# Patient Record
Sex: MALE | Race: WHITE | NOT HISPANIC OR LATINO
[De-identification: names, ages, dates, MRNs, and addresses within clinical notes are randomized per-mention and may not be internally consistent; named-entity substitution may affect disease eponyms.]

---

## 2017-07-27 ENCOUNTER — APPOINTMENT (OUTPATIENT)
Dept: FAMILY MEDICINE | Facility: CLINIC | Age: 72
End: 2017-07-27
Payer: MEDICARE

## 2017-07-27 VITALS
DIASTOLIC BLOOD PRESSURE: 78 MMHG | HEART RATE: 72 BPM | WEIGHT: 176 LBS | BODY MASS INDEX: 24.64 KG/M2 | SYSTOLIC BLOOD PRESSURE: 140 MMHG | TEMPERATURE: 98.7 F | OXYGEN SATURATION: 97 % | RESPIRATION RATE: 12 BRPM | HEIGHT: 71 IN

## 2017-07-27 DIAGNOSIS — L50.8 OTHER URTICARIA: ICD-10-CM

## 2017-07-27 PROCEDURE — 99214 OFFICE O/P EST MOD 30 MIN: CPT

## 2017-10-04 ENCOUNTER — APPOINTMENT (OUTPATIENT)
Dept: FAMILY MEDICINE | Facility: CLINIC | Age: 72
End: 2017-10-04
Payer: MEDICARE

## 2017-10-04 VITALS
OXYGEN SATURATION: 98 % | BODY MASS INDEX: 25.06 KG/M2 | DIASTOLIC BLOOD PRESSURE: 74 MMHG | SYSTOLIC BLOOD PRESSURE: 132 MMHG | HEIGHT: 71 IN | HEART RATE: 64 BPM | TEMPERATURE: 98.9 F | RESPIRATION RATE: 12 BRPM | WEIGHT: 179 LBS

## 2017-10-04 DIAGNOSIS — Z95.828 PRESENCE OF OTHER VASCULAR IMPLANTS AND GRAFTS: ICD-10-CM

## 2017-10-04 PROCEDURE — 99213 OFFICE O/P EST LOW 20 MIN: CPT | Mod: 25

## 2017-10-04 PROCEDURE — 90688 IIV4 VACCINE SPLT 0.5 ML IM: CPT

## 2017-10-04 PROCEDURE — G0008: CPT | Mod: 59

## 2018-09-13 ENCOUNTER — APPOINTMENT (OUTPATIENT)
Dept: FAMILY MEDICINE | Facility: CLINIC | Age: 73
End: 2018-09-13
Payer: MEDICARE

## 2018-09-13 VITALS
BODY MASS INDEX: 23.52 KG/M2 | SYSTOLIC BLOOD PRESSURE: 120 MMHG | DIASTOLIC BLOOD PRESSURE: 68 MMHG | OXYGEN SATURATION: 98 % | HEIGHT: 71 IN | WEIGHT: 168 LBS | TEMPERATURE: 98.7 F | RESPIRATION RATE: 12 BRPM | HEART RATE: 68 BPM

## 2018-09-13 DIAGNOSIS — Z23 ENCOUNTER FOR IMMUNIZATION: ICD-10-CM

## 2018-09-13 PROCEDURE — G0008: CPT

## 2018-09-13 PROCEDURE — 90662 IIV NO PRSV INCREASED AG IM: CPT

## 2019-08-27 ENCOUNTER — INBOUND DOCUMENT (OUTPATIENT)
Age: 74
End: 2019-08-27

## 2019-10-16 ENCOUNTER — INBOUND DOCUMENT (OUTPATIENT)
Age: 74
End: 2019-10-16

## 2019-12-16 ENCOUNTER — INBOUND DOCUMENT (OUTPATIENT)
Age: 74
End: 2019-12-16

## 2020-09-29 ENCOUNTER — APPOINTMENT (OUTPATIENT)
Dept: FAMILY MEDICINE | Facility: CLINIC | Age: 75
End: 2020-09-29
Payer: MEDICARE

## 2020-09-29 VITALS
BODY MASS INDEX: 23.8 KG/M2 | TEMPERATURE: 97.7 F | DIASTOLIC BLOOD PRESSURE: 70 MMHG | HEART RATE: 70 BPM | WEIGHT: 170 LBS | RESPIRATION RATE: 14 BRPM | HEIGHT: 71 IN | SYSTOLIC BLOOD PRESSURE: 120 MMHG | OXYGEN SATURATION: 96 %

## 2020-09-29 DIAGNOSIS — Z23 ENCOUNTER FOR IMMUNIZATION: ICD-10-CM

## 2020-09-29 PROCEDURE — 90686 IIV4 VACC NO PRSV 0.5 ML IM: CPT

## 2020-09-29 PROCEDURE — G0008: CPT

## 2020-10-06 PROBLEM — Z23 NEEDS FLU SHOT: Status: ACTIVE | Noted: 2020-10-06

## 2020-10-06 NOTE — ASSESSMENT
[FreeTextEntry1] : Pt present for Flu Vax only. \par High Dose Flu was given L deltiod \par pt handle Vax well. \par Understood Vax information given.

## 2021-12-14 ENCOUNTER — APPOINTMENT (OUTPATIENT)
Dept: FAMILY MEDICINE | Facility: CLINIC | Age: 76
End: 2021-12-14
Payer: MEDICARE

## 2021-12-14 VITALS
TEMPERATURE: 98.2 F | HEART RATE: 87 BPM | HEIGHT: 71 IN | BODY MASS INDEX: 24.08 KG/M2 | RESPIRATION RATE: 12 BRPM | DIASTOLIC BLOOD PRESSURE: 80 MMHG | WEIGHT: 172 LBS | SYSTOLIC BLOOD PRESSURE: 120 MMHG | OXYGEN SATURATION: 98 %

## 2021-12-14 PROCEDURE — G0008: CPT

## 2021-12-14 PROCEDURE — 0011A: CPT

## 2021-12-14 PROCEDURE — 90662 IIV NO PRSV INCREASED AG IM: CPT

## 2021-12-14 NOTE — HISTORY OF PRESENT ILLNESS
[FreeTextEntry1] : PT  HERE FOR FLU VACCINE AND REQ. COVID MODERNA 1ST DOSE. PT HAS NOT SEEN PMD FOR ABOUT 14MONTHS AS PER MICKY PAL TO ADM. VACCINES AND APPT FOR A CPE WILL BE GIVEN TODAY.  [de-identified] : PT REPORTS FEELING STABLE

## 2021-12-14 NOTE — PLAN
[FreeTextEntry1] : HIGH FLU AND 1ST DOSE MODERNA VACCINE ADMINISTERD ON BOTH ARMS, WELL TOLERATED, NO S/S ACUTE RXS. EDUCATIONAL PAMPHLET PROVIDED. PT VERB. UNDERSTANDING. MARGARITA ROLON

## 2022-01-12 ENCOUNTER — APPOINTMENT (OUTPATIENT)
Dept: FAMILY MEDICINE | Facility: CLINIC | Age: 77
End: 2022-01-12
Payer: MEDICARE

## 2022-01-12 VITALS
WEIGHT: 172 LBS | TEMPERATURE: 97.9 F | RESPIRATION RATE: 16 BRPM | SYSTOLIC BLOOD PRESSURE: 132 MMHG | HEIGHT: 71 IN | BODY MASS INDEX: 24.08 KG/M2 | DIASTOLIC BLOOD PRESSURE: 80 MMHG | HEART RATE: 88 BPM | OXYGEN SATURATION: 98 %

## 2022-01-12 PROCEDURE — 0012A: CPT

## 2022-01-12 NOTE — ADDENDUM
[FreeTextEntry1] : Booster dose adm. on left deltoid, well tolerated, pt inst. on possible s/e, no acute rxs at present. Educational pamphlet provided. Pt to wait 15 min. post vacc. administration. Pt alexandre. understanding. Yasmin ROLON

## 2022-01-26 ENCOUNTER — APPOINTMENT (OUTPATIENT)
Dept: FAMILY MEDICINE | Facility: CLINIC | Age: 77
End: 2022-01-26

## 2022-04-24 ENCOUNTER — TRANSCRIPTION ENCOUNTER (OUTPATIENT)
Age: 77
End: 2022-04-24

## 2022-04-25 ENCOUNTER — TRANSCRIPTION ENCOUNTER (OUTPATIENT)
Age: 77
End: 2022-04-25

## 2022-04-25 ENCOUNTER — LABORATORY RESULT (OUTPATIENT)
Age: 77
End: 2022-04-25

## 2022-04-25 ENCOUNTER — APPOINTMENT (OUTPATIENT)
Dept: FAMILY MEDICINE | Facility: CLINIC | Age: 77
End: 2022-04-25
Payer: MEDICARE

## 2022-04-25 VITALS
OXYGEN SATURATION: 94 % | HEIGHT: 71 IN | WEIGHT: 155 LBS | TEMPERATURE: 98 F | SYSTOLIC BLOOD PRESSURE: 118 MMHG | BODY MASS INDEX: 21.7 KG/M2 | HEART RATE: 66 BPM | RESPIRATION RATE: 16 BRPM | DIASTOLIC BLOOD PRESSURE: 70 MMHG

## 2022-04-25 DIAGNOSIS — M31.19 OTHER THROMBOTIC MICROANGIOPATHY: ICD-10-CM

## 2022-04-25 DIAGNOSIS — E78.5 HYPERLIPIDEMIA, UNSPECIFIED: ICD-10-CM

## 2022-04-25 DIAGNOSIS — Z12.11 ENCOUNTER FOR SCREENING FOR MALIGNANT NEOPLASM OF COLON: ICD-10-CM

## 2022-04-25 DIAGNOSIS — Z78.9 OTHER SPECIFIED HEALTH STATUS: ICD-10-CM

## 2022-04-25 DIAGNOSIS — Z11.52 ENCOUNTER FOR SCREENING FOR COVID-19: ICD-10-CM

## 2022-04-25 DIAGNOSIS — Z72.3 LACK OF PHYSICAL EXERCISE: ICD-10-CM

## 2022-04-25 DIAGNOSIS — I10 ESSENTIAL (PRIMARY) HYPERTENSION: ICD-10-CM

## 2022-04-25 DIAGNOSIS — I82.90 ACUTE EMBOLISM AND THROMBOSIS OF UNSPECIFIED VEIN: ICD-10-CM

## 2022-04-25 DIAGNOSIS — R79.89 OTHER SPECIFIED ABNORMAL FINDINGS OF BLOOD CHEMISTRY: ICD-10-CM

## 2022-04-25 DIAGNOSIS — Z12.5 ENCOUNTER FOR SCREENING FOR MALIGNANT NEOPLASM OF PROSTATE: ICD-10-CM

## 2022-04-25 DIAGNOSIS — R53.83 OTHER FATIGUE: ICD-10-CM

## 2022-04-25 DIAGNOSIS — J06.9 ACUTE UPPER RESPIRATORY INFECTION, UNSPECIFIED: ICD-10-CM

## 2022-04-25 LAB — SARS-COV-2 RDRP RESP QL NAA+PROBE: NEGATIVE

## 2022-04-25 PROCEDURE — 87635 SARS-COV-2 COVID-19 AMP PRB: CPT

## 2022-04-25 PROCEDURE — 36415 COLL VENOUS BLD VENIPUNCTURE: CPT

## 2022-04-25 PROCEDURE — 99214 OFFICE O/P EST MOD 30 MIN: CPT | Mod: CS,25

## 2022-04-25 PROCEDURE — 87804 INFLUENZA ASSAY W/OPTIC: CPT | Mod: QW

## 2022-04-25 RX ORDER — AZITHROMYCIN 250 MG/1
250 TABLET, FILM COATED ORAL
Qty: 1 | Refills: 2 | Status: ACTIVE | COMMUNITY
Start: 2022-04-25 | End: 1900-01-01

## 2022-04-25 RX ORDER — METHYLPREDNISOLONE 4 MG/1
4 TABLET ORAL
Qty: 1 | Refills: 0 | Status: COMPLETED | COMMUNITY
Start: 2017-07-27 | End: 2022-04-25

## 2022-04-25 RX ORDER — CEFPROZIL 500 MG/1
500 TABLET ORAL
Qty: 20 | Refills: 1 | Status: ACTIVE | COMMUNITY
Start: 2022-04-25 | End: 1900-01-01

## 2022-04-25 NOTE — HISTORY OF PRESENT ILLNESS
[Family Member] : family member [FreeTextEntry1] : C/o body aches, increased fatigue, SOB for the past three weeks. [de-identified] : C/o body aches, increased fatigue, SOB for the past three weeks.

## 2022-04-25 NOTE — HEALTH RISK ASSESSMENT
[Never] : Never [Yes] : Yes [Monthly or less (1 pt)] : Monthly or less (1 point) [1 or 2 (0 pts)] : 1 or 2 (0 points) [Never (0 pts)] : Never (0 points) [No] : In the past 12 months have you used drugs other than those required for medical reasons? No [No falls in past year] : Patient reported no falls in the past year [0] : 2) Feeling down, depressed, or hopeless: Not at all (0) [PHQ-2 Negative - No further assessment needed] : PHQ-2 Negative - No further assessment needed [Patient/Caregiver not ready to engage] : , patient/caregiver not ready to engage [I will adhere to the patient's wishes.] : I will adhere to the patient's wishes. [Time Spent: ___ minutes] : Time Spent: [unfilled] minutes [0-4] : 0-4 [Audit-CScore] : 0 [de-identified] : Occasional  [de-identified] : Average [[facility]] : 8 [JYS2Nhyel] : 0 [AdvancecareDate] : 04/22

## 2022-04-25 NOTE — PHYSICAL EXAM
[Well Nourished] : well nourished [Well Developed] : well developed [Normal Sclera/Conjunctiva] : normal sclera/conjunctiva [PERRL] : pupils equal round and reactive to light [EOMI] : extraocular movements intact [Normal Outer Ear/Nose] : the outer ears and nose were normal in appearance [Normal Oropharynx] : the oropharynx was normal [No JVD] : no jugular venous distention [No Lymphadenopathy] : no lymphadenopathy [Supple] : supple [Thyroid Normal, No Nodules] : the thyroid was normal and there were no nodules present [No Respiratory Distress] : no respiratory distress  [No Accessory Muscle Use] : no accessory muscle use [Normal Rate] : normal rate  [Regular Rhythm] : with a regular rhythm [Normal S1, S2] : normal S1 and S2 [No Murmur] : no murmur heard [No Carotid Bruits] : no carotid bruits [No Abdominal Bruit] : a ~M bruit was not heard ~T in the abdomen [No Varicosities] : no varicosities [Pedal Pulses Present] : the pedal pulses are present [No Edema] : there was no peripheral edema [No Palpable Aorta] : no palpable aorta [No Extremity Clubbing/Cyanosis] : no extremity clubbing/cyanosis [Soft] : abdomen soft [Non Tender] : non-tender [Non-distended] : non-distended [No Masses] : no abdominal mass palpated [No HSM] : no HSM [Normal Bowel Sounds] : normal bowel sounds [Normal Posterior Cervical Nodes] : no posterior cervical lymphadenopathy [Normal Anterior Cervical Nodes] : no anterior cervical lymphadenopathy [No CVA Tenderness] : no CVA  tenderness [No Spinal Tenderness] : no spinal tenderness [No Joint Swelling] : no joint swelling [Grossly Normal Strength/Tone] : grossly normal strength/tone [No Rash] : no rash [Coordination Grossly Intact] : coordination grossly intact [No Focal Deficits] : no focal deficits [Normal Gait] : normal gait [Deep Tendon Reflexes (DTR)] : deep tendon reflexes were 2+ and symmetric [Normal Affect] : the affect was normal [Normal Insight/Judgement] : insight and judgment were intact

## 2022-04-25 NOTE — COUNSELING
[Fall prevention counseling provided] : Fall prevention counseling provided [Adequate lighting] : Adequate lighting [No throw rugs] : No throw rugs [Use proper foot wear] : Use proper foot wear [Behavioral health counseling provided] : Behavioral health counseling provided [Sleep ___ hours/day] : Sleep [unfilled] hours/day [Engage in a relaxing activity] : Engage in a relaxing activity [AUDIT-C Screening administered and reviewed] : AUDIT-C Screening administered and reviewed [Potential consequences of obesity discussed] : Potential consequences of obesity discussed [Benefits of weight loss discussed] : Benefits of weight loss discussed [Encouraged to increase physical activity] : Encouraged to increase physical activity [Weigh Self Weekly] : weigh self weekly [Decrease Portions] : decrease portions [None] : None [Good understanding] : Patient has a good understanding of lifestyle changes and steps needed to achieve self management goal [No] : Risk of tobacco use and health benefits of smoking cessation discussed: No [FreeTextEntry2] : Never smoker

## 2022-04-25 NOTE — REVIEW OF SYSTEMS
[Patient Intake Form Reviewed] : Patient intake form was reviewed [Negative] : Heme/Lymph [Chills] : chills [Fatigue] : fatigue [Recent Change In Weight] : ~T recent weight change [Nasal Discharge] : nasal discharge [Shortness Of Breath] : shortness of breath [Cough] : cough [Dyspnea on Exertion] : dyspnea on exertion [Abdominal Pain] : abdominal pain [Nausea] : nausea [Nocturia] : nocturia [Frequency] : frequency [Muscle Pain] : muscle pain [Unsteady Walk] : ataxia [Fever] : no fever [Hot Flashes] : no hot flashes [Night Sweats] : no night sweats [Sore Throat] : no sore throat [Chest Pain] : no chest pain [Palpitations] : no palpitations [Orthopena] : no orthopnea [Paroxysmal Nocturnal Dyspnea] : no paroxysmal nocturnal dyspnea [Wheezing] : no wheezing [Constipation] : no constipation [Diarrhea] : no diarrhea [Vomiting] : no vomiting [Heartburn] : no heartburn [Melena] : no melena [Dysuria] : no dysuria [Hematuria] : no hematuria [Joint Pain] : no joint pain [Joint Stiffness] : no joint stiffness [Joint Swelling] : no joint swelling [Headache] : no headache [Fainting] : no fainting [FreeTextEntry4] : Cough

## 2022-04-25 NOTE — ASSESSMENT
[FreeTextEntry1] : Pneumonia - Chest X-ray ordered, Azithromycin 250 mg PO, Cefprozil 500 mg PO BID x10 days. Neg. Rapid Flue and COVID.\par Fatigue - Labs for Fatigue and Dysmetabolism.\par Low Vit. D - Vit. d Level.\par COVID 19 Screening - COVID 19 Abs.\par PSA Screening - PSA.\par

## 2022-04-26 LAB
25(OH)D3 SERPL-MCNC: 9.9 NG/ML
ALBUMIN SERPL ELPH-MCNC: 3.8 G/DL
ALP BLD-CCNC: 89 U/L
ALT SERPL-CCNC: 89 U/L
ANION GAP SERPL CALC-SCNC: 17 MMOL/L
APPEARANCE: CLEAR
AST SERPL-CCNC: 59 U/L
BASOPHILS # BLD AUTO: 0.04 K/UL
BASOPHILS NFR BLD AUTO: 0.3 %
BILIRUB SERPL-MCNC: 0.5 MG/DL
BILIRUBIN URINE: NEGATIVE
BLOOD URINE: ABNORMAL
BUN SERPL-MCNC: 18 MG/DL
C PEPTIDE SERPL-MCNC: 4.4 NG/ML
CALCIUM SERPL-MCNC: 8.7 MG/DL
CHLORIDE SERPL-SCNC: 101 MMOL/L
CHOLEST SERPL-MCNC: 117 MG/DL
CO2 SERPL-SCNC: 24 MMOL/L
COLOR: YELLOW
COVID-19 NUCLEOCAPSID  GAM ANTIBODY INTERPRETATION: NEGATIVE
COVID-19 SPIKE DOMAIN ANTIBODY INTERPRETATION: POSITIVE
CREAT SERPL-MCNC: 0.85 MG/DL
CREAT SPEC-SCNC: 96 MG/DL
EGFR: 89 ML/MIN/1.73M2
EOSINOPHIL # BLD AUTO: 0 K/UL
EOSINOPHIL NFR BLD AUTO: 0 %
ESTIMATED AVERAGE GLUCOSE: 117 MG/DL
FERRITIN SERPL-MCNC: 664 NG/ML
FOLATE SERPL-MCNC: 13.3 NG/ML
GLUCOSE QUALITATIVE U: NEGATIVE
GLUCOSE SERPL-MCNC: 111 MG/DL
HBA1C MFR BLD HPLC: 5.7 %
HCT VFR BLD CALC: 51.4 %
HDLC SERPL-MCNC: 28 MG/DL
HGB BLD-MCNC: 17.1 G/DL
IMM GRANULOCYTES NFR BLD AUTO: 0.6 %
IRON SATN MFR SERPL: 9 %
IRON SERPL-MCNC: 17 UG/DL
KETONES URINE: NORMAL
LDLC SERPL CALC-MCNC: 70 MG/DL
LEUKOCYTE ESTERASE URINE: NEGATIVE
LYMPHOCYTES # BLD AUTO: 0.63 K/UL
LYMPHOCYTES NFR BLD AUTO: 4.4 %
MAN DIFF?: NORMAL
MCHC RBC-ENTMCNC: 33.3 GM/DL
MCHC RBC-ENTMCNC: 33.4 PG
MCV RBC AUTO: 100.4 FL
MICROALBUMIN 24H UR DL<=1MG/L-MCNC: 27.9 MG/DL
MICROALBUMIN/CREAT 24H UR-RTO: 290 MG/G
MONOCYTES # BLD AUTO: 0.58 K/UL
MONOCYTES NFR BLD AUTO: 4.1 %
NEUTROPHILS # BLD AUTO: 12.98 K/UL
NEUTROPHILS NFR BLD AUTO: 90.6 %
NITRITE URINE: NEGATIVE
NONHDLC SERPL-MCNC: 90 MG/DL
PH URINE: 6
PLATELET # BLD AUTO: 168 K/UL
POTASSIUM SERPL-SCNC: 2.9 MMOL/L
PROT SERPL-MCNC: 6.4 G/DL
PROTEIN URINE: ABNORMAL
PSA SERPL-MCNC: 3.06 NG/ML
RBC # BLD: 5.12 M/UL
RBC # FLD: 14.7 %
SARS-COV-2 AB SERPL IA-ACNC: >250 U/ML
SARS-COV-2 AB SERPL QL IA: 0.06 INDEX
SODIUM SERPL-SCNC: 141 MMOL/L
SPECIFIC GRAVITY URINE: 1.02
T4 SERPL-MCNC: 10 UG/DL
TIBC SERPL-MCNC: 184 UG/DL
TRIGL SERPL-MCNC: 100 MG/DL
TSH SERPL-ACNC: 0.84 UIU/ML
UIBC SERPL-MCNC: 167 UG/DL
UROBILINOGEN URINE: NORMAL
VIT B12 SERPL-MCNC: 514 PG/ML
WBC # FLD AUTO: 14.32 K/UL

## 2022-04-26 RX ORDER — ERGOCALCIFEROL 1.25 MG/1
1.25 MG CAPSULE, LIQUID FILLED ORAL
Qty: 12 | Refills: 1 | Status: ACTIVE | COMMUNITY
Start: 2022-04-26 | End: 1900-01-01

## 2022-04-27 ENCOUNTER — NON-APPOINTMENT (OUTPATIENT)
Age: 77
End: 2022-04-27

## 2022-04-27 ENCOUNTER — INPATIENT (INPATIENT)
Facility: HOSPITAL | Age: 77
LOS: 20 days | Discharge: HOME | DRG: 177 | End: 2022-05-18
Attending: INTERNAL MEDICINE | Admitting: INTERNAL MEDICINE
Payer: MEDICARE

## 2022-04-27 VITALS
WEIGHT: 154.98 LBS | OXYGEN SATURATION: 87 % | HEIGHT: 70 IN | RESPIRATION RATE: 20 BRPM | SYSTOLIC BLOOD PRESSURE: 105 MMHG | HEART RATE: 63 BPM | TEMPERATURE: 99 F | DIASTOLIC BLOOD PRESSURE: 66 MMHG

## 2022-04-27 DIAGNOSIS — J10.1 INFLUENZA DUE TO OTHER IDENTIFIED INFLUENZA VIRUS WITH OTHER RESPIRATORY MANIFESTATIONS: ICD-10-CM

## 2022-04-27 DIAGNOSIS — K46.9 UNSPECIFIED ABDOMINAL HERNIA WITHOUT OBSTRUCTION OR GANGRENE: Chronic | ICD-10-CM

## 2022-04-27 DIAGNOSIS — Z96.641 PRESENCE OF RIGHT ARTIFICIAL HIP JOINT: Chronic | ICD-10-CM

## 2022-04-27 DIAGNOSIS — Z95.810 PRESENCE OF AUTOMATIC (IMPLANTABLE) CARDIAC DEFIBRILLATOR: Chronic | ICD-10-CM

## 2022-04-27 DIAGNOSIS — Z90.49 ACQUIRED ABSENCE OF OTHER SPECIFIED PARTS OF DIGESTIVE TRACT: Chronic | ICD-10-CM

## 2022-04-27 LAB
ALBUMIN SERPL ELPH-MCNC: 3 G/DL — LOW (ref 3.3–5.2)
ALP SERPL-CCNC: 83 U/L — SIGNIFICANT CHANGE UP (ref 40–120)
ALT FLD-CCNC: 56 U/L — HIGH
ANION GAP SERPL CALC-SCNC: 14 MMOL/L — SIGNIFICANT CHANGE UP (ref 5–17)
AST SERPL-CCNC: 51 U/L — HIGH
B PERT DNA SPEC QL NAA+PROBE: SIGNIFICANT CHANGE UP
BASOPHILS # BLD AUTO: 0.02 K/UL — SIGNIFICANT CHANGE UP (ref 0–0.2)
BASOPHILS NFR BLD AUTO: 0.1 % — SIGNIFICANT CHANGE UP (ref 0–2)
BILIRUB SERPL-MCNC: 0.6 MG/DL — SIGNIFICANT CHANGE UP (ref 0.4–2)
BUN SERPL-MCNC: 25.9 MG/DL — HIGH (ref 8–20)
C PNEUM DNA SPEC QL NAA+PROBE: SIGNIFICANT CHANGE UP
CALCIUM SERPL-MCNC: 8.8 MG/DL — SIGNIFICANT CHANGE UP (ref 8.6–10.2)
CHLORIDE SERPL-SCNC: 103 MMOL/L — SIGNIFICANT CHANGE UP (ref 98–107)
CO2 SERPL-SCNC: 24 MMOL/L — SIGNIFICANT CHANGE UP (ref 22–29)
CREAT SERPL-MCNC: 0.85 MG/DL — SIGNIFICANT CHANGE UP (ref 0.5–1.3)
EGFR: 89 ML/MIN/1.73M2 — SIGNIFICANT CHANGE UP
EOSINOPHIL # BLD AUTO: 0 K/UL — SIGNIFICANT CHANGE UP (ref 0–0.5)
EOSINOPHIL NFR BLD AUTO: 0 % — SIGNIFICANT CHANGE UP (ref 0–6)
FLUAV H1 2009 PAND RNA SPEC QL NAA+PROBE: DETECTED
FLUAV H1 RNA SPEC QL NAA+PROBE: SIGNIFICANT CHANGE UP
FLUAV H3 RNA SPEC QL NAA+PROBE: SIGNIFICANT CHANGE UP
FLUAV SUBTYP SPEC NAA+PROBE: DETECTED
FLUBV RNA SPEC QL NAA+PROBE: SIGNIFICANT CHANGE UP
GLUCOSE SERPL-MCNC: 116 MG/DL — HIGH (ref 70–99)
HADV DNA SPEC QL NAA+PROBE: SIGNIFICANT CHANGE UP
HCOV PNL SPEC NAA+PROBE: SIGNIFICANT CHANGE UP
HCT VFR BLD CALC: 45.8 % — SIGNIFICANT CHANGE UP (ref 39–50)
HGB BLD-MCNC: 15.8 G/DL — SIGNIFICANT CHANGE UP (ref 13–17)
HMPV RNA SPEC QL NAA+PROBE: SIGNIFICANT CHANGE UP
HPIV1 RNA SPEC QL NAA+PROBE: SIGNIFICANT CHANGE UP
HPIV2 RNA SPEC QL NAA+PROBE: SIGNIFICANT CHANGE UP
HPIV3 RNA SPEC QL NAA+PROBE: SIGNIFICANT CHANGE UP
HPIV4 RNA SPEC QL NAA+PROBE: SIGNIFICANT CHANGE UP
IMM GRANULOCYTES NFR BLD AUTO: 0.9 % — SIGNIFICANT CHANGE UP (ref 0–1.5)
LACTATE BLDV-MCNC: 2 MMOL/L — SIGNIFICANT CHANGE UP (ref 0.5–2)
LYMPHOCYTES # BLD AUTO: 0.88 K/UL — LOW (ref 1–3.3)
LYMPHOCYTES # BLD AUTO: 5.8 % — LOW (ref 13–44)
MAGNESIUM SERPL-MCNC: 2.2 MG/DL — SIGNIFICANT CHANGE UP (ref 1.6–2.6)
MCHC RBC-ENTMCNC: 33 PG — SIGNIFICANT CHANGE UP (ref 27–34)
MCHC RBC-ENTMCNC: 34.5 GM/DL — SIGNIFICANT CHANGE UP (ref 32–36)
MCV RBC AUTO: 95.6 FL — SIGNIFICANT CHANGE UP (ref 80–100)
MONOCYTES # BLD AUTO: 0.73 K/UL — SIGNIFICANT CHANGE UP (ref 0–0.9)
MONOCYTES NFR BLD AUTO: 4.8 % — SIGNIFICANT CHANGE UP (ref 2–14)
NEUTROPHILS # BLD AUTO: 13.53 K/UL — HIGH (ref 1.8–7.4)
NEUTROPHILS NFR BLD AUTO: 88.4 % — HIGH (ref 43–77)
NT-PROBNP SERPL-SCNC: 1263 PG/ML — HIGH (ref 0–300)
PLATELET # BLD AUTO: 173 K/UL — SIGNIFICANT CHANGE UP (ref 150–400)
POTASSIUM SERPL-MCNC: 3 MMOL/L — LOW (ref 3.5–5.3)
POTASSIUM SERPL-SCNC: 3 MMOL/L — LOW (ref 3.5–5.3)
PROT SERPL-MCNC: 6.3 G/DL — LOW (ref 6.6–8.7)
RAPID RVP RESULT: DETECTED
RBC # BLD: 4.79 M/UL — SIGNIFICANT CHANGE UP (ref 4.2–5.8)
RBC # FLD: 14.3 % — SIGNIFICANT CHANGE UP (ref 10.3–14.5)
RV+EV RNA SPEC QL NAA+PROBE: SIGNIFICANT CHANGE UP
SARS-COV-2 RNA SPEC QL NAA+PROBE: SIGNIFICANT CHANGE UP
SODIUM SERPL-SCNC: 141 MMOL/L — SIGNIFICANT CHANGE UP (ref 135–145)
TROPONIN T SERPL-MCNC: <0.01 NG/ML — SIGNIFICANT CHANGE UP (ref 0–0.06)
WBC # BLD: 15.29 K/UL — HIGH (ref 3.8–10.5)
WBC # FLD AUTO: 15.29 K/UL — HIGH (ref 3.8–10.5)

## 2022-04-27 PROCEDURE — 99223 1ST HOSP IP/OBS HIGH 75: CPT

## 2022-04-27 PROCEDURE — 93010 ELECTROCARDIOGRAM REPORT: CPT | Mod: 77

## 2022-04-27 PROCEDURE — 99285 EMERGENCY DEPT VISIT HI MDM: CPT | Mod: CS

## 2022-04-27 PROCEDURE — G1004: CPT

## 2022-04-27 PROCEDURE — 71275 CT ANGIOGRAPHY CHEST: CPT | Mod: 26,ME

## 2022-04-27 PROCEDURE — 71045 X-RAY EXAM CHEST 1 VIEW: CPT | Mod: 26

## 2022-04-27 RX ORDER — VANCOMYCIN HCL 1 G
1000 VIAL (EA) INTRAVENOUS ONCE
Refills: 0 | Status: COMPLETED | OUTPATIENT
Start: 2022-04-27 | End: 2022-04-27

## 2022-04-27 RX ORDER — ACETAMINOPHEN 500 MG
650 TABLET ORAL EVERY 6 HOURS
Refills: 0 | Status: DISCONTINUED | OUTPATIENT
Start: 2022-04-27 | End: 2022-05-18

## 2022-04-27 RX ORDER — SACUBITRIL AND VALSARTAN 24; 26 MG/1; MG/1
1 TABLET, FILM COATED ORAL
Qty: 0 | Refills: 0 | DISCHARGE

## 2022-04-27 RX ORDER — CARVEDILOL PHOSPHATE 80 MG/1
1 CAPSULE, EXTENDED RELEASE ORAL
Qty: 0 | Refills: 0 | DISCHARGE

## 2022-04-27 RX ORDER — SODIUM CHLORIDE 9 MG/ML
500 INJECTION INTRAMUSCULAR; INTRAVENOUS; SUBCUTANEOUS ONCE
Refills: 0 | Status: DISCONTINUED | OUTPATIENT
Start: 2022-04-27 | End: 2022-04-27

## 2022-04-27 RX ORDER — AMIODARONE HYDROCHLORIDE 400 MG/1
200 TABLET ORAL DAILY
Refills: 0 | Status: DISCONTINUED | OUTPATIENT
Start: 2022-04-27 | End: 2022-05-18

## 2022-04-27 RX ORDER — AMIODARONE HYDROCHLORIDE 400 MG/1
1 TABLET ORAL
Qty: 0 | Refills: 0 | DISCHARGE

## 2022-04-27 RX ORDER — IPRATROPIUM/ALBUTEROL SULFATE 18-103MCG
3 AEROSOL WITH ADAPTER (GRAM) INHALATION ONCE
Refills: 0 | Status: COMPLETED | OUTPATIENT
Start: 2022-04-27 | End: 2022-04-27

## 2022-04-27 RX ORDER — AMLODIPINE BESYLATE 2.5 MG/1
5 TABLET ORAL DAILY
Refills: 0 | Status: DISCONTINUED | OUTPATIENT
Start: 2022-04-27 | End: 2022-05-18

## 2022-04-27 RX ORDER — SACUBITRIL AND VALSARTAN 24; 26 MG/1; MG/1
1 TABLET, FILM COATED ORAL
Refills: 0 | Status: DISCONTINUED | OUTPATIENT
Start: 2022-04-27 | End: 2022-04-29

## 2022-04-27 RX ORDER — LANOLIN ALCOHOL/MO/W.PET/CERES
3 CREAM (GRAM) TOPICAL AT BEDTIME
Refills: 0 | Status: DISCONTINUED | OUTPATIENT
Start: 2022-04-27 | End: 2022-05-02

## 2022-04-27 RX ORDER — POTASSIUM CHLORIDE 20 MEQ
10 PACKET (EA) ORAL
Refills: 0 | Status: COMPLETED | OUTPATIENT
Start: 2022-04-27 | End: 2022-04-27

## 2022-04-27 RX ORDER — ASPIRIN/CALCIUM CARB/MAGNESIUM 324 MG
1 TABLET ORAL
Qty: 0 | Refills: 0 | DISCHARGE

## 2022-04-27 RX ORDER — FUROSEMIDE 40 MG
20 TABLET ORAL DAILY
Refills: 0 | Status: DISCONTINUED | OUTPATIENT
Start: 2022-04-27 | End: 2022-04-29

## 2022-04-27 RX ORDER — FUROSEMIDE 40 MG
40 TABLET ORAL ONCE
Refills: 0 | Status: COMPLETED | OUTPATIENT
Start: 2022-04-27 | End: 2022-04-27

## 2022-04-27 RX ORDER — ONDANSETRON 8 MG/1
4 TABLET, FILM COATED ORAL EVERY 8 HOURS
Refills: 0 | Status: DISCONTINUED | OUTPATIENT
Start: 2022-04-27 | End: 2022-05-18

## 2022-04-27 RX ORDER — ENOXAPARIN SODIUM 100 MG/ML
40 INJECTION SUBCUTANEOUS EVERY 24 HOURS
Refills: 0 | Status: DISCONTINUED | OUTPATIENT
Start: 2022-04-27 | End: 2022-05-18

## 2022-04-27 RX ORDER — IPRATROPIUM/ALBUTEROL SULFATE 18-103MCG
3 AEROSOL WITH ADAPTER (GRAM) INHALATION EVERY 6 HOURS
Refills: 0 | Status: DISCONTINUED | OUTPATIENT
Start: 2022-04-27 | End: 2022-05-10

## 2022-04-27 RX ORDER — AMLODIPINE BESYLATE 2.5 MG/1
1 TABLET ORAL
Qty: 0 | Refills: 0 | DISCHARGE

## 2022-04-27 RX ORDER — MAGNESIUM SULFATE 500 MG/ML
2 VIAL (ML) INJECTION ONCE
Refills: 0 | Status: COMPLETED | OUTPATIENT
Start: 2022-04-27 | End: 2022-04-27

## 2022-04-27 RX ORDER — FUROSEMIDE 40 MG
1 TABLET ORAL
Qty: 0 | Refills: 0 | DISCHARGE

## 2022-04-27 RX ORDER — CARVEDILOL PHOSPHATE 80 MG/1
6.25 CAPSULE, EXTENDED RELEASE ORAL EVERY 12 HOURS
Refills: 0 | Status: DISCONTINUED | OUTPATIENT
Start: 2022-04-27 | End: 2022-05-18

## 2022-04-27 RX ORDER — ATORVASTATIN CALCIUM 80 MG/1
1 TABLET, FILM COATED ORAL
Qty: 0 | Refills: 0 | DISCHARGE

## 2022-04-27 RX ORDER — ASPIRIN/CALCIUM CARB/MAGNESIUM 324 MG
81 TABLET ORAL DAILY
Refills: 0 | Status: DISCONTINUED | OUTPATIENT
Start: 2022-04-27 | End: 2022-05-18

## 2022-04-27 RX ORDER — PIPERACILLIN AND TAZOBACTAM 4; .5 G/20ML; G/20ML
3.38 INJECTION, POWDER, LYOPHILIZED, FOR SOLUTION INTRAVENOUS ONCE
Refills: 0 | Status: COMPLETED | OUTPATIENT
Start: 2022-04-27 | End: 2022-04-27

## 2022-04-27 RX ORDER — CLOPIDOGREL BISULFATE 75 MG/1
1 TABLET, FILM COATED ORAL
Qty: 0 | Refills: 0 | DISCHARGE

## 2022-04-27 RX ORDER — CLOPIDOGREL BISULFATE 75 MG/1
75 TABLET, FILM COATED ORAL DAILY
Refills: 0 | Status: DISCONTINUED | OUTPATIENT
Start: 2022-04-27 | End: 2022-05-18

## 2022-04-27 RX ORDER — ATORVASTATIN CALCIUM 80 MG/1
40 TABLET, FILM COATED ORAL AT BEDTIME
Refills: 0 | Status: DISCONTINUED | OUTPATIENT
Start: 2022-04-27 | End: 2022-05-18

## 2022-04-27 RX ADMIN — Medication 40 MILLIGRAM(S): at 23:36

## 2022-04-27 RX ADMIN — Medication 100 MILLIEQUIVALENT(S): at 17:20

## 2022-04-27 RX ADMIN — Medication 40 MILLIGRAM(S): at 13:03

## 2022-04-27 RX ADMIN — Medication 3 MILLILITER(S): at 16:22

## 2022-04-27 RX ADMIN — Medication 100 MILLIEQUIVALENT(S): at 18:47

## 2022-04-27 RX ADMIN — Medication 250 MILLIGRAM(S): at 20:33

## 2022-04-27 RX ADMIN — Medication 150 GRAM(S): at 13:40

## 2022-04-27 RX ADMIN — Medication 75 MILLIGRAM(S): at 20:33

## 2022-04-27 RX ADMIN — Medication 100 MILLIEQUIVALENT(S): at 20:06

## 2022-04-27 RX ADMIN — PIPERACILLIN AND TAZOBACTAM 200 GRAM(S): 4; .5 INJECTION, POWDER, LYOPHILIZED, FOR SOLUTION INTRAVENOUS at 19:15

## 2022-04-27 NOTE — H&P ADULT - NSHPPHYSICALEXAM_GEN_ALL_CORE
Vital Signs Last 24 Hrs  T(C): 37.4 (27 Apr 2022 11:25), Max: 37.4 (27 Apr 2022 11:25)  T(F): 99.3 (27 Apr 2022 11:25), Max: 99.3 (27 Apr 2022 11:25)  HR: 66 (27 Apr 2022 22:30) (55 - 66)  BP: 110/65 (27 Apr 2022 22:30) (85/51 - 122/83)  BP(mean): 89 (27 Apr 2022 15:23) (89 - 89)  RR: 25 (27 Apr 2022 22:30) (20 - 25)  SpO2: 92% (27 Apr 2022 22:30) (87% - 97%)    Constitutional: Well-appearing, NAD, VSS  Head: NC/AT +HFNC  Eyes: PERRL, EOMI, anicteric sclera, conjunctiva WNL  ENT: Normal Pharynx, MMM, No tonsillar exudate/erythema  Neck: Supple, Non-tender  Chest: Non-tender, no rashes  Cardio: RRR, s1/s2, no appreciable murmurs/rubs/gallops  Resp: +Coarse BS BL with Bibasilar Crackles   Abd: Soft, Non-tender, Non-distended, no rebound/guarding/rigidity  : not examined  Rectal: not examined  MSK: moving all extremities, no motor weakness, full ROM x4  Ext: palpable distal pulses, good capillary refill  Psych: appropriate, cooperative  Neuro: CN II-XII grossly intact, no focal deficits  Skin: Warm/Dry. No rashes.

## 2022-04-27 NOTE — ED ADULT NURSE REASSESSMENT NOTE - NS ED NURSE REASSESS COMMENT FT1
Pt is on bipap. ED respiratory therapist states that she cant do the neb treatment due to pt6s covid test not being resulted.  notified.

## 2022-04-27 NOTE — H&P ADULT - HISTORY OF PRESENT ILLNESS
76M with PMHX MI/CAD, HTN, HLD, CHFrEF, ICM s/p AICD presents to [facility] ER c/o SOB/CARL and Nonproductive Cough x1-2 days s/p failed outpt abx for PNA admitted for Acute Hypoxic Respiratory Failure. Patient significantly hypoxic on arrival satting mid 80s on RA unimproved significantly on ventimask/NRB intermittently placed on BIPAP then placed on HFNC 45L 70% FIO2 satting SPO2 90%. Pt  seen/examined. Feels much beter on HFNC. CXR +BL infiltrates. CTA Chest negative for PE but +BL GGO. COVID Negativ. RVP positive Influenza AH3. Patient unimproved on outpt or inpt abx. ABX dc'd. Patient updated on plan. Also discussed with emergency contact patient's son Ruddy Zimmer Jr via Telephone and daughter. ROS negative unless mentioned.     Meds in ED: Vanco, Zosyn, 500cc IVFB, Duoneb x3, KCL IV 10meq x3, Mag Sulfate 2g IV, Lasix 40mg IV

## 2022-04-27 NOTE — ED PROVIDER NOTE - PROGRESS NOTE DETAILS
patient placed on BIPAP, CTA showed b/l infiltrate, Flu A +. patient got duonebs x 3 and magnesium. Patient now off BIPAP, on venti mask. patient 93% on high flow I spoke with  for admission, recommend to give a dose of abx.

## 2022-04-27 NOTE — ED ADULT TRIAGE NOTE - CHIEF COMPLAINT QUOTE
Pt brought in by daughter who states " He was diagnosed two days ago with PN and he is not getting better "

## 2022-04-27 NOTE — ED PROVIDER NOTE - NS ED ROS FT
Review of Systems  •	CONSTITUTIONAL - no  fever, no diaphoresis, no weight change  •	SKIN - no rash  •	HEMATOLOGIC - no bleeding, no bruising  •	EYES - no eye pain, no blurred vision  •	ENT - no change in hearing, no pain  •	RESPIRATORY - (+) shortness of breath, (+)  cough  •	CARDIAC - no chest pain, no palpitations  •	GI - no abd pain, no nausea, no vomiting, no diarrhea, no constipation, no bleeding  •	GENITO-URINARY - no discharge, no dysuria; no hematuria,   •	ENDO - no polydipsia, no polyuria, no heat/no cold intolerance  •	MUSCULOSKELETAL - no joint pain, no swelling, no redness  •	NEUROLOGIC - no weakness, no headache, no anesthesia, no paresthesias  •	PSYCH - no anxiety, non suicidal, non homicidal, no hallucination, no depression

## 2022-04-27 NOTE — ED PROVIDER NOTE - OBJECTIVE STATEMENT
77 yo M hx of MI, AICD, HTN, TTP, arrythmia? on amiodarone, CHF? on lasix, p/w sob. patient report weakness and body ache x 3 weeks. He saw his PMD and diagnosed with pneumonia started on Z-pack and cefdoxil.      cards:  77 yo M hx of MI, AICD, HTN, TTP, arrythmia? on amiodarone, CHF? on lasix, p/w sob. patient report weakness and body ache x 3 weeks. He had peristent cough but no sputum. He saw his PMD and diagnosed with pneumonia started on Z-pack and cefprozil.  no recent travel.      cards:

## 2022-04-27 NOTE — ED ADULT NURSE REASSESSMENT NOTE - NS ED NURSE REASSESS COMMENT FT1
assumed care of patient from Meliza RN. Pt is alert and oriented. Pt resting on 15 L  venti mask and sating 87%.  notified and respiratory contacted. Pt states that he is slightly sob and was "hallucinating" this morning. Pt states that he thought the water bottle cap was melting. Pt denies chest pain, nausea, vomiting, dizziness and pain. Pt resp are even and unlabored, skin color charlie for race. Pt updated on plan of care. pt educated on plan of care, pt able to successfully teach back plan of care to RN, RN will continue to reeducate pt during hospital stay.

## 2022-04-27 NOTE — H&P ADULT - NSICDXPASTSURGICALHX_GEN_ALL_CORE_FT
PAST SURGICAL HISTORY:  Abdominal hernia     AICD (automatic cardioverter/defibrillator) present     History of total hip replacement, right     S/P cholecystectomy

## 2022-04-27 NOTE — ED PROVIDER NOTE - CLINICAL SUMMARY MEDICAL DECISION MAKING FREE TEXT BOX
77 yo M p/w cough and sob. He was found hypoxic 86% on RA. on abx for pneumonia. occasinal crackle on lung, lasix 40 mg IV given. will get blood work, ekg, cxr, rvp. will need admission for hypoxia.

## 2022-04-27 NOTE — ED PROVIDER NOTE - CARE PLAN
Principal Discharge DX:	Influenza A  Secondary Diagnosis:	Acute respiratory failure with hypoxia   1

## 2022-04-27 NOTE — H&P ADULT - ASSESSMENT
ASSESSMENT:  76M with PMHX MI/CAD, HTN, HLD, CHFrEF, ICM s/p AICD presents to [facility] ER c/o SOB/CARL and Nonproductive Cough x1-2 days s/p failed outpt abx for PNA admitted for Acute Hypoxic Respiratory Failure 2/2 Viral PNA/Influenza A r/o ARDS.    PLAN:  Acute Hypoxic Respiratory Failure 2/2 Influenza PNA r/o ARDS  -admit to tele/  -COVID19 Negative  -RVP Positive Influenza AH3  -CXR +BL Infiltrates  -CTA Chest +BL GGO but no obvious dense infiltrate/consolidation concerning for bacterial PNA  -Repeat Labs  -Trend Leukocytosis  -Check CRP and Procal  -Discontinue Outpt and Inpt ABX  -Symptom Onset 48 hours ago  -Start Tamiflu 75mg PO BID first dose now  -Concern for developing ARDS with BL infiltrates and significant O2 requirement  -Start 1mg/kg pulse dose steroid x3 days = Solumedrol 40mg q12 x3d   -Duoneb q6 ATC  -Droplet Isolation  -VTE PPX LMWH  -Pulm Consult     Leukocytosis  -DC Abx. Plan as above. Trend CBC.    Hypokalemia  -K 3.0 -> KCL 10meq IV x3 +Mag Sulfate 2g IV  -Restart home ARB/ARNI  -Repeat BMP and EKG in AM    MI/CAD, HTN, HLD, CHFrEF, ICM s/p AICD  -ASA 81mg q24 + Plavix 75mg PO q24 for DAPT  -Amiodarone 200mg q24 for VT  -Coreg 6.25mg PO BID   -Amlodipine 5mg PO q24  -Entresto 49/51mg PO BID  -Atorvastatin 40mg PO q24  -Lasix 20mg PO q24  -Lasix 40mg IV x1 in ED

## 2022-04-27 NOTE — ED PROVIDER NOTE - PHYSICAL EXAMINATION
VITAL SIGNS: I have reviewed nursing notes and confirm.  CONSTITUTIONAL:  in no acute distress.  SKIN: Skin exam is warm and dry, no acute rash.  HEAD: Normocephalic; atraumatic.  EYES: PERRL, EOM intact; conjunctiva and sclera clear.  ENT: No nasal discharge; airway clear. Throat clear.  NECK: Supple; non tender.    CARD: Regular rate and rhythm.  RESP: No wheezes,  (+) occasional crackles   ABD:  soft; non-distended; non-tender;   EXT: Normal ROM. No clubbing, cyanosis or edema.  NEURO: Alert, oriented. Grossly unremarkable. No focal deficits.  moves all extremities,    PSYCH: Cooperative, appropriate.

## 2022-04-27 NOTE — H&P ADULT - NSHPLABSRESULTS_GEN_ALL_CORE
EKG Sinus Bradycardia 59bpm Left Axis Deviation Non-specific intra-ventricular conduction delay Nonspecific TWA Abnormal ECG     CXR IMPRESSION: Bilateral lung parenchymal airspace opacities, right greater   than left, suggesting bilateral pneumonia. Clinical correlation and   follow-up suggested.     CTA Chest IVC IMPRESSION:  No pulmonary embolus.  Patchy bilateral groundglass opacities which have the appearance of COVID   19 pneumonia. Correlate with PCR examination.  Remaining incidental findings as above.

## 2022-04-28 LAB
ALBUMIN SERPL ELPH-MCNC: 2.7 G/DL — LOW (ref 3.3–5.2)
ALP SERPL-CCNC: 80 U/L — SIGNIFICANT CHANGE UP (ref 40–120)
ALT FLD-CCNC: 48 U/L — HIGH
ANION GAP SERPL CALC-SCNC: 12 MMOL/L — SIGNIFICANT CHANGE UP (ref 5–17)
ANION GAP SERPL CALC-SCNC: 15 MMOL/L — SIGNIFICANT CHANGE UP (ref 5–17)
AST SERPL-CCNC: 44 U/L — HIGH
BASOPHILS # BLD AUTO: 0.02 K/UL — SIGNIFICANT CHANGE UP (ref 0–0.2)
BASOPHILS NFR BLD AUTO: 0.1 % — SIGNIFICANT CHANGE UP (ref 0–2)
BILIRUB SERPL-MCNC: 0.8 MG/DL — SIGNIFICANT CHANGE UP (ref 0.4–2)
BUN SERPL-MCNC: 18.7 MG/DL — SIGNIFICANT CHANGE UP (ref 8–20)
BUN SERPL-MCNC: 22.4 MG/DL — HIGH (ref 8–20)
CALCIUM SERPL-MCNC: 8.3 MG/DL — LOW (ref 8.6–10.2)
CALCIUM SERPL-MCNC: 8.8 MG/DL — SIGNIFICANT CHANGE UP (ref 8.6–10.2)
CHLORIDE SERPL-SCNC: 102 MMOL/L — SIGNIFICANT CHANGE UP (ref 98–107)
CHLORIDE SERPL-SCNC: 106 MMOL/L — SIGNIFICANT CHANGE UP (ref 98–107)
CK MB CFR SERPL CALC: 1.8 NG/ML — SIGNIFICANT CHANGE UP (ref 0–6.7)
CK SERPL-CCNC: 162 U/L — SIGNIFICANT CHANGE UP (ref 30–200)
CO2 SERPL-SCNC: 24 MMOL/L — SIGNIFICANT CHANGE UP (ref 22–29)
CO2 SERPL-SCNC: 25 MMOL/L — SIGNIFICANT CHANGE UP (ref 22–29)
CREAT SERPL-MCNC: 0.74 MG/DL — SIGNIFICANT CHANGE UP (ref 0.5–1.3)
CREAT SERPL-MCNC: 0.75 MG/DL — SIGNIFICANT CHANGE UP (ref 0.5–1.3)
CRP SERPL-MCNC: 230 MG/L — HIGH
EGFR: 92 ML/MIN/1.73M2 — SIGNIFICANT CHANGE UP
EGFR: 93 ML/MIN/1.73M2 — SIGNIFICANT CHANGE UP
EOSINOPHIL # BLD AUTO: 0 K/UL — SIGNIFICANT CHANGE UP (ref 0–0.5)
EOSINOPHIL NFR BLD AUTO: 0 % — SIGNIFICANT CHANGE UP (ref 0–6)
GLUCOSE SERPL-MCNC: 146 MG/DL — HIGH (ref 70–99)
GLUCOSE SERPL-MCNC: 181 MG/DL — HIGH (ref 70–99)
HCT VFR BLD CALC: 43.4 % — SIGNIFICANT CHANGE UP (ref 39–50)
HGB BLD-MCNC: 15.1 G/DL — SIGNIFICANT CHANGE UP (ref 13–17)
IMM GRANULOCYTES NFR BLD AUTO: 1 % — SIGNIFICANT CHANGE UP (ref 0–1.5)
LYMPHOCYTES # BLD AUTO: 0.43 K/UL — LOW (ref 1–3.3)
LYMPHOCYTES # BLD AUTO: 2.8 % — LOW (ref 13–44)
MAGNESIUM SERPL-MCNC: 2.4 MG/DL — SIGNIFICANT CHANGE UP (ref 1.6–2.6)
MCHC RBC-ENTMCNC: 33.6 PG — SIGNIFICANT CHANGE UP (ref 27–34)
MCHC RBC-ENTMCNC: 34.8 GM/DL — SIGNIFICANT CHANGE UP (ref 32–36)
MCV RBC AUTO: 96.4 FL — SIGNIFICANT CHANGE UP (ref 80–100)
MONOCYTES # BLD AUTO: 0.36 K/UL — SIGNIFICANT CHANGE UP (ref 0–0.9)
MONOCYTES NFR BLD AUTO: 2.3 % — SIGNIFICANT CHANGE UP (ref 2–14)
NEUTROPHILS # BLD AUTO: 14.48 K/UL — HIGH (ref 1.8–7.4)
NEUTROPHILS NFR BLD AUTO: 93.8 % — HIGH (ref 43–77)
PHOSPHATE SERPL-MCNC: 2.9 MG/DL — SIGNIFICANT CHANGE UP (ref 2.4–4.7)
PLATELET # BLD AUTO: 178 K/UL — SIGNIFICANT CHANGE UP (ref 150–400)
POTASSIUM SERPL-MCNC: 2.6 MMOL/L — CRITICAL LOW (ref 3.5–5.3)
POTASSIUM SERPL-MCNC: 3.7 MMOL/L — SIGNIFICANT CHANGE UP (ref 3.5–5.3)
POTASSIUM SERPL-MCNC: 3.9 MMOL/L — SIGNIFICANT CHANGE UP (ref 3.5–5.3)
POTASSIUM SERPL-SCNC: 2.6 MMOL/L — CRITICAL LOW (ref 3.5–5.3)
POTASSIUM SERPL-SCNC: 3.7 MMOL/L — SIGNIFICANT CHANGE UP (ref 3.5–5.3)
POTASSIUM SERPL-SCNC: 3.9 MMOL/L — SIGNIFICANT CHANGE UP (ref 3.5–5.3)
PROCALCITONIN SERPL-MCNC: 0.2 NG/ML — HIGH (ref 0.02–0.1)
PROT SERPL-MCNC: 5.8 G/DL — LOW (ref 6.6–8.7)
RBC # BLD: 4.5 M/UL — SIGNIFICANT CHANGE UP (ref 4.2–5.8)
RBC # FLD: 14.2 % — SIGNIFICANT CHANGE UP (ref 10.3–14.5)
SODIUM SERPL-SCNC: 142 MMOL/L — SIGNIFICANT CHANGE UP (ref 135–145)
SODIUM SERPL-SCNC: 142 MMOL/L — SIGNIFICANT CHANGE UP (ref 135–145)
TROPONIN T SERPL-MCNC: <0.01 NG/ML — SIGNIFICANT CHANGE UP (ref 0–0.06)
WBC # BLD: 15.45 K/UL — HIGH (ref 3.8–10.5)
WBC # FLD AUTO: 15.45 K/UL — HIGH (ref 3.8–10.5)

## 2022-04-28 PROCEDURE — 99233 SBSQ HOSP IP/OBS HIGH 50: CPT

## 2022-04-28 PROCEDURE — 99223 1ST HOSP IP/OBS HIGH 75: CPT

## 2022-04-28 RX ORDER — ALBUTEROL 90 UG/1
2.5 AEROSOL, METERED ORAL
Refills: 0 | Status: DISCONTINUED | OUTPATIENT
Start: 2022-04-28 | End: 2022-05-18

## 2022-04-28 RX ORDER — POTASSIUM CHLORIDE 20 MEQ
40 PACKET (EA) ORAL EVERY 4 HOURS
Refills: 0 | Status: COMPLETED | OUTPATIENT
Start: 2022-04-28 | End: 2022-04-28

## 2022-04-28 RX ORDER — POTASSIUM CHLORIDE 20 MEQ
10 PACKET (EA) ORAL
Refills: 0 | Status: COMPLETED | OUTPATIENT
Start: 2022-04-28 | End: 2022-04-28

## 2022-04-28 RX ADMIN — Medication 100 MILLIEQUIVALENT(S): at 09:53

## 2022-04-28 RX ADMIN — Medication 20 MILLIGRAM(S): at 05:29

## 2022-04-28 RX ADMIN — Medication 3 MILLILITER(S): at 14:09

## 2022-04-28 RX ADMIN — Medication 75 MILLIGRAM(S): at 05:30

## 2022-04-28 RX ADMIN — CARVEDILOL PHOSPHATE 6.25 MILLIGRAM(S): 80 CAPSULE, EXTENDED RELEASE ORAL at 16:32

## 2022-04-28 RX ADMIN — ENOXAPARIN SODIUM 40 MILLIGRAM(S): 100 INJECTION SUBCUTANEOUS at 05:30

## 2022-04-28 RX ADMIN — AMLODIPINE BESYLATE 5 MILLIGRAM(S): 2.5 TABLET ORAL at 05:29

## 2022-04-28 RX ADMIN — Medication 40 MILLIEQUIVALENT(S): at 07:39

## 2022-04-28 RX ADMIN — CARVEDILOL PHOSPHATE 6.25 MILLIGRAM(S): 80 CAPSULE, EXTENDED RELEASE ORAL at 05:29

## 2022-04-28 RX ADMIN — Medication 81 MILLIGRAM(S): at 10:30

## 2022-04-28 RX ADMIN — ATORVASTATIN CALCIUM 40 MILLIGRAM(S): 80 TABLET, FILM COATED ORAL at 21:44

## 2022-04-28 RX ADMIN — Medication 100 MILLIEQUIVALENT(S): at 07:38

## 2022-04-28 RX ADMIN — SACUBITRIL AND VALSARTAN 1 TABLET(S): 24; 26 TABLET, FILM COATED ORAL at 05:29

## 2022-04-28 RX ADMIN — Medication 3 MILLILITER(S): at 04:46

## 2022-04-28 RX ADMIN — Medication 75 MILLIGRAM(S): at 16:32

## 2022-04-28 RX ADMIN — Medication 40 MILLIEQUIVALENT(S): at 09:53

## 2022-04-28 RX ADMIN — CLOPIDOGREL BISULFATE 75 MILLIGRAM(S): 75 TABLET, FILM COATED ORAL at 10:30

## 2022-04-28 RX ADMIN — Medication 3 MILLILITER(S): at 20:26

## 2022-04-28 RX ADMIN — Medication 40 MILLIGRAM(S): at 05:22

## 2022-04-28 RX ADMIN — AMIODARONE HYDROCHLORIDE 200 MILLIGRAM(S): 400 TABLET ORAL at 05:29

## 2022-04-28 RX ADMIN — SACUBITRIL AND VALSARTAN 1 TABLET(S): 24; 26 TABLET, FILM COATED ORAL at 16:32

## 2022-04-28 RX ADMIN — Medication 100 MILLIEQUIVALENT(S): at 08:52

## 2022-04-28 RX ADMIN — Medication 3 MILLILITER(S): at 08:19

## 2022-04-28 NOTE — PROGRESS NOTE ADULT - ASSESSMENT
ASSESSMENT:  76M with PMHX MI/CAD, HTN, HLD, CHFrEF, ICM s/p AICD presents to [facility] ER c/o SOB/CARL and Nonproductive Cough x1-2 days s/p failed outpt abx for PNA admitted for Acute Hypoxic Respiratory Failure 2/2 Viral PNA/Influenza A r/o ARDS.      Acute Hypoxic Respiratory Failure 2/2 multifocal viral PNA from Influenza A, requires HFNC  - PE was ruled out  - weaning off oxygen as tolerated, goal spo> 89   - d/c steroids  - c/w Ozeltamevir   - supportive care with IS, mucinex, tylenol, duonebs   - Pulmonology consult noted    Leukocytosis  - reactive  - will observe off Abx and trend CBC.    Hypokalemia  - repleated, bmp later today @1700, c/w tele    MI/CAD, HTN, HLD, CHFrEF, ICM s/p AICD  -ASA 81mg q24 + Plavix 75mg PO q24 for DAPT  -Amiodarone 200mg q24 for VT  -Coreg 6.25mg PO BID   -Amlodipine 5mg PO q24  -Entresto 49/51mg PO BID  -Atorvastatin 40mg PO q24  -Lasix 20mg PO q24    # DVT ppx - lovenox  # code/social - full code, son update over phone  # Dispo - acute, will stay over weekend

## 2022-04-28 NOTE — CONSULT NOTE ADULT - ASSESSMENT
- Hypoxic respiratory failure  - Influenza A lower respiratory infection  - B/L GGO on CT  - Leukocytosis  - Hypokalemia    Recommendations  C/w Tamiflu. Would have low threshold to start ABx if suspicion for superimposed bacterial PNA arises. Monitor O2 requirements - he is on HFNC at 100%. Would encourage awake proning during the day. Steroids D/Akash, would avoid as it has increased mortality in influenza A, however if bronchospasm acutely worsens then can resume. C/w ATC DuoNebs and Q2H albuterol PRN. Would have GOC discussions. Low threshold for ICU evaluation.    D/w hospitalist Dr. Saul Qureshi M.D.  Pulmonary & Critical Care Medicine  [facility]  Pulmonary and Sleep Medicine at [place]  [address]  [place], [place] [address]  T: [phone]  F: [phone]

## 2022-04-29 ENCOUNTER — APPOINTMENT (OUTPATIENT)
Dept: FAMILY MEDICINE | Facility: CLINIC | Age: 77
End: 2022-04-29

## 2022-04-29 LAB
ANION GAP SERPL CALC-SCNC: 12 MMOL/L — SIGNIFICANT CHANGE UP (ref 5–17)
BUN SERPL-MCNC: 23.7 MG/DL — HIGH (ref 8–20)
CALCIUM SERPL-MCNC: 8.9 MG/DL — SIGNIFICANT CHANGE UP (ref 8.6–10.2)
CHLORIDE SERPL-SCNC: 105 MMOL/L — SIGNIFICANT CHANGE UP (ref 98–107)
CO2 SERPL-SCNC: 24 MMOL/L — SIGNIFICANT CHANGE UP (ref 22–29)
CREAT SERPL-MCNC: 0.7 MG/DL — SIGNIFICANT CHANGE UP (ref 0.5–1.3)
EGFR: 94 ML/MIN/1.73M2 — SIGNIFICANT CHANGE UP
GLUCOSE SERPL-MCNC: 139 MG/DL — HIGH (ref 70–99)
HCT VFR BLD CALC: 41.8 % — SIGNIFICANT CHANGE UP (ref 39–50)
HGB BLD-MCNC: 14.4 G/DL — SIGNIFICANT CHANGE UP (ref 13–17)
MCHC RBC-ENTMCNC: 33.6 PG — SIGNIFICANT CHANGE UP (ref 27–34)
MCHC RBC-ENTMCNC: 34.4 GM/DL — SIGNIFICANT CHANGE UP (ref 32–36)
MCV RBC AUTO: 97.7 FL — SIGNIFICANT CHANGE UP (ref 80–100)
PLATELET # BLD AUTO: 221 K/UL — SIGNIFICANT CHANGE UP (ref 150–400)
POTASSIUM SERPL-MCNC: 3.3 MMOL/L — LOW (ref 3.5–5.3)
POTASSIUM SERPL-SCNC: 3.3 MMOL/L — LOW (ref 3.5–5.3)
RBC # BLD: 4.28 M/UL — SIGNIFICANT CHANGE UP (ref 4.2–5.8)
RBC # FLD: 14.4 % — SIGNIFICANT CHANGE UP (ref 10.3–14.5)
SODIUM SERPL-SCNC: 141 MMOL/L — SIGNIFICANT CHANGE UP (ref 135–145)
URINE CULTURE <10: ABNORMAL
WBC # BLD: 22.87 K/UL — HIGH (ref 3.8–10.5)
WBC # FLD AUTO: 22.87 K/UL — HIGH (ref 3.8–10.5)

## 2022-04-29 PROCEDURE — 71045 X-RAY EXAM CHEST 1 VIEW: CPT | Mod: 26

## 2022-04-29 PROCEDURE — 99233 SBSQ HOSP IP/OBS HIGH 50: CPT

## 2022-04-29 RX ORDER — CEFTRIAXONE 500 MG/1
1000 INJECTION, POWDER, FOR SOLUTION INTRAMUSCULAR; INTRAVENOUS EVERY 24 HOURS
Refills: 0 | Status: DISCONTINUED | OUTPATIENT
Start: 2022-04-29 | End: 2022-05-01

## 2022-04-29 RX ORDER — POTASSIUM CHLORIDE 20 MEQ
40 PACKET (EA) ORAL ONCE
Refills: 0 | Status: COMPLETED | OUTPATIENT
Start: 2022-04-29 | End: 2022-04-29

## 2022-04-29 RX ORDER — SODIUM CHLORIDE 9 MG/ML
1000 INJECTION INTRAMUSCULAR; INTRAVENOUS; SUBCUTANEOUS
Refills: 0 | Status: DISCONTINUED | OUTPATIENT
Start: 2022-04-29 | End: 2022-04-30

## 2022-04-29 RX ORDER — SACUBITRIL AND VALSARTAN 24; 26 MG/1; MG/1
1 TABLET, FILM COATED ORAL
Refills: 0 | Status: DISCONTINUED | OUTPATIENT
Start: 2022-04-29 | End: 2022-05-18

## 2022-04-29 RX ORDER — AZITHROMYCIN 500 MG/1
500 TABLET, FILM COATED ORAL EVERY 24 HOURS
Refills: 0 | Status: COMPLETED | OUTPATIENT
Start: 2022-04-29 | End: 2022-05-03

## 2022-04-29 RX ADMIN — CEFTRIAXONE 100 MILLIGRAM(S): 500 INJECTION, POWDER, FOR SOLUTION INTRAMUSCULAR; INTRAVENOUS at 17:38

## 2022-04-29 RX ADMIN — ENOXAPARIN SODIUM 40 MILLIGRAM(S): 100 INJECTION SUBCUTANEOUS at 05:46

## 2022-04-29 RX ADMIN — AMIODARONE HYDROCHLORIDE 200 MILLIGRAM(S): 400 TABLET ORAL at 05:46

## 2022-04-29 RX ADMIN — Medication 3 MILLILITER(S): at 02:10

## 2022-04-29 RX ADMIN — Medication 3 MILLILITER(S): at 08:37

## 2022-04-29 RX ADMIN — Medication 40 MILLIEQUIVALENT(S): at 12:32

## 2022-04-29 RX ADMIN — Medication 75 MILLIGRAM(S): at 17:37

## 2022-04-29 RX ADMIN — CARVEDILOL PHOSPHATE 6.25 MILLIGRAM(S): 80 CAPSULE, EXTENDED RELEASE ORAL at 17:37

## 2022-04-29 RX ADMIN — Medication 75 MILLIGRAM(S): at 05:46

## 2022-04-29 RX ADMIN — AMLODIPINE BESYLATE 5 MILLIGRAM(S): 2.5 TABLET ORAL at 05:46

## 2022-04-29 RX ADMIN — CARVEDILOL PHOSPHATE 6.25 MILLIGRAM(S): 80 CAPSULE, EXTENDED RELEASE ORAL at 05:46

## 2022-04-29 RX ADMIN — SACUBITRIL AND VALSARTAN 1 TABLET(S): 24; 26 TABLET, FILM COATED ORAL at 05:46

## 2022-04-29 RX ADMIN — SACUBITRIL AND VALSARTAN 1 TABLET(S): 24; 26 TABLET, FILM COATED ORAL at 17:38

## 2022-04-29 RX ADMIN — Medication 3 MILLILITER(S): at 14:48

## 2022-04-29 RX ADMIN — SODIUM CHLORIDE 75 MILLILITER(S): 9 INJECTION INTRAMUSCULAR; INTRAVENOUS; SUBCUTANEOUS at 17:39

## 2022-04-29 RX ADMIN — ATORVASTATIN CALCIUM 40 MILLIGRAM(S): 80 TABLET, FILM COATED ORAL at 21:41

## 2022-04-29 RX ADMIN — Medication 3 MILLILITER(S): at 20:42

## 2022-04-29 RX ADMIN — CLOPIDOGREL BISULFATE 75 MILLIGRAM(S): 75 TABLET, FILM COATED ORAL at 12:32

## 2022-04-29 RX ADMIN — Medication 40 MILLIGRAM(S): at 17:37

## 2022-04-29 RX ADMIN — Medication 1200 MILLIGRAM(S): at 17:37

## 2022-04-29 RX ADMIN — Medication 20 MILLIGRAM(S): at 05:48

## 2022-04-29 RX ADMIN — Medication 81 MILLIGRAM(S): at 12:32

## 2022-04-29 RX ADMIN — AZITHROMYCIN 255 MILLIGRAM(S): 500 TABLET, FILM COATED ORAL at 17:38

## 2022-04-29 NOTE — PROGRESS NOTE ADULT - ASSESSMENT
ASSESSMENT:  76M with PMHX MI/CAD, HTN, HLD, CHFrEF, ICM s/p AICD presents to [facility] ER c/o SOB/CARL and Nonproductive Cough x1-2 days s/p failed outpt abx for PNA admitted for Acute Hypoxic Respiratory Failure 2/2 Viral PNA/Influenza A r/o ARDS.      Acute Hypoxic Respiratory Failure 2/2 multifocal viral PNA from Influenza A, continue to need HFNC  Can't rule out superimposed bacterial infection given resing Leucocytosis   - PE was ruled out  - weaning off oxygen as tolerated, goal spo> 89   - c/w Ozeltamevir and steroids as per Pulmonology team recommendations  - started Azithromycin and Ceftriaxon d1/5  - supportive care with IS, mucinex, tylenol, duonebs  - will obtain mrsa swab      Hypokalemia, noted  - repleated as needed  - bmp daily    MI/CAD, HTN, HLD, CHFrEF, ICM s/p AICD  -ASA 81mg q24 + Plavix 75mg PO q24 for DAPT  -Amiodarone 200mg q24 for VT  -Coreg 6.25mg PO BID   -Amlodipine 5mg PO q24  -Entresto 49/51mg PO BID  -Atorvastatin 40mg PO q24  - Lasix 20mg PO q24    # DVT ppx - lovenox  # code/social - full code, son update over phone  # Dispo - acute, weaning off HFNC ASSESSMENT:  76M with PMHX MI/CAD, HTN, HLD, CHFrEF, ICM s/p AICD presents to [facility] ER c/o SOB/CARL and Nonproductive Cough x1-2 days s/p failed outpt abx for PNA admitted for Acute Hypoxic Respiratory Failure 2/2 Viral PNA/Influenza A r/o ARDS.      Acute Hypoxic Respiratory Failure 2/2 multifocal viral PNA from Influenza A, continue to need HFNC  Can't rule out superimposed bacterial infection given resing Leucocytosis   - PE was ruled out  - weaning off oxygen as tolerated, goal spo> 89   - c/w Ozeltamevir and steroids as per Pulmonology team recommendations  - started Azithromycin and Ceftriaxon d1/5  - supportive care with IS, mucinex, tylenol, duonebs  - will obtain mrsa swab      Hypokalemia, noted  - repleated as needed  - bmp daily    MI/CAD, HTN, HLD, CHFrEF, ICM s/p AICD  -ASA 81mg q24 + Plavix 75mg PO q24 for DAPT  -Amiodarone 200mg q24 for VT  -Coreg 6.25mg PO BID   -Amlodipine 5mg PO q24  -decreased Entresto to 24/26 mg PO BID  -Atorvastatin 40mg PO q24  - d/c Lasix 20mg PO q24 for now    # DVT ppx - lovenox  # code/social - full code, son update over phone  # Dispo - acute, weaning off HFNC ASSESSMENT:  76M with PMHX MI/CAD, HTN, HLD, CHFrEF, ICM s/p AICD presents to [facility] ER c/o SOB/CARL and Nonproductive Cough x1-2 days s/p failed outpt abx for PNA admitted for Acute Hypoxic Respiratory Failure 2/2 Viral PNA/Influenza A r/o ARDS.      Acute Hypoxic Respiratory Failure 2/2 multifocal viral PNA from Influenza A, continue to need HFNC  Can't rule out superimposed bacterial infection given resing Leucocytosis   - PE was ruled out  - weaning off oxygen as tolerated, goal spo> 89   - c/w Ozeltamevir and steroids as per Pulmonology team recommendations  - started Azithromycin and Ceftriaxon d1/5  - supportive care with IS, mucinex, tylenol, duonebs  - will obtain mrsa swab      Hypokalemia, noted  - repleated as needed  - bmp daily    MI/CAD, HTN, HLD, CHFrEF, ICM s/p AICD  BP is on low normal side, pt mentating well on it  -ASA 81mg q24 + Plavix 75mg PO q24 for DAPT  -Amiodarone 200mg q24 for VT  -Coreg 6.25mg PO BID   -Amlodipine 5mg PO q24  -decreased Entresto to 24/26 mg PO BID  -Atorvastatin 40mg PO q24  - d/c Lasix 20mg PO q24 for now    # DVT ppx - lovenox  # code/social - full code, son update over phone  # Dispo - acute, weaning off HFNC

## 2022-04-29 NOTE — CHART NOTE - NSCHARTNOTEFT_GEN_A_CORE
01:00 note: Notified by RN pt desated down to 85% on HFNC while sleeping. RN applied NRB, O2 sat increased to 91%.  VS: T 97.6 F oral, /61, P 57, RR 18, O2 sat 88%   Patient seen at bedside sleeping, does not appear to be in any signs of acute distress.   Patient awakened, A&O x3. Pt denies SOB, CP, chest pressure, dizziness, lightheadedness.  Pt asking for NRB to be removed. NRB removed and pt monitored at bedside.  Patient awake and having a discussion with PA about his granddaughter. Patient able to speak in full sentences without difficulty on HFNC. Pt satting 95-97% while awake and speaking with provider.   Lungs CTA B/l, no wheezing, rhonchi, rales. Respirations even and unlabored. No accessory muscle use.  Cardiac: RRR, +S1/S2  Pt stable at this time, no s/s respiratory distress.  RN instructed to continue to monitor pt and escalate PRN

## 2022-04-30 PROCEDURE — 99233 SBSQ HOSP IP/OBS HIGH 50: CPT

## 2022-04-30 RX ADMIN — CARVEDILOL PHOSPHATE 6.25 MILLIGRAM(S): 80 CAPSULE, EXTENDED RELEASE ORAL at 17:52

## 2022-04-30 RX ADMIN — SACUBITRIL AND VALSARTAN 1 TABLET(S): 24; 26 TABLET, FILM COATED ORAL at 05:25

## 2022-04-30 RX ADMIN — Medication 81 MILLIGRAM(S): at 12:56

## 2022-04-30 RX ADMIN — AMIODARONE HYDROCHLORIDE 200 MILLIGRAM(S): 400 TABLET ORAL at 05:24

## 2022-04-30 RX ADMIN — Medication 3 MILLILITER(S): at 09:50

## 2022-04-30 RX ADMIN — Medication 100 MILLIGRAM(S): at 18:09

## 2022-04-30 RX ADMIN — CLOPIDOGREL BISULFATE 75 MILLIGRAM(S): 75 TABLET, FILM COATED ORAL at 12:57

## 2022-04-30 RX ADMIN — Medication 3 MILLILITER(S): at 03:59

## 2022-04-30 RX ADMIN — Medication 1200 MILLIGRAM(S): at 17:50

## 2022-04-30 RX ADMIN — Medication 3 MILLILITER(S): at 15:22

## 2022-04-30 RX ADMIN — CARVEDILOL PHOSPHATE 6.25 MILLIGRAM(S): 80 CAPSULE, EXTENDED RELEASE ORAL at 05:24

## 2022-04-30 RX ADMIN — ATORVASTATIN CALCIUM 40 MILLIGRAM(S): 80 TABLET, FILM COATED ORAL at 21:43

## 2022-04-30 RX ADMIN — Medication 100 MILLIGRAM(S): at 21:43

## 2022-04-30 RX ADMIN — Medication 75 MILLIGRAM(S): at 05:24

## 2022-04-30 RX ADMIN — ENOXAPARIN SODIUM 40 MILLIGRAM(S): 100 INJECTION SUBCUTANEOUS at 05:24

## 2022-04-30 RX ADMIN — Medication 3 MILLILITER(S): at 21:37

## 2022-04-30 RX ADMIN — Medication 40 MILLIGRAM(S): at 05:24

## 2022-04-30 RX ADMIN — CEFTRIAXONE 100 MILLIGRAM(S): 500 INJECTION, POWDER, FOR SOLUTION INTRAMUSCULAR; INTRAVENOUS at 17:48

## 2022-04-30 RX ADMIN — AZITHROMYCIN 255 MILLIGRAM(S): 500 TABLET, FILM COATED ORAL at 17:48

## 2022-04-30 RX ADMIN — SACUBITRIL AND VALSARTAN 1 TABLET(S): 24; 26 TABLET, FILM COATED ORAL at 19:09

## 2022-04-30 RX ADMIN — Medication 75 MILLIGRAM(S): at 17:51

## 2022-04-30 RX ADMIN — Medication 1200 MILLIGRAM(S): at 05:24

## 2022-04-30 RX ADMIN — AMLODIPINE BESYLATE 5 MILLIGRAM(S): 2.5 TABLET ORAL at 05:24

## 2022-04-30 RX ADMIN — Medication 3 MILLIGRAM(S): at 21:43

## 2022-04-30 NOTE — PROGRESS NOTE ADULT - ASSESSMENT
Imp--Influenza A with surendra inf, acute hypoxemic resp failure.  Sats stabilizing on steroids  Increased WBC from prednisone.  Plan--Continue steroids, abx, Tamiflu.  Lower O2 when able.

## 2022-04-30 NOTE — PROGRESS NOTE ADULT - ASSESSMENT
76M with PMHX MI/CAD, HTN, HLD, CHFrEF, ICM s/p AICD presents to [facility] ER c/o SOB/CARL and Nonproductive Cough x1-2 days s/p failed outpt abx for PNA admitted for Acute Hypoxic Respiratory Failure 2/2 Viral PNA/Influenza and possible superimposed bacterial PNA    #Acute Hypoxic Respiratory Failure 2/2 multifocal viral PNA from Influenza A, continue to need HFNC  - PE was ruled out on CTA  - weaning off oxygen as tolerated, goal spo> 89   - c/w Ozeltamevir and steroids as per Pulmonology team recommendations  - c/w Azithromycin and Ceftriaxon d2/5  - aggressive chest PT  - prone as tolerated  - supportive care with IS, mucinex, tylenol, duonebs    #Leukocytossi  - infectious with recent steroid use  - daily CBC  - management as above    #MI/CAD  #HTN/HLD  #Chronic CHFrEF  #ICM s/p AICD  -ASA 81mg q24 + Plavix 75mg PO q24 for DAPT  -Amiodarone 200mg q24 for VT  -Coreg 6.25mg PO BID   -Amlodipine 5mg PO q24  -decreased Entresto to 24/26 mg PO BID  -Atorvastatin 40mg PO q24  -dc Lasix 20mg PO, resume once BP more elevated    DVT ppx - lovenox  Diet - DASH/TLC  Dispo - acute, weaning off HFNC    code/social - full code, families updated at bedside 76M with PMHX MI/CAD, HTN, HLD, CHFrEF, ICM s/p AICD presents to [facility] ER c/o SOB/CARL and Nonproductive Cough x1-2 days s/p failed outpt abx for PNA admitted for Acute Hypoxic Respiratory Failure 2/2 Viral PNA/Influenza and possible superimposed bacterial PNA    #Acute Hypoxic Respiratory Failure 2/2 multifocal viral PNA from Influenza A, continue to need HFNC  - PE was ruled out on CTA  - weaning off oxygen as tolerated, goal spo> 89   - c/w Ozeltamevir and steroids as per Pulmonology team recommendations  - c/w Azithromycin and Ceftriaxon d2/5  - aggressive chest PT  - prone as tolerated  - supportive care with IS, mucinex, tylenol, duonebs    #Leukocytosis  - infectious with recent steroid use  - daily CBC  - management as above    #MI/CAD  #HTN/HLD  #Chronic CHFrEF  #ICM s/p AICD  -ASA 81mg q24 + Plavix 75mg PO q24 for DAPT  -Amiodarone 200mg q24 for VT  -Coreg 6.25mg PO BID   -Amlodipine 5mg PO q24  -decreased Entresto to 24/26 mg PO BID  -Atorvastatin 40mg PO q24  -dc Lasix 20mg PO, resume once BP more elevated    DVT ppx - lovenox  Diet - DASH/TLC  Dispo - acute, weaning off HFNC    code/social - full code, families updated at bedside

## 2022-05-01 LAB
ANION GAP SERPL CALC-SCNC: 10 MMOL/L — SIGNIFICANT CHANGE UP (ref 5–17)
BUN SERPL-MCNC: 18.5 MG/DL — SIGNIFICANT CHANGE UP (ref 8–20)
CALCIUM SERPL-MCNC: 8.6 MG/DL — SIGNIFICANT CHANGE UP (ref 8.6–10.2)
CHLORIDE SERPL-SCNC: 104 MMOL/L — SIGNIFICANT CHANGE UP (ref 98–107)
CO2 SERPL-SCNC: 25 MMOL/L — SIGNIFICANT CHANGE UP (ref 22–29)
CREAT SERPL-MCNC: 0.65 MG/DL — SIGNIFICANT CHANGE UP (ref 0.5–1.3)
EGFR: 96 ML/MIN/1.73M2 — SIGNIFICANT CHANGE UP
GAS PNL BLDA: SIGNIFICANT CHANGE UP
GLUCOSE SERPL-MCNC: 123 MG/DL — HIGH (ref 70–99)
HCT VFR BLD CALC: 42.4 % — SIGNIFICANT CHANGE UP (ref 39–50)
HGB BLD-MCNC: 14.6 G/DL — SIGNIFICANT CHANGE UP (ref 13–17)
MAGNESIUM SERPL-MCNC: 2.2 MG/DL — SIGNIFICANT CHANGE UP (ref 1.6–2.6)
MCHC RBC-ENTMCNC: 34.1 PG — HIGH (ref 27–34)
MCHC RBC-ENTMCNC: 34.4 GM/DL — SIGNIFICANT CHANGE UP (ref 32–36)
MCV RBC AUTO: 99.1 FL — SIGNIFICANT CHANGE UP (ref 80–100)
MRSA PCR RESULT.: SIGNIFICANT CHANGE UP
PHOSPHATE SERPL-MCNC: 1.8 MG/DL — LOW (ref 2.4–4.7)
PLATELET # BLD AUTO: 277 K/UL — SIGNIFICANT CHANGE UP (ref 150–400)
POTASSIUM SERPL-MCNC: 4.1 MMOL/L — SIGNIFICANT CHANGE UP (ref 3.5–5.3)
POTASSIUM SERPL-SCNC: 4.1 MMOL/L — SIGNIFICANT CHANGE UP (ref 3.5–5.3)
RBC # BLD: 4.28 M/UL — SIGNIFICANT CHANGE UP (ref 4.2–5.8)
RBC # FLD: 14.5 % — SIGNIFICANT CHANGE UP (ref 10.3–14.5)
S AUREUS DNA NOSE QL NAA+PROBE: SIGNIFICANT CHANGE UP
SARS-COV-2 RNA SPEC QL NAA+PROBE: SIGNIFICANT CHANGE UP
SODIUM SERPL-SCNC: 139 MMOL/L — SIGNIFICANT CHANGE UP (ref 135–145)
WBC # BLD: 15.89 K/UL — HIGH (ref 3.8–10.5)
WBC # FLD AUTO: 15.89 K/UL — HIGH (ref 3.8–10.5)

## 2022-05-01 PROCEDURE — 71045 X-RAY EXAM CHEST 1 VIEW: CPT | Mod: 26

## 2022-05-01 PROCEDURE — 99233 SBSQ HOSP IP/OBS HIGH 50: CPT

## 2022-05-01 RX ORDER — PIPERACILLIN AND TAZOBACTAM 4; .5 G/20ML; G/20ML
3.38 INJECTION, POWDER, LYOPHILIZED, FOR SOLUTION INTRAVENOUS EVERY 8 HOURS
Refills: 0 | Status: COMPLETED | OUTPATIENT
Start: 2022-05-01 | End: 2022-05-08

## 2022-05-01 RX ORDER — SODIUM,POTASSIUM PHOSPHATES 278-250MG
1 POWDER IN PACKET (EA) ORAL ONCE
Refills: 0 | Status: COMPLETED | OUTPATIENT
Start: 2022-05-01 | End: 2022-05-01

## 2022-05-01 RX ORDER — HYDROCORTISONE 20 MG
40 TABLET ORAL EVERY 8 HOURS
Refills: 0 | Status: DISCONTINUED | OUTPATIENT
Start: 2022-05-01 | End: 2022-05-01

## 2022-05-01 RX ORDER — PIPERACILLIN AND TAZOBACTAM 4; .5 G/20ML; G/20ML
3.38 INJECTION, POWDER, LYOPHILIZED, FOR SOLUTION INTRAVENOUS ONCE
Refills: 0 | Status: COMPLETED | OUTPATIENT
Start: 2022-05-01 | End: 2022-05-01

## 2022-05-01 RX ORDER — GUAIFENESIN/DEXTROMETHORPHAN 600MG-30MG
10 TABLET, EXTENDED RELEASE 12 HR ORAL EVERY 6 HOURS
Refills: 0 | Status: DISCONTINUED | OUTPATIENT
Start: 2022-05-01 | End: 2022-05-18

## 2022-05-01 RX ADMIN — Medication 75 MILLIGRAM(S): at 17:14

## 2022-05-01 RX ADMIN — Medication 3 MILLILITER(S): at 15:32

## 2022-05-01 RX ADMIN — Medication 100 MILLIGRAM(S): at 13:45

## 2022-05-01 RX ADMIN — CARVEDILOL PHOSPHATE 6.25 MILLIGRAM(S): 80 CAPSULE, EXTENDED RELEASE ORAL at 17:14

## 2022-05-01 RX ADMIN — Medication 3 MILLILITER(S): at 21:16

## 2022-05-01 RX ADMIN — AZITHROMYCIN 255 MILLIGRAM(S): 500 TABLET, FILM COATED ORAL at 15:14

## 2022-05-01 RX ADMIN — CLOPIDOGREL BISULFATE 75 MILLIGRAM(S): 75 TABLET, FILM COATED ORAL at 12:17

## 2022-05-01 RX ADMIN — Medication 3 MILLILITER(S): at 04:09

## 2022-05-01 RX ADMIN — Medication 75 MILLIGRAM(S): at 05:10

## 2022-05-01 RX ADMIN — SACUBITRIL AND VALSARTAN 1 TABLET(S): 24; 26 TABLET, FILM COATED ORAL at 05:09

## 2022-05-01 RX ADMIN — PIPERACILLIN AND TAZOBACTAM 200 GRAM(S): 4; .5 INJECTION, POWDER, LYOPHILIZED, FOR SOLUTION INTRAVENOUS at 09:51

## 2022-05-01 RX ADMIN — Medication 100 MILLIGRAM(S): at 05:09

## 2022-05-01 RX ADMIN — ATORVASTATIN CALCIUM 40 MILLIGRAM(S): 80 TABLET, FILM COATED ORAL at 20:55

## 2022-05-01 RX ADMIN — Medication 1200 MILLIGRAM(S): at 05:10

## 2022-05-01 RX ADMIN — SACUBITRIL AND VALSARTAN 1 TABLET(S): 24; 26 TABLET, FILM COATED ORAL at 17:14

## 2022-05-01 RX ADMIN — Medication 1 PACKET(S): at 09:02

## 2022-05-01 RX ADMIN — Medication 40 MILLIGRAM(S): at 17:14

## 2022-05-01 RX ADMIN — Medication 10 MILLILITER(S): at 16:41

## 2022-05-01 RX ADMIN — AMLODIPINE BESYLATE 5 MILLIGRAM(S): 2.5 TABLET ORAL at 05:10

## 2022-05-01 RX ADMIN — AMIODARONE HYDROCHLORIDE 200 MILLIGRAM(S): 400 TABLET ORAL at 05:10

## 2022-05-01 RX ADMIN — Medication 81 MILLIGRAM(S): at 12:17

## 2022-05-01 RX ADMIN — Medication 40 MILLIGRAM(S): at 05:10

## 2022-05-01 RX ADMIN — PIPERACILLIN AND TAZOBACTAM 25 GRAM(S): 4; .5 INJECTION, POWDER, LYOPHILIZED, FOR SOLUTION INTRAVENOUS at 17:14

## 2022-05-01 RX ADMIN — Medication 40 MILLIGRAM(S): at 09:50

## 2022-05-01 RX ADMIN — CARVEDILOL PHOSPHATE 6.25 MILLIGRAM(S): 80 CAPSULE, EXTENDED RELEASE ORAL at 05:10

## 2022-05-01 RX ADMIN — Medication 3 MILLILITER(S): at 10:15

## 2022-05-01 RX ADMIN — Medication 3 MILLIGRAM(S): at 20:55

## 2022-05-01 RX ADMIN — Medication 100 MILLIGRAM(S): at 20:57

## 2022-05-01 RX ADMIN — ENOXAPARIN SODIUM 40 MILLIGRAM(S): 100 INJECTION SUBCUTANEOUS at 05:10

## 2022-05-01 NOTE — PROGRESS NOTE ADULT - ASSESSMENT
76M with PMHX MI/CAD, HTN, HLD, CHFrEF, ICM s/p AICD presents to [facility] ER c/o SOB/CARL and Nonproductive Cough x1-2 days s/p failed outpt abx for PNA admitted for Acute Hypoxic Respiratory Failure 2/2 Viral PNA/Influenza and possible superimposed bacterial PNA    #Acute Hypoxic Respiratory Failure 2/2 multifocal viral PNA from Influenza A,   - worsening now on HFNC and NRB  - PE was ruled out on CTA  - repeat CT chest in AM if HD stable  - weaning off oxygen as tolerated, goal spo> 89   - c/w Ozeltamevir and steroids IV q8hr as per Pulmonology team recommendations  - c/w Azithromycin d3/5 and broaden to Zosyn d1/5  - aggressive chest PT  - prone as tolerated  - supportive care with IS, mucinex, tylenol, duonebs    #Leukocytosis  - infectious with recent steroid use  - daily CBC  - management as above    #MI/CAD  #HTN/HLD  #Chronic CHFrEF  #ICM s/p AICD  -ASA 81mg q24 + Plavix 75mg PO q24 for DAPT  -Amiodarone 200mg q24 for VT  -Coreg 6.25mg PO BID   -Amlodipine 5mg PO q24  -decreased Entresto to 24/26 mg PO BID  -Atorvastatin 40mg PO q24  -dc Lasix 20mg PO, resume once BP more elevated    DVT ppx - lovenox  Diet - DASH/TLC  Dispo - acute    code/social - DNR/DNI, families updated at bedside 76M with PMHX MI/CAD, HTN, HLD, CHFrEF, ICM s/p AICD presents to [facility] ER c/o SOB/CARL and Nonproductive Cough x1-2 days s/p failed outpt abx for PNA admitted for Acute Hypoxic Respiratory Failure 2/2 Viral PNA/Influenza and possible superimposed bacterial PNA    #Acute Hypoxic Respiratory Failure 2/2 multifocal viral PNA from Influenza A,   - worsening now on HFNC and NRB  - PE was ruled out on CTA  - repeat CT chest in AM if HD stable  - ABG and repeat COVID/RVP  - weaning off oxygen as tolerated, goal spo> 89   - c/w Ozeltamevir and steroids IV q8hr as per Pulmonology team recommendations  - c/w Azithromycin d3/5 and broaden to Zosyn d1/5  - aggressive chest PT  - prone as tolerated  - supportive care with IS, mucinex, tylenol, duonebs    #Leukocytosis  - infectious with recent steroid use  - daily CBC  - management as above    #MI/CAD  #HTN/HLD  #Chronic CHFrEF  #ICM s/p AICD  -ASA 81mg q24 + Plavix 75mg PO q24 for DAPT  -Amiodarone 200mg q24 for VT  -Coreg 6.25mg PO BID   -Amlodipine 5mg PO q24  -decreased Entresto to 24/26 mg PO BID  -Atorvastatin 40mg PO q24  -dc Lasix 20mg PO, resume once BP more elevated    DVT ppx - lovenox  Diet - DASH/TLC  Dispo - acute    code/social - DNR/DNI, families updated at bedside

## 2022-05-01 NOTE — PROGRESS NOTE ADULT - ASSESSMENT
Imp--Flu A with bilat inf, ongoing hypoxemia.  Plan--repeat CT chest in AM  Continue abx, steroids.  Repeat Covid swab pending.  Remains at risk for intubation.

## 2022-05-01 NOTE — GOALS OF CARE CONVERSATION - ADVANCED CARE PLANNING - CONVERSATION DETAILS
Patient requesting to fill out MOLST form, daughter at bedside. Patient states that he would not want CPR in the event of cardiac arrest. Additionally, he states he would want non-invasive ventilation such as BiPAP if medically necessary but would not want to be intubated. Daughter supportive of patient decision. MOLST completed with patient, signed and witnessed, placed in chart. DNR order placed.

## 2022-05-02 DIAGNOSIS — J96.01 ACUTE RESPIRATORY FAILURE WITH HYPOXIA: ICD-10-CM

## 2022-05-02 DIAGNOSIS — R91.8 OTHER NONSPECIFIC ABNORMAL FINDING OF LUNG FIELD: ICD-10-CM

## 2022-05-02 DIAGNOSIS — R09.89 OTHER SPECIFIED SYMPTOMS AND SIGNS INVOLVING THE CIRCULATORY AND RESPIRATORY SYSTEMS: ICD-10-CM

## 2022-05-02 LAB
ANION GAP SERPL CALC-SCNC: 12 MMOL/L — SIGNIFICANT CHANGE UP (ref 5–17)
BUN SERPL-MCNC: 21.6 MG/DL — HIGH (ref 8–20)
CALCIUM SERPL-MCNC: 8.8 MG/DL — SIGNIFICANT CHANGE UP (ref 8.6–10.2)
CHLORIDE SERPL-SCNC: 104 MMOL/L — SIGNIFICANT CHANGE UP (ref 98–107)
CO2 SERPL-SCNC: 24 MMOL/L — SIGNIFICANT CHANGE UP (ref 22–29)
CREAT SERPL-MCNC: 0.53 MG/DL — SIGNIFICANT CHANGE UP (ref 0.5–1.3)
CULTURE RESULTS: SIGNIFICANT CHANGE UP
EGFR: 103 ML/MIN/1.73M2 — SIGNIFICANT CHANGE UP
GLUCOSE SERPL-MCNC: 201 MG/DL — HIGH (ref 70–99)
HCT VFR BLD CALC: 42.3 % — SIGNIFICANT CHANGE UP (ref 39–50)
HGB BLD-MCNC: 14.1 G/DL — SIGNIFICANT CHANGE UP (ref 13–17)
MAGNESIUM SERPL-MCNC: 2.4 MG/DL — SIGNIFICANT CHANGE UP (ref 1.6–2.6)
MCHC RBC-ENTMCNC: 33.1 PG — SIGNIFICANT CHANGE UP (ref 27–34)
MCHC RBC-ENTMCNC: 33.3 GM/DL — SIGNIFICANT CHANGE UP (ref 32–36)
MCV RBC AUTO: 99.3 FL — SIGNIFICANT CHANGE UP (ref 80–100)
MRSA PCR RESULT.: SIGNIFICANT CHANGE UP
PHOSPHATE SERPL-MCNC: 2.8 MG/DL — SIGNIFICANT CHANGE UP (ref 2.4–4.7)
PLATELET # BLD AUTO: 306 K/UL — SIGNIFICANT CHANGE UP (ref 150–400)
POTASSIUM SERPL-MCNC: 3.8 MMOL/L — SIGNIFICANT CHANGE UP (ref 3.5–5.3)
POTASSIUM SERPL-SCNC: 3.8 MMOL/L — SIGNIFICANT CHANGE UP (ref 3.5–5.3)
RBC # BLD: 4.26 M/UL — SIGNIFICANT CHANGE UP (ref 4.2–5.8)
RBC # FLD: 13.9 % — SIGNIFICANT CHANGE UP (ref 10.3–14.5)
S AUREUS DNA NOSE QL NAA+PROBE: SIGNIFICANT CHANGE UP
SODIUM SERPL-SCNC: 140 MMOL/L — SIGNIFICANT CHANGE UP (ref 135–145)
SPECIMEN SOURCE: SIGNIFICANT CHANGE UP
WBC # BLD: 16.23 K/UL — HIGH (ref 3.8–10.5)
WBC # FLD AUTO: 16.23 K/UL — HIGH (ref 3.8–10.5)

## 2022-05-02 PROCEDURE — 99233 SBSQ HOSP IP/OBS HIGH 50: CPT

## 2022-05-02 PROCEDURE — 99232 SBSQ HOSP IP/OBS MODERATE 35: CPT

## 2022-05-02 RX ORDER — LANOLIN ALCOHOL/MO/W.PET/CERES
5 CREAM (GRAM) TOPICAL AT BEDTIME
Refills: 0 | Status: DISCONTINUED | OUTPATIENT
Start: 2022-05-02 | End: 2022-05-18

## 2022-05-02 RX ADMIN — Medication 75 MILLIGRAM(S): at 17:20

## 2022-05-02 RX ADMIN — SACUBITRIL AND VALSARTAN 1 TABLET(S): 24; 26 TABLET, FILM COATED ORAL at 17:20

## 2022-05-02 RX ADMIN — CARVEDILOL PHOSPHATE 6.25 MILLIGRAM(S): 80 CAPSULE, EXTENDED RELEASE ORAL at 05:26

## 2022-05-02 RX ADMIN — Medication 3 MILLILITER(S): at 16:28

## 2022-05-02 RX ADMIN — SACUBITRIL AND VALSARTAN 1 TABLET(S): 24; 26 TABLET, FILM COATED ORAL at 05:25

## 2022-05-02 RX ADMIN — ENOXAPARIN SODIUM 40 MILLIGRAM(S): 100 INJECTION SUBCUTANEOUS at 06:01

## 2022-05-02 RX ADMIN — ALBUTEROL 2.5 MILLIGRAM(S): 90 AEROSOL, METERED ORAL at 20:19

## 2022-05-02 RX ADMIN — Medication 40 MILLIGRAM(S): at 10:44

## 2022-05-02 RX ADMIN — AZITHROMYCIN 255 MILLIGRAM(S): 500 TABLET, FILM COATED ORAL at 17:20

## 2022-05-02 RX ADMIN — Medication 40 MILLIGRAM(S): at 02:04

## 2022-05-02 RX ADMIN — ATORVASTATIN CALCIUM 40 MILLIGRAM(S): 80 TABLET, FILM COATED ORAL at 21:46

## 2022-05-02 RX ADMIN — Medication 100 MILLIGRAM(S): at 05:25

## 2022-05-02 RX ADMIN — AMLODIPINE BESYLATE 5 MILLIGRAM(S): 2.5 TABLET ORAL at 05:25

## 2022-05-02 RX ADMIN — Medication 81 MILLIGRAM(S): at 13:22

## 2022-05-02 RX ADMIN — Medication 100 MILLIGRAM(S): at 21:46

## 2022-05-02 RX ADMIN — CARVEDILOL PHOSPHATE 6.25 MILLIGRAM(S): 80 CAPSULE, EXTENDED RELEASE ORAL at 17:20

## 2022-05-02 RX ADMIN — Medication 3 MILLILITER(S): at 09:52

## 2022-05-02 RX ADMIN — CLOPIDOGREL BISULFATE 75 MILLIGRAM(S): 75 TABLET, FILM COATED ORAL at 13:22

## 2022-05-02 RX ADMIN — Medication 3 MILLILITER(S): at 04:45

## 2022-05-02 RX ADMIN — AMIODARONE HYDROCHLORIDE 200 MILLIGRAM(S): 400 TABLET ORAL at 05:26

## 2022-05-02 RX ADMIN — PIPERACILLIN AND TAZOBACTAM 25 GRAM(S): 4; .5 INJECTION, POWDER, LYOPHILIZED, FOR SOLUTION INTRAVENOUS at 17:21

## 2022-05-02 RX ADMIN — PIPERACILLIN AND TAZOBACTAM 25 GRAM(S): 4; .5 INJECTION, POWDER, LYOPHILIZED, FOR SOLUTION INTRAVENOUS at 02:06

## 2022-05-02 RX ADMIN — ALBUTEROL 2.5 MILLIGRAM(S): 90 AEROSOL, METERED ORAL at 18:16

## 2022-05-02 RX ADMIN — PIPERACILLIN AND TAZOBACTAM 25 GRAM(S): 4; .5 INJECTION, POWDER, LYOPHILIZED, FOR SOLUTION INTRAVENOUS at 10:44

## 2022-05-02 RX ADMIN — Medication 3 MILLILITER(S): at 20:19

## 2022-05-02 RX ADMIN — Medication 40 MILLIGRAM(S): at 17:20

## 2022-05-02 RX ADMIN — Medication 5 MILLIGRAM(S): at 21:46

## 2022-05-02 RX ADMIN — Medication 75 MILLIGRAM(S): at 05:25

## 2022-05-02 RX ADMIN — Medication 100 MILLIGRAM(S): at 13:22

## 2022-05-02 NOTE — CHART NOTE - NSCHARTNOTEFT_GEN_A_CORE
Alerted by RN patient desaturating on Hiflow to ~84%, was titrated down to 40 L/min // 60% O2 around 4 pm. RN placed NRB on patient with improvement in O2 sats to > 92%. Seen and assessed at bedside, patient is without acute complaints, did not have any symptoms during desaturation & states he's starting to feel more like himself today. Denies lightheadedness, dyspnea, chest pain, palpitations. Alerted by RN patient desaturating on Hiflow to ~84% with good pleth, was titrated down to 40 L/min // 60% O2 around 4 pm. RN placed NRB on patient with improvement in O2 sats to > 92%. Seen and assessed at bedside, patient is without acute complaints, did not have any symptoms during desaturation & states he's starting to feel more like himself today. Denies lightheadedness, dyspnea, chest pain, palpitations. On exam, patient in NAD resting comfortably, NRB & hiflow in place. A&Ox3, follows all commands. Respirations nonlabored, chest rise equal bilaterally. Diffuse wheezing on auscultation. Speaking in full sentences, no accessory muscle use. Clear S1/S2, regular rate/rhythm.    Vital Signs Last 24 Hrs  T(C): 36.6 (02 May 2022 16:26), Max: 36.6 (02 May 2022 11:09)  T(F): 97.9 (02 May 2022 16:26), Max: 97.9 (02 May 2022 16:26)  HR: 63 (02 May 2022 16:33) (59 - 65)  BP: 101/63 (02 May 2022 16:26) (101/63 - 104/65)  RR: 18 (02 May 2022 16:35) (16 - 19)  SpO2: 93% (02 May 2022 16:35) (93% - 96%)    A/P   - RT called to increase O2 via high flow. PRN nebulizer to be administered for wheezing.   - Given asymptomatic, will continue to monitor at this time. Will reattempt to wean O2 tomorrow morning if patient tolerates updated high flow settings overnight.   - RN to continue to monitor, will alert provider with any change in patient status. Alerted by RN patient desaturating on Hiflow to ~84% with good pleth, was titrated down to 40 L/min // 60% O2 around 4 pm. RN placed NRB on patient with improvement in O2 sats to > 92%. Seen and assessed at bedside, patient is without acute complaints, did not have any symptoms during desaturation & states he's starting to feel more like himself today. Denies lightheadedness, dyspnea, chest pain, palpitations. On exam, patient in NAD resting comfortably, NRB & hiflow in place. A&Ox3, follows all commands. Respirations nonlabored, chest rise equal bilaterally. Diffuse wheezing on auscultation. Speaking in full sentences, no accessory muscle use. Clear S1/S2, regular rate/rhythm.    Vital Signs Last 24 Hrs  T(C): 36.6 (02 May 2022 16:26), Max: 36.6 (02 May 2022 11:09)  T(F): 97.9 (02 May 2022 16:26), Max: 97.9 (02 May 2022 16:26)  HR: 63 (02 May 2022 16:33) (59 - 65)  BP: 101/63 (02 May 2022 16:26) (101/63 - 104/65)  RR: 18 (02 May 2022 16:35) (16 - 19)  SpO2: 93% (02 May 2022 16:35) (93% - 96%)    A/P   - RT called to increase O2 via HFNC. PRN nebulizer to be administered for wheezing.   - Given asymptomatic, will continue to monitor at this time. Will reattempt to wean O2 tomorrow morning if patient tolerates updated HFNC settings overnight.   - RN to continue to monitor, will alert provider with any change in patient status.

## 2022-05-02 NOTE — PROGRESS NOTE ADULT - ASSESSMENT
76M with PMHX MI/CAD, HTN, HLD, CHFrEF, ICM s/p AICD presents to [facility] ER c/o SOB/CARL and Nonproductive Cough x1-2 days s/p failed outpt abx for PNA admitted for Acute Hypoxic Respiratory Failure 2/2 Viral PNA/Influenza and possible superimposed bacterial PNA    #Acute Hypoxic Respiratory Failure 2/2 multifocal viral PNA from Influenza A,   - worsening now on HFNC and PRN NRB  - PE was ruled out on CTA  - repeat CT chest in AM if HD stable  - ABG and repeat COVID/RVP  - weaning off oxygen as tolerated, goal spo> 89   - c/w Ozeltamevir and steroids IV q8hr as per Pulmonology team recommendations  - c/w Azithromycin d4/5 and broaden to Zosyn d2/7  - aggressive chest PT  - prone as tolerated  - supportive care with IS, mucinex, tylenol, duonebs    #Leukocytosis  - infectious with recent steroid use  - daily CBC  - management as above    #MI/CAD  #HTN/HLD  #Chronic CHFrEF  #ICM s/p AICD  -ASA 81mg q24 + Plavix 75mg PO q24 for DAPT  -Amiodarone 200mg q24 for VT  -Coreg 6.25mg PO BID   -Amlodipine 5mg PO q24  -decreased Entresto to 24/26 mg PO BID  -Atorvastatin 40mg PO q24  -dc Lasix 20mg PO, resume once BP more elevated    DVT ppx - lovenox  Diet - DASH/TLC  Dispo - acute    code/social - DNR/DNI, families updated at bedside

## 2022-05-02 NOTE — DIETITIAN INITIAL EVALUATION ADULT - ORAL INTAKE PTA/DIET HISTORY
Pt reports not following a diet at home, adds salt to foods often. Pts daughter at bedside reports 20lb weight loss since Jan 2022 2/2 diminished appetite, current weight 155lbs noted. Poor appetite/PO intake in house noted completing <50% of meals, taking Ensure well, encouraged protein intake and discussed importance of following a low Na diet 2/2 CHF.

## 2022-05-02 NOTE — DIETITIAN INITIAL EVALUATION ADULT - PERTINENT MEDS FT
MEDICATIONS  (STANDING):  albuterol/ipratropium for Nebulization 3 milliLiter(s) Nebulizer every 6 hours  aMIOdarone    Tablet 200 milliGRAM(s) Oral daily  amLODIPine   Tablet 5 milliGRAM(s) Oral daily  aspirin enteric coated 81 milliGRAM(s) Oral daily  atorvastatin 40 milliGRAM(s) Oral at bedtime  azithromycin  IVPB 500 milliGRAM(s) IV Intermittent every 24 hours  benzonatate 100 milliGRAM(s) Oral three times a day  carvedilol 6.25 milliGRAM(s) Oral every 12 hours  clopidogrel Tablet 75 milliGRAM(s) Oral daily  enoxaparin Injectable 40 milliGRAM(s) SubCutaneous every 24 hours  methylPREDNISolone sodium succinate Injectable 40 milliGRAM(s) IV Push every 8 hours  oseltamivir 75 milliGRAM(s) Oral two times a day  piperacillin/tazobactam IVPB.. 3.375 Gram(s) IV Intermittent every 8 hours  sacubitril 24 mG/valsartan 26 mG 1 Tablet(s) Oral two times a day    MEDICATIONS  (PRN):  acetaminophen     Tablet .. 650 milliGRAM(s) Oral every 6 hours PRN Temp greater or equal to 38C (100.4F), Mild Pain (1 - 3)  ALBUTerol    0.083% 2.5 milliGRAM(s) Nebulizer every 2 hours PRN Shortness of Breath and/or Wheezing  aluminum hydroxide/magnesium hydroxide/simethicone Suspension 30 milliLiter(s) Oral every 4 hours PRN Dyspepsia  guaifenesin/dextromethorphan Oral Liquid 10 milliLiter(s) Oral every 6 hours PRN Cough  melatonin 3 milliGRAM(s) Oral at bedtime PRN Insomnia  ondansetron Injectable 4 milliGRAM(s) IV Push every 8 hours PRN Nausea and/or Vomiting

## 2022-05-02 NOTE — DIETITIAN NUTRITION RISK NOTIFICATION - ADDITIONAL COMMENTS/DIETITIAN RECOMMENDATIONS
1) Rx MVI daily  2) Continue current diet with Ensure TID   3) Encourage HBV protein sources   4) Monitor weights daily for trend/accuracy   5) Obtain/provide food preferences daily to inc PO

## 2022-05-02 NOTE — DIETITIAN INITIAL EVALUATION ADULT - ETIOLOGY
related to inability to meet sufficient protein-energy needs in setting of acute resp failure w/hypoxia 2/2 flu

## 2022-05-02 NOTE — DIETITIAN INITIAL EVALUATION ADULT - NS FNS DIET ORDER
Diet, DASH/TLC:   Sodium & Cholesterol Restricted  Supplement Feeding Modality:  Oral  Ensure Enlive Cans or Servings Per Day:  1       Frequency:  Three Times a day (05-01-22 @ 11:26)

## 2022-05-02 NOTE — DIETITIAN INITIAL EVALUATION ADULT - OTHER INFO
76M with PMHX MI/CAD, HTN, HLD, CHFrEF, ICM s/p AICD presents to [facility] ER c/o SOB/CARL and Nonproductive Cough x1-2 days s/p failed outpt abx for PNA admitted for Acute Hypoxic Respiratory Failure. CXR +BL infiltrates, CTA Chest negative for PE but +B/L GGO. RVP positive Influenza AH3. Pt currently on NRB +HFNC.

## 2022-05-02 NOTE — PROGRESS NOTE ADULT - ASSESSMENT
Imp--Flu A with bilat inf, ongoing hypoxemia.  Plan--repeat CT chest  Continue abx, steroids.  Remains at risk for intubation.

## 2022-05-02 NOTE — DIETITIAN INITIAL EVALUATION ADULT - PERTINENT LABORATORY DATA
05-02    140  |  104  |  21.6<H>  ----------------------------<  201<H>  3.8   |  24.0  |  0.53    Ca    8.8      02 May 2022 06:14  Phos  2.8     05-02  Mg     2.4     05-02

## 2022-05-02 NOTE — DIETITIAN NUTRITION RISK NOTIFICATION - TREATMENT: THE FOLLOWING DIET HAS BEEN RECOMMENDED
Diet, DASH/TLC:   Sodium & Cholesterol Restricted  Supplement Feeding Modality:  Oral  Ensure Enlive Cans or Servings Per Day:  1       Frequency:  Three Times a day (05-01-22 @ 11:26) [Active]

## 2022-05-03 LAB
ANION GAP SERPL CALC-SCNC: 11 MMOL/L — SIGNIFICANT CHANGE UP (ref 5–17)
BUN SERPL-MCNC: 28.3 MG/DL — HIGH (ref 8–20)
CALCIUM SERPL-MCNC: 8.8 MG/DL — SIGNIFICANT CHANGE UP (ref 8.6–10.2)
CHLORIDE SERPL-SCNC: 101 MMOL/L — SIGNIFICANT CHANGE UP (ref 98–107)
CO2 SERPL-SCNC: 26 MMOL/L — SIGNIFICANT CHANGE UP (ref 22–29)
CREAT SERPL-MCNC: 0.7 MG/DL — SIGNIFICANT CHANGE UP (ref 0.5–1.3)
EGFR: 94 ML/MIN/1.73M2 — SIGNIFICANT CHANGE UP
GLUCOSE SERPL-MCNC: 164 MG/DL — HIGH (ref 70–99)
HCT VFR BLD CALC: 41.9 % — SIGNIFICANT CHANGE UP (ref 39–50)
HGB BLD-MCNC: 14.3 G/DL — SIGNIFICANT CHANGE UP (ref 13–17)
MAGNESIUM SERPL-MCNC: 2.3 MG/DL — SIGNIFICANT CHANGE UP (ref 1.8–2.6)
MCHC RBC-ENTMCNC: 34 PG — SIGNIFICANT CHANGE UP (ref 27–34)
MCHC RBC-ENTMCNC: 34.1 GM/DL — SIGNIFICANT CHANGE UP (ref 32–36)
MCV RBC AUTO: 99.5 FL — SIGNIFICANT CHANGE UP (ref 80–100)
PHOSPHATE SERPL-MCNC: 2.3 MG/DL — LOW (ref 2.4–4.7)
PLATELET # BLD AUTO: 335 K/UL — SIGNIFICANT CHANGE UP (ref 150–400)
POTASSIUM SERPL-MCNC: 4.3 MMOL/L — SIGNIFICANT CHANGE UP (ref 3.5–5.3)
POTASSIUM SERPL-SCNC: 4.3 MMOL/L — SIGNIFICANT CHANGE UP (ref 3.5–5.3)
RBC # BLD: 4.21 M/UL — SIGNIFICANT CHANGE UP (ref 4.2–5.8)
RBC # FLD: 14.1 % — SIGNIFICANT CHANGE UP (ref 10.3–14.5)
SODIUM SERPL-SCNC: 138 MMOL/L — SIGNIFICANT CHANGE UP (ref 135–145)
WBC # BLD: 21.76 K/UL — HIGH (ref 3.8–10.5)
WBC # FLD AUTO: 21.76 K/UL — HIGH (ref 3.8–10.5)

## 2022-05-03 PROCEDURE — 99232 SBSQ HOSP IP/OBS MODERATE 35: CPT

## 2022-05-03 PROCEDURE — 99233 SBSQ HOSP IP/OBS HIGH 50: CPT

## 2022-05-03 RX ORDER — SODIUM,POTASSIUM PHOSPHATES 278-250MG
1 POWDER IN PACKET (EA) ORAL ONCE
Refills: 0 | Status: COMPLETED | OUTPATIENT
Start: 2022-05-03 | End: 2022-05-03

## 2022-05-03 RX ADMIN — Medication 40 MILLIGRAM(S): at 09:55

## 2022-05-03 RX ADMIN — AMIODARONE HYDROCHLORIDE 200 MILLIGRAM(S): 400 TABLET ORAL at 05:52

## 2022-05-03 RX ADMIN — Medication 40 MILLIGRAM(S): at 17:39

## 2022-05-03 RX ADMIN — Medication 100 MILLIGRAM(S): at 05:53

## 2022-05-03 RX ADMIN — Medication 3 MILLILITER(S): at 21:15

## 2022-05-03 RX ADMIN — Medication 100 MILLIGRAM(S): at 13:32

## 2022-05-03 RX ADMIN — Medication 81 MILLIGRAM(S): at 12:11

## 2022-05-03 RX ADMIN — SACUBITRIL AND VALSARTAN 1 TABLET(S): 24; 26 TABLET, FILM COATED ORAL at 05:52

## 2022-05-03 RX ADMIN — ENOXAPARIN SODIUM 40 MILLIGRAM(S): 100 INJECTION SUBCUTANEOUS at 05:55

## 2022-05-03 RX ADMIN — PIPERACILLIN AND TAZOBACTAM 25 GRAM(S): 4; .5 INJECTION, POWDER, LYOPHILIZED, FOR SOLUTION INTRAVENOUS at 17:33

## 2022-05-03 RX ADMIN — Medication 5 MILLIGRAM(S): at 21:37

## 2022-05-03 RX ADMIN — Medication 3 MILLILITER(S): at 09:44

## 2022-05-03 RX ADMIN — ATORVASTATIN CALCIUM 40 MILLIGRAM(S): 80 TABLET, FILM COATED ORAL at 21:37

## 2022-05-03 RX ADMIN — CLOPIDOGREL BISULFATE 75 MILLIGRAM(S): 75 TABLET, FILM COATED ORAL at 12:11

## 2022-05-03 RX ADMIN — AMLODIPINE BESYLATE 5 MILLIGRAM(S): 2.5 TABLET ORAL at 05:52

## 2022-05-03 RX ADMIN — CARVEDILOL PHOSPHATE 6.25 MILLIGRAM(S): 80 CAPSULE, EXTENDED RELEASE ORAL at 05:52

## 2022-05-03 RX ADMIN — Medication 100 MILLIGRAM(S): at 21:37

## 2022-05-03 RX ADMIN — SACUBITRIL AND VALSARTAN 1 TABLET(S): 24; 26 TABLET, FILM COATED ORAL at 17:39

## 2022-05-03 RX ADMIN — Medication 3 MILLILITER(S): at 15:54

## 2022-05-03 RX ADMIN — Medication 3 MILLILITER(S): at 03:33

## 2022-05-03 RX ADMIN — Medication 1 PACKET(S): at 09:15

## 2022-05-03 RX ADMIN — PIPERACILLIN AND TAZOBACTAM 25 GRAM(S): 4; .5 INJECTION, POWDER, LYOPHILIZED, FOR SOLUTION INTRAVENOUS at 09:58

## 2022-05-03 RX ADMIN — ALBUTEROL 2.5 MILLIGRAM(S): 90 AEROSOL, METERED ORAL at 03:34

## 2022-05-03 RX ADMIN — Medication 40 MILLIGRAM(S): at 01:22

## 2022-05-03 RX ADMIN — AZITHROMYCIN 255 MILLIGRAM(S): 500 TABLET, FILM COATED ORAL at 16:28

## 2022-05-03 RX ADMIN — PIPERACILLIN AND TAZOBACTAM 25 GRAM(S): 4; .5 INJECTION, POWDER, LYOPHILIZED, FOR SOLUTION INTRAVENOUS at 01:22

## 2022-05-03 NOTE — PROGRESS NOTE ADULT - ASSESSMENT
76M with PMHX MI/CAD, HTN, HLD, CHFrEF, ICM s/p AICD presents to [facility] ER c/o SOB/CARL and Nonproductive Cough x1-2 days s/p failed outpt abx for PNA admitted for Acute Hypoxic Respiratory Failure 2/2 Viral PNA/Influenza and possible superimposed bacterial PNA    #Acute Hypoxic Respiratory Failure 2/2 multifocal viral PNA from Influenza A,   - remains on HFNC   - PE was ruled out on CTA  - repeat CT chest in AM if HD stable  - ABG and repeat COVID/RVP  - weaning off oxygen as tolerated, goal spo> 89   - c/w Ozeltamevir and steroids IV q8hr as per Pulmonology team recommendations  - c/w Azithromycin d5/5 and broaden to Zosyn d3/7  - aggressive chest PT  - prone as tolerated  - supportive care with IS, mucinex, tylenol, duonebs    #Leukocytosis  - infectious with recent steroid use  - daily CBC  - management as above    #MI/CAD  #HTN/HLD  #Chronic CHFrEF  #ICM s/p AICD  -ASA 81mg q24 + Plavix 75mg PO q24 for DAPT  -Amiodarone 200mg q24 for VT  -Coreg 6.25mg PO BID   -Amlodipine 5mg PO q24  -c/w Entresto to 24/26 mg PO BID  -Atorvastatin 40mg PO q24  -dc Lasix 20mg PO, resume once BP more elevated    DVT ppx - lovenox  Diet - DASH/TLC  Dispo - acute    code/social - DNR/DNI

## 2022-05-03 NOTE — PHYSICAL THERAPY INITIAL EVALUATION ADULT - ADDITIONAL COMMENTS
Pt lives in a house with 3 RENÉ with a HR and no steps inside. no DME. Pt states he has children who check on him and assist as needed.

## 2022-05-03 NOTE — PROGRESS NOTE ADULT - ASSESSMENT
Imp--Flu A with bilat inf, ongoing hypoxemia.  Possibly evolving into ILD  Plan--repeat Imaging as improves clinically   Continue abx, steroids.  Remains at risk for 02 dependence

## 2022-05-04 LAB
ANION GAP SERPL CALC-SCNC: 12 MMOL/L — SIGNIFICANT CHANGE UP (ref 5–17)
BASOPHILS # BLD AUTO: 0.05 K/UL — SIGNIFICANT CHANGE UP (ref 0–0.2)
BASOPHILS NFR BLD AUTO: 0.2 % — SIGNIFICANT CHANGE UP (ref 0–2)
BUN SERPL-MCNC: 29.9 MG/DL — HIGH (ref 8–20)
CALCIUM SERPL-MCNC: 9 MG/DL — SIGNIFICANT CHANGE UP (ref 8.6–10.2)
CHLORIDE SERPL-SCNC: 103 MMOL/L — SIGNIFICANT CHANGE UP (ref 98–107)
CO2 SERPL-SCNC: 26 MMOL/L — SIGNIFICANT CHANGE UP (ref 22–29)
CREAT SERPL-MCNC: 0.66 MG/DL — SIGNIFICANT CHANGE UP (ref 0.5–1.3)
EGFR: 96 ML/MIN/1.73M2 — SIGNIFICANT CHANGE UP
EOSINOPHIL # BLD AUTO: 0 K/UL — SIGNIFICANT CHANGE UP (ref 0–0.5)
EOSINOPHIL NFR BLD AUTO: 0 % — SIGNIFICANT CHANGE UP (ref 0–6)
GLUCOSE SERPL-MCNC: 172 MG/DL — HIGH (ref 70–99)
HCT VFR BLD CALC: 42.3 % — SIGNIFICANT CHANGE UP (ref 39–50)
HGB BLD-MCNC: 13.9 G/DL — SIGNIFICANT CHANGE UP (ref 13–17)
IMM GRANULOCYTES NFR BLD AUTO: 1.6 % — HIGH (ref 0–1.5)
LYMPHOCYTES # BLD AUTO: 0.52 K/UL — LOW (ref 1–3.3)
LYMPHOCYTES # BLD AUTO: 2.5 % — LOW (ref 13–44)
MAGNESIUM SERPL-MCNC: 2.3 MG/DL — SIGNIFICANT CHANGE UP (ref 1.8–2.6)
MCHC RBC-ENTMCNC: 32.9 GM/DL — SIGNIFICANT CHANGE UP (ref 32–36)
MCHC RBC-ENTMCNC: 32.9 PG — SIGNIFICANT CHANGE UP (ref 27–34)
MCV RBC AUTO: 100.2 FL — HIGH (ref 80–100)
MONOCYTES # BLD AUTO: 0.63 K/UL — SIGNIFICANT CHANGE UP (ref 0–0.9)
MONOCYTES NFR BLD AUTO: 3 % — SIGNIFICANT CHANGE UP (ref 2–14)
NEUTROPHILS # BLD AUTO: 19.3 K/UL — HIGH (ref 1.8–7.4)
NEUTROPHILS NFR BLD AUTO: 92.7 % — HIGH (ref 43–77)
PHOSPHATE SERPL-MCNC: 2.8 MG/DL — SIGNIFICANT CHANGE UP (ref 2.4–4.7)
PLATELET # BLD AUTO: 350 K/UL — SIGNIFICANT CHANGE UP (ref 150–400)
POTASSIUM SERPL-MCNC: 4.5 MMOL/L — SIGNIFICANT CHANGE UP (ref 3.5–5.3)
POTASSIUM SERPL-SCNC: 4.5 MMOL/L — SIGNIFICANT CHANGE UP (ref 3.5–5.3)
RBC # BLD: 4.22 M/UL — SIGNIFICANT CHANGE UP (ref 4.2–5.8)
RBC # FLD: 14.3 % — SIGNIFICANT CHANGE UP (ref 10.3–14.5)
SODIUM SERPL-SCNC: 141 MMOL/L — SIGNIFICANT CHANGE UP (ref 135–145)
WBC # BLD: 20.83 K/UL — HIGH (ref 3.8–10.5)
WBC # FLD AUTO: 20.83 K/UL — HIGH (ref 3.8–10.5)

## 2022-05-04 PROCEDURE — 99233 SBSQ HOSP IP/OBS HIGH 50: CPT

## 2022-05-04 PROCEDURE — 71250 CT THORAX DX C-: CPT | Mod: 26

## 2022-05-04 RX ADMIN — AMIODARONE HYDROCHLORIDE 200 MILLIGRAM(S): 400 TABLET ORAL at 05:17

## 2022-05-04 RX ADMIN — Medication 100 MILLIGRAM(S): at 12:41

## 2022-05-04 RX ADMIN — CARVEDILOL PHOSPHATE 6.25 MILLIGRAM(S): 80 CAPSULE, EXTENDED RELEASE ORAL at 05:17

## 2022-05-04 RX ADMIN — Medication 40 MILLIGRAM(S): at 17:07

## 2022-05-04 RX ADMIN — Medication 3 MILLILITER(S): at 08:54

## 2022-05-04 RX ADMIN — Medication 81 MILLIGRAM(S): at 12:42

## 2022-05-04 RX ADMIN — Medication 40 MILLIGRAM(S): at 11:03

## 2022-05-04 RX ADMIN — CLOPIDOGREL BISULFATE 75 MILLIGRAM(S): 75 TABLET, FILM COATED ORAL at 13:24

## 2022-05-04 RX ADMIN — Medication 3 MILLILITER(S): at 04:36

## 2022-05-04 RX ADMIN — Medication 100 MILLIGRAM(S): at 05:17

## 2022-05-04 RX ADMIN — PIPERACILLIN AND TAZOBACTAM 25 GRAM(S): 4; .5 INJECTION, POWDER, LYOPHILIZED, FOR SOLUTION INTRAVENOUS at 11:03

## 2022-05-04 RX ADMIN — PIPERACILLIN AND TAZOBACTAM 25 GRAM(S): 4; .5 INJECTION, POWDER, LYOPHILIZED, FOR SOLUTION INTRAVENOUS at 17:08

## 2022-05-04 RX ADMIN — Medication 40 MILLIGRAM(S): at 02:34

## 2022-05-04 RX ADMIN — Medication 5 MILLIGRAM(S): at 22:32

## 2022-05-04 RX ADMIN — SACUBITRIL AND VALSARTAN 1 TABLET(S): 24; 26 TABLET, FILM COATED ORAL at 17:06

## 2022-05-04 RX ADMIN — ENOXAPARIN SODIUM 40 MILLIGRAM(S): 100 INJECTION SUBCUTANEOUS at 05:16

## 2022-05-04 RX ADMIN — PIPERACILLIN AND TAZOBACTAM 25 GRAM(S): 4; .5 INJECTION, POWDER, LYOPHILIZED, FOR SOLUTION INTRAVENOUS at 02:34

## 2022-05-04 RX ADMIN — ATORVASTATIN CALCIUM 40 MILLIGRAM(S): 80 TABLET, FILM COATED ORAL at 22:31

## 2022-05-04 RX ADMIN — Medication 3 MILLILITER(S): at 21:19

## 2022-05-04 RX ADMIN — Medication 3 MILLILITER(S): at 16:05

## 2022-05-04 RX ADMIN — CARVEDILOL PHOSPHATE 6.25 MILLIGRAM(S): 80 CAPSULE, EXTENDED RELEASE ORAL at 17:07

## 2022-05-04 RX ADMIN — AMLODIPINE BESYLATE 5 MILLIGRAM(S): 2.5 TABLET ORAL at 05:17

## 2022-05-04 RX ADMIN — SACUBITRIL AND VALSARTAN 1 TABLET(S): 24; 26 TABLET, FILM COATED ORAL at 05:17

## 2022-05-04 RX ADMIN — Medication 100 MILLIGRAM(S): at 22:31

## 2022-05-04 NOTE — PROGRESS NOTE ADULT - ASSESSMENT
76M with PMHX MI/CAD, HTN, HLD, CHFrEF, ICM s/p AICD presents to [facility] ER c/o SOB/CARL and Nonproductive Cough x1-2 days s/p failed outpt abx for PNA admitted for Acute Hypoxic Respiratory Failure 2/2 Viral PNA/Influenza and possible superimposed bacterial PNA    #Acute Hypoxic Respiratory Failure 2/2 multifocal viral PNA from Influenza A  - remains on HFNC   - PE was ruled out on CTA  - repeat CT chest in AM if HD stable  - ABG and repeat COVID/RVP  - weaning off oxygen as tolerated, goal spo> 89   - c/w Ozeltamevir and steroids IV q8hr as per Pulmonology team recommendations  - s/p Azithromycin  - c/w Zosyn d4/7  - aggressive chest PT  - prone as tolerated  - supportive care with IS, mucinex, tylenol, duonebs    #Leukocytosis  - infectious with recent steroid use  - daily CBC  - management as above    #MI/CAD  #HTN/HLD  #Chronic CHFrEF  #ICM s/p AICD  -ASA 81mg q24 + Plavix 75mg PO q24 for DAPT  -Amiodarone 200mg q24 for VT  -Coreg 6.25mg PO BID   -Amlodipine 5mg PO q24  -c/w Entresto to 24/26 mg PO BID  -Atorvastatin 40mg PO q24  -dc Lasix 20mg PO, resume once BP more elevated    DVT ppx - lovenox  Diet - DASH/TLC  Dispo - acute    code/social - DNR/DNI

## 2022-05-04 NOTE — PROGRESS NOTE ADULT - ASSESSMENT
Imp--Influenza A with bilat inf, protracted hypoxemic resp failure.  Condition largely unchanged.  Plan--Continue O2 steroids, abx.

## 2022-05-05 LAB
ANION GAP SERPL CALC-SCNC: 11 MMOL/L — SIGNIFICANT CHANGE UP (ref 5–17)
BUN SERPL-MCNC: 31.3 MG/DL — HIGH (ref 8–20)
CALCIUM SERPL-MCNC: 9.2 MG/DL — SIGNIFICANT CHANGE UP (ref 8.6–10.2)
CHLORIDE SERPL-SCNC: 101 MMOL/L — SIGNIFICANT CHANGE UP (ref 98–107)
CO2 SERPL-SCNC: 27 MMOL/L — SIGNIFICANT CHANGE UP (ref 22–29)
CREAT SERPL-MCNC: 0.72 MG/DL — SIGNIFICANT CHANGE UP (ref 0.5–1.3)
EGFR: 94 ML/MIN/1.73M2 — SIGNIFICANT CHANGE UP
GLUCOSE SERPL-MCNC: 161 MG/DL — HIGH (ref 70–99)
HCT VFR BLD CALC: 44.9 % — SIGNIFICANT CHANGE UP (ref 39–50)
HGB BLD-MCNC: 14.9 G/DL — SIGNIFICANT CHANGE UP (ref 13–17)
MAGNESIUM SERPL-MCNC: 2.5 MG/DL — SIGNIFICANT CHANGE UP (ref 1.6–2.6)
MCHC RBC-ENTMCNC: 33.2 GM/DL — SIGNIFICANT CHANGE UP (ref 32–36)
MCHC RBC-ENTMCNC: 33.2 PG — SIGNIFICANT CHANGE UP (ref 27–34)
MCV RBC AUTO: 100 FL — SIGNIFICANT CHANGE UP (ref 80–100)
PHOSPHATE SERPL-MCNC: 2.7 MG/DL — SIGNIFICANT CHANGE UP (ref 2.4–4.7)
PLATELET # BLD AUTO: 388 K/UL — SIGNIFICANT CHANGE UP (ref 150–400)
POTASSIUM SERPL-MCNC: 4.6 MMOL/L — SIGNIFICANT CHANGE UP (ref 3.5–5.3)
POTASSIUM SERPL-SCNC: 4.6 MMOL/L — SIGNIFICANT CHANGE UP (ref 3.5–5.3)
RBC # BLD: 4.49 M/UL — SIGNIFICANT CHANGE UP (ref 4.2–5.8)
RBC # FLD: 14.1 % — SIGNIFICANT CHANGE UP (ref 10.3–14.5)
SODIUM SERPL-SCNC: 139 MMOL/L — SIGNIFICANT CHANGE UP (ref 135–145)
WBC # BLD: 21.3 K/UL — HIGH (ref 3.8–10.5)
WBC # FLD AUTO: 21.3 K/UL — HIGH (ref 3.8–10.5)

## 2022-05-05 PROCEDURE — 99233 SBSQ HOSP IP/OBS HIGH 50: CPT

## 2022-05-05 RX ADMIN — Medication 81 MILLIGRAM(S): at 12:30

## 2022-05-05 RX ADMIN — Medication 3 MILLILITER(S): at 15:12

## 2022-05-05 RX ADMIN — ATORVASTATIN CALCIUM 40 MILLIGRAM(S): 80 TABLET, FILM COATED ORAL at 22:49

## 2022-05-05 RX ADMIN — Medication 3 MILLILITER(S): at 09:20

## 2022-05-05 RX ADMIN — PIPERACILLIN AND TAZOBACTAM 25 GRAM(S): 4; .5 INJECTION, POWDER, LYOPHILIZED, FOR SOLUTION INTRAVENOUS at 02:27

## 2022-05-05 RX ADMIN — CARVEDILOL PHOSPHATE 6.25 MILLIGRAM(S): 80 CAPSULE, EXTENDED RELEASE ORAL at 17:22

## 2022-05-05 RX ADMIN — Medication 5 MILLIGRAM(S): at 22:49

## 2022-05-05 RX ADMIN — Medication 100 MILLIGRAM(S): at 22:49

## 2022-05-05 RX ADMIN — SACUBITRIL AND VALSARTAN 1 TABLET(S): 24; 26 TABLET, FILM COATED ORAL at 05:09

## 2022-05-05 RX ADMIN — ENOXAPARIN SODIUM 40 MILLIGRAM(S): 100 INJECTION SUBCUTANEOUS at 05:12

## 2022-05-05 RX ADMIN — Medication 40 MILLIGRAM(S): at 17:23

## 2022-05-05 RX ADMIN — SACUBITRIL AND VALSARTAN 1 TABLET(S): 24; 26 TABLET, FILM COATED ORAL at 17:22

## 2022-05-05 RX ADMIN — Medication 3 MILLILITER(S): at 20:17

## 2022-05-05 RX ADMIN — PIPERACILLIN AND TAZOBACTAM 25 GRAM(S): 4; .5 INJECTION, POWDER, LYOPHILIZED, FOR SOLUTION INTRAVENOUS at 10:48

## 2022-05-05 RX ADMIN — Medication 100 MILLIGRAM(S): at 05:09

## 2022-05-05 RX ADMIN — AMLODIPINE BESYLATE 5 MILLIGRAM(S): 2.5 TABLET ORAL at 05:10

## 2022-05-05 RX ADMIN — PIPERACILLIN AND TAZOBACTAM 25 GRAM(S): 4; .5 INJECTION, POWDER, LYOPHILIZED, FOR SOLUTION INTRAVENOUS at 17:23

## 2022-05-05 RX ADMIN — Medication 40 MILLIGRAM(S): at 10:47

## 2022-05-05 RX ADMIN — CARVEDILOL PHOSPHATE 6.25 MILLIGRAM(S): 80 CAPSULE, EXTENDED RELEASE ORAL at 05:11

## 2022-05-05 RX ADMIN — Medication 100 MILLIGRAM(S): at 12:30

## 2022-05-05 RX ADMIN — Medication 40 MILLIGRAM(S): at 02:27

## 2022-05-05 RX ADMIN — CLOPIDOGREL BISULFATE 75 MILLIGRAM(S): 75 TABLET, FILM COATED ORAL at 12:30

## 2022-05-05 RX ADMIN — AMIODARONE HYDROCHLORIDE 200 MILLIGRAM(S): 400 TABLET ORAL at 05:10

## 2022-05-05 NOTE — PROGRESS NOTE ADULT - ASSESSMENT
Imp  Influenza with pneumonia vs ARDS.  Clinically improving.  Plan--Maintain current abx, IV steroids.  Lower O2 as ophelia.

## 2022-05-05 NOTE — CHART NOTE - NSCHARTNOTEFT_GEN_A_CORE
Source: Patient [X]  Family [ ]   other [ ]    Current Diet:   Diet, DASH/TLC:   Sodium & Cholesterol Restricted  Supplement Feeding Modality:  Oral  Ensure Enlive Cans or Servings Per Day:  1       Frequency:  Three Times a day (05-01-22 @ 11:26)    Patient reports [ ] nausea  [ ] vomiting [ ] diarrhea [ ] constipation  [ ]chewing problems [ ] swallowing issues  [ ] other:     PO intake:  < 50% [ ]   50-75%  [ ]   %  [X ]  other :    Source for PO intake [X ] Patient [ ] family [ ] chart [ ] staff [ ] other    Current Weight:   (5/5) 165 lbs  (5/2) 155 lbs    % Weight Change: unable to determine; no edema documented    Pertinent Medications: MEDICATIONS  (STANDING):  albuterol/ipratropium for Nebulization 3 milliLiter(s) Nebulizer every 6 hours  aMIOdarone    Tablet 200 milliGRAM(s) Oral daily  amLODIPine   Tablet 5 milliGRAM(s) Oral daily  aspirin enteric coated 81 milliGRAM(s) Oral daily  atorvastatin 40 milliGRAM(s) Oral at bedtime  benzonatate 100 milliGRAM(s) Oral three times a day  carvedilol 6.25 milliGRAM(s) Oral every 12 hours  clopidogrel Tablet 75 milliGRAM(s) Oral daily  enoxaparin Injectable 40 milliGRAM(s) SubCutaneous every 24 hours  methylPREDNISolone sodium succinate Injectable 40 milliGRAM(s) IV Push every 8 hours  piperacillin/tazobactam IVPB.. 3.375 Gram(s) IV Intermittent every 8 hours  sacubitril 24 mG/valsartan 26 mG 1 Tablet(s) Oral two times a day    MEDICATIONS  (PRN):  acetaminophen     Tablet .. 650 milliGRAM(s) Oral every 6 hours PRN Temp greater or equal to 38C (100.4F), Mild Pain (1 - 3)  ALBUTerol    0.083% 2.5 milliGRAM(s) Nebulizer every 2 hours PRN Shortness of Breath and/or Wheezing  aluminum hydroxide/magnesium hydroxide/simethicone Suspension 30 milliLiter(s) Oral every 4 hours PRN Dyspepsia  guaifenesin/dextromethorphan Oral Liquid 10 milliLiter(s) Oral every 6 hours PRN Cough  melatonin 5 milliGRAM(s) Oral at bedtime PRN Insomnia  ondansetron Injectable 4 milliGRAM(s) IV Push every 8 hours PRN Nausea and/or Vomiting    Pertinent Labs: CBC Full  -  ( 05 May 2022 07:27 )  WBC Count : 21.30 K/uL  RBC Count : 4.49 M/uL  Hemoglobin : 14.9 g/dL  Hematocrit : 44.9 %  Platelet Count - Automated : 388 K/uL  Mean Cell Volume : 100.0 fl  Mean Cell Hemoglobin : 33.2 pg  Mean Cell Hemoglobin Concentration : 33.2 gm/dL  Auto Neutrophil # : x  Auto Lymphocyte # : x  Auto Monocyte # : x  Auto Eosinophil # : x  Auto Basophil # : x  Auto Neutrophil % : x  Auto Lymphocyte % : x  Auto Monocyte % : x  Auto Eosinophil % : x  Auto Basophil % : x    05-05 Na139 mmol/L Glu 161 mg/dL<H> K+ 4.6 mmol/L Cr  0.72 mg/dL BUN 31.3 mg/dL<H> Phos 2.7 mg/dL Alb n/a   PAB n/a       Skin: no skin breakdown documented    Nutrition focused physical exam conducted - found signs of malnutrition [ ]absent [X ]present    Subcutaneous fat loss: [ X] Orbital fat pads region, [X ]Buccal fat region, [ ]Triceps region,  [ ]Ribs region    Muscle wasting: [X ]Temples region, [X ]Clavicle region, [ ]Shoulder region, [ ]Scapula region, [ ]Interosseous region,  [ ]thigh region, [ ]Calf region    Estimated Needs:   [X ] no change since previous assessment  [ ] recalculated:     Current Nutrition Diagnosis:  Pt remains at high nutrition risk secondary to severe chronic malnutrition related to inability to meet sufficient protein-energy needs in setting of acute resp failure w/hypoxia 2/2 flu  as evidenced by meeting <75% EER >1 mo, 20lb (11%) wt loss x 4 mo, mod muscle/fat loss.  F/u with pt reporting improved appetite & PO intake >75% over the last 3 days. Observed breakfast tray demonstrating >75% PO intake. Pt states he is consuming his ensure enlive TID. Wt appears to look relatively stable, unable to determine if there is possible wt gain due to improvement in PO intake/appetite as bed scale may be inconsistent. Will continue to monitor.    Recommendations:   Rx MVI daily; Encourage HBV protein sources    Monitoring and Evaluation:   [ X] PO intake [ ] Tolerance to diet prescription [X] Weights  [X] Follow up per protocol [X] Labs:

## 2022-05-05 NOTE — PROGRESS NOTE ADULT - ASSESSMENT
76M with PMHX MI/CAD, HTN, HLD, CHFrEF, ICM s/p AICD presents to [facility] ER c/o SOB/CARL and Nonproductive Cough x1-2 days s/p failed outpt abx for PNA admitted for Acute Hypoxic Respiratory Failure 2/2 Viral PNA/Influenza and possible superimposed bacterial PNA    #Acute Hypoxic Respiratory Failure 2/2 multifocal viral PNA from Influenza A  - titrating off HFNC - slow to improve - at times on 6 LPM will drop to 85% this morning  - PE was ruled out on CTA  - CT with improved infiltrates  - ABG and repeat COVID/RVP  - weaning off oxygen as tolerated, goal spo> 89   - c/w Ozeltamevir and steroids IV q8hr as per Pulmonology team recommendations  - continue steroids IV for now  - s/p Azithromycin  - c/w Zosyn d4/7  - aggressive chest PT  - prone as tolerated  - supportive care with IS, mucinex, tylenol, duonebs    #Leukocytosis  - infectious with recent steroid use  - daily CBC  - management as above    #MI/CAD  #HTN/HLD  #Chronic CHFrEF  #ICM s/p AICD  - ASA 81mg q24 + Plavix 75mg PO q24 for DAPT  - Amiodarone 200mg q24 for VT  - Coreg 6.25mg PO BID   - Amlodipine 5mg PO q24  - c/w Entresto to 24/26 mg PO BID  - Atorvastatin 40mg PO q24  - dc Lasix 20mg PO, resume once BP more elevated    DVT ppx - lovenox  Diet - DASH/TLC  Dispo - acute    code/social - DNR/DNI

## 2022-05-06 PROCEDURE — 99233 SBSQ HOSP IP/OBS HIGH 50: CPT

## 2022-05-06 PROCEDURE — 99232 SBSQ HOSP IP/OBS MODERATE 35: CPT

## 2022-05-06 RX ORDER — FUROSEMIDE 40 MG
20 TABLET ORAL DAILY
Refills: 0 | Status: DISCONTINUED | OUTPATIENT
Start: 2022-05-06 | End: 2022-05-18

## 2022-05-06 RX ADMIN — Medication 40 MILLIGRAM(S): at 02:21

## 2022-05-06 RX ADMIN — AMLODIPINE BESYLATE 5 MILLIGRAM(S): 2.5 TABLET ORAL at 05:54

## 2022-05-06 RX ADMIN — Medication 3 MILLILITER(S): at 14:37

## 2022-05-06 RX ADMIN — Medication 3 MILLILITER(S): at 22:28

## 2022-05-06 RX ADMIN — ATORVASTATIN CALCIUM 40 MILLIGRAM(S): 80 TABLET, FILM COATED ORAL at 22:13

## 2022-05-06 RX ADMIN — Medication 100 MILLIGRAM(S): at 16:53

## 2022-05-06 RX ADMIN — Medication 40 MILLIGRAM(S): at 16:54

## 2022-05-06 RX ADMIN — SACUBITRIL AND VALSARTAN 1 TABLET(S): 24; 26 TABLET, FILM COATED ORAL at 05:54

## 2022-05-06 RX ADMIN — Medication 81 MILLIGRAM(S): at 11:59

## 2022-05-06 RX ADMIN — PIPERACILLIN AND TAZOBACTAM 25 GRAM(S): 4; .5 INJECTION, POWDER, LYOPHILIZED, FOR SOLUTION INTRAVENOUS at 02:21

## 2022-05-06 RX ADMIN — SACUBITRIL AND VALSARTAN 1 TABLET(S): 24; 26 TABLET, FILM COATED ORAL at 16:52

## 2022-05-06 RX ADMIN — CARVEDILOL PHOSPHATE 6.25 MILLIGRAM(S): 80 CAPSULE, EXTENDED RELEASE ORAL at 16:52

## 2022-05-06 RX ADMIN — Medication 100 MILLIGRAM(S): at 22:13

## 2022-05-06 RX ADMIN — PIPERACILLIN AND TAZOBACTAM 25 GRAM(S): 4; .5 INJECTION, POWDER, LYOPHILIZED, FOR SOLUTION INTRAVENOUS at 16:54

## 2022-05-06 RX ADMIN — Medication 5 MILLIGRAM(S): at 22:12

## 2022-05-06 RX ADMIN — Medication 100 MILLIGRAM(S): at 05:58

## 2022-05-06 RX ADMIN — Medication 40 MILLIGRAM(S): at 11:58

## 2022-05-06 RX ADMIN — ENOXAPARIN SODIUM 40 MILLIGRAM(S): 100 INJECTION SUBCUTANEOUS at 05:53

## 2022-05-06 RX ADMIN — PIPERACILLIN AND TAZOBACTAM 25 GRAM(S): 4; .5 INJECTION, POWDER, LYOPHILIZED, FOR SOLUTION INTRAVENOUS at 11:58

## 2022-05-06 RX ADMIN — AMIODARONE HYDROCHLORIDE 200 MILLIGRAM(S): 400 TABLET ORAL at 05:53

## 2022-05-06 RX ADMIN — Medication 3 MILLILITER(S): at 08:36

## 2022-05-06 RX ADMIN — CLOPIDOGREL BISULFATE 75 MILLIGRAM(S): 75 TABLET, FILM COATED ORAL at 11:59

## 2022-05-06 RX ADMIN — Medication 10 MILLILITER(S): at 22:22

## 2022-05-06 RX ADMIN — CARVEDILOL PHOSPHATE 6.25 MILLIGRAM(S): 80 CAPSULE, EXTENDED RELEASE ORAL at 05:54

## 2022-05-06 NOTE — PROGRESS NOTE ADULT - NS ATTEST RISK PROBLEM GEN_ALL_CORE FT
hypoxemic resp failure
acute respiratory failure on HFNC
acute respiratory failure on HFNC and NRB needs
hypoxemic resp failure
acute respiratory failure on HFNC and NRB
acute respiratory failure on HFNC and NRB needs
acute respiratory failure on HFNC
hypoxemic resp failure.
Acute hypoxemic resp failure, Risk of decompensation.
hypoxemic resp failure
severe hypoxemic resp failure.

## 2022-05-06 NOTE — PROGRESS NOTE ADULT - ASSESSMENT
Imp  Influenza with pneumonia vs ARDS.  Clinically and radiographically improving.  Plan--Maintain current abx   IV steroids could be decreased to q 12h  Lower O2 as ophelia.

## 2022-05-06 NOTE — PROGRESS NOTE ADULT - NS ATTEST RISK GEN_ALL_CORE
Risk Statement (NON-critical care)

## 2022-05-06 NOTE — PROGRESS NOTE ADULT - ASSESSMENT
76M with PMHX MI/CAD, HTN, HLD, CHFrEF, ICM s/p AICD presents to [facility] ER c/o SOB/CARL and Nonproductive Cough x1-2 days s/p failed outpt abx for PNA admitted for Acute Hypoxic Respiratory Failure 2/2 Viral PNA/Influenza and possible superimposed bacterial PNA    #Acute Hypoxic Respiratory Failure 2/2 multifocal viral PNA from Influenza A  - titrating off HFNC - slow to improve - at times on 5 LPM will drop to 85%  - PE was ruled out on CTA  - CT with improved infiltrates  - weaning off oxygen as tolerated, goal spo> 89   - c/w Ozeltamevir and steroids IV q12hr as per Pulmonology team recommendations  - continue steroids IV for now  - s/p Azithromycin  - c/w Zosyn d4/7  - aggressive chest PT  - prone as tolerated  - supportive care with IS, mucinex, tylenol, duonebs    #Leukocytosis  - infectious with recent steroid use  - daily CBC  - management as above    #MI/CAD  #HTN/HLD  #Chronic CHFrEF  #ICM s/p AICD  - ASA 81mg q24 + Plavix 75mg PO q24 for DAPT  - Amiodarone 200mg q24 for VT  - Coreg 6.25mg PO BID   - Amlodipine 5mg PO q24  - c/w Entresto to 24/26 mg PO BID  - Atorvastatin 40mg PO q24  - Lasix 20mg PO, resume    DVT ppx - lovenox  Diet - DASH/TLC  Dispo - acute    code/social - DNR/DNI

## 2022-05-07 PROCEDURE — 99233 SBSQ HOSP IP/OBS HIGH 50: CPT

## 2022-05-07 PROCEDURE — 99231 SBSQ HOSP IP/OBS SF/LOW 25: CPT

## 2022-05-07 RX ADMIN — ATORVASTATIN CALCIUM 40 MILLIGRAM(S): 80 TABLET, FILM COATED ORAL at 21:40

## 2022-05-07 RX ADMIN — Medication 81 MILLIGRAM(S): at 11:10

## 2022-05-07 RX ADMIN — CARVEDILOL PHOSPHATE 6.25 MILLIGRAM(S): 80 CAPSULE, EXTENDED RELEASE ORAL at 05:22

## 2022-05-07 RX ADMIN — Medication 3 MILLILITER(S): at 13:26

## 2022-05-07 RX ADMIN — Medication 3 MILLILITER(S): at 21:06

## 2022-05-07 RX ADMIN — SACUBITRIL AND VALSARTAN 1 TABLET(S): 24; 26 TABLET, FILM COATED ORAL at 05:22

## 2022-05-07 RX ADMIN — CLOPIDOGREL BISULFATE 75 MILLIGRAM(S): 75 TABLET, FILM COATED ORAL at 11:11

## 2022-05-07 RX ADMIN — Medication 100 MILLIGRAM(S): at 05:23

## 2022-05-07 RX ADMIN — Medication 100 MILLIGRAM(S): at 21:40

## 2022-05-07 RX ADMIN — PIPERACILLIN AND TAZOBACTAM 25 GRAM(S): 4; .5 INJECTION, POWDER, LYOPHILIZED, FOR SOLUTION INTRAVENOUS at 11:11

## 2022-05-07 RX ADMIN — Medication 20 MILLIGRAM(S): at 05:23

## 2022-05-07 RX ADMIN — PIPERACILLIN AND TAZOBACTAM 25 GRAM(S): 4; .5 INJECTION, POWDER, LYOPHILIZED, FOR SOLUTION INTRAVENOUS at 01:31

## 2022-05-07 RX ADMIN — Medication 40 MILLIGRAM(S): at 17:40

## 2022-05-07 RX ADMIN — Medication 40 MILLIGRAM(S): at 05:22

## 2022-05-07 RX ADMIN — PIPERACILLIN AND TAZOBACTAM 25 GRAM(S): 4; .5 INJECTION, POWDER, LYOPHILIZED, FOR SOLUTION INTRAVENOUS at 18:43

## 2022-05-07 RX ADMIN — Medication 3 MILLILITER(S): at 09:08

## 2022-05-07 RX ADMIN — CARVEDILOL PHOSPHATE 6.25 MILLIGRAM(S): 80 CAPSULE, EXTENDED RELEASE ORAL at 17:40

## 2022-05-07 RX ADMIN — Medication 100 MILLIGRAM(S): at 13:50

## 2022-05-07 RX ADMIN — AMIODARONE HYDROCHLORIDE 200 MILLIGRAM(S): 400 TABLET ORAL at 05:22

## 2022-05-07 RX ADMIN — ENOXAPARIN SODIUM 40 MILLIGRAM(S): 100 INJECTION SUBCUTANEOUS at 05:22

## 2022-05-07 RX ADMIN — Medication 650 MILLIGRAM(S): at 11:11

## 2022-05-07 RX ADMIN — AMLODIPINE BESYLATE 5 MILLIGRAM(S): 2.5 TABLET ORAL at 05:22

## 2022-05-07 RX ADMIN — Medication 5 MILLIGRAM(S): at 21:40

## 2022-05-07 RX ADMIN — SACUBITRIL AND VALSARTAN 1 TABLET(S): 24; 26 TABLET, FILM COATED ORAL at 17:40

## 2022-05-07 NOTE — PROGRESS NOTE ADULT - TIME BILLING
Review of notes, orders, labs and images on all accessible EHR platforms  Bedside evaluation  Coordination with all active services, nursing and respiratory

## 2022-05-07 NOTE — PROGRESS NOTE ADULT - ASSESSMENT
76M with PMHX MI/CAD, HTN, HLD, CHFrEF, ICM s/p AICD presents to [facility] ER c/o SOB/CARL and Nonproductive Cough x1-2 days s/p failed outpt abx for PNA admitted for Acute Hypoxic Respiratory Failure 2/2 Viral PNA/Influenza and possible superimposed bacterial PNA    #Acute Hypoxic Respiratory Failure 2/2 multifocal viral PNA from Influenza A  - titrating off HFNC - slow to improve - at times on 5 LPM will drop to 85%  - PE was ruled out on CTA  - CT with improved infiltrates  - weaning off oxygen as tolerated, goal spo> 89   - c/w Ozeltamevir and steroids IV q12hr as per Pulmonology team recommendations  - continue steroids IV for now  - s/p Azithromycin  - c/w Zosyn d4/7  - aggressive chest PT  - prone as tolerated  - supportive care with IS, mucinex, tylenol, duonebs    #Leukocytosis  - infectious with recent steroid use  - daily CBC  - management as above    #MI/CAD  #HTN/HLD  #Chronic CHFrEF  #ICM s/p AICD  - ASA + Plavix   - Amiodarone,  Coreg , Entresto  - Amlodipine,  Atorvastatin, - Lasix 20mg    DVT ppx - lovenox  Diet - DASH/TLC  Dispo - acute    code/social - DNR/DNI

## 2022-05-07 NOTE — PROGRESS NOTE ADULT - ASSESSMENT
Imp  Influenza with pneumonia vs ARDS.  Clinically and radiographically improving.  Plan--Maintain current abx   IV steroids to prednisone soon   Lower O2 as ophelia.

## 2022-05-08 PROCEDURE — 99232 SBSQ HOSP IP/OBS MODERATE 35: CPT

## 2022-05-08 RX ORDER — SENNA PLUS 8.6 MG/1
2 TABLET ORAL AT BEDTIME
Refills: 0 | Status: DISCONTINUED | OUTPATIENT
Start: 2022-05-08 | End: 2022-05-18

## 2022-05-08 RX ADMIN — Medication 5 MILLIGRAM(S): at 21:18

## 2022-05-08 RX ADMIN — SENNA PLUS 2 TABLET(S): 8.6 TABLET ORAL at 21:18

## 2022-05-08 RX ADMIN — Medication 3 MILLILITER(S): at 15:18

## 2022-05-08 RX ADMIN — SACUBITRIL AND VALSARTAN 1 TABLET(S): 24; 26 TABLET, FILM COATED ORAL at 06:17

## 2022-05-08 RX ADMIN — AMIODARONE HYDROCHLORIDE 200 MILLIGRAM(S): 400 TABLET ORAL at 06:17

## 2022-05-08 RX ADMIN — Medication 40 MILLIGRAM(S): at 18:21

## 2022-05-08 RX ADMIN — ENOXAPARIN SODIUM 40 MILLIGRAM(S): 100 INJECTION SUBCUTANEOUS at 06:16

## 2022-05-08 RX ADMIN — SACUBITRIL AND VALSARTAN 1 TABLET(S): 24; 26 TABLET, FILM COATED ORAL at 18:22

## 2022-05-08 RX ADMIN — CARVEDILOL PHOSPHATE 6.25 MILLIGRAM(S): 80 CAPSULE, EXTENDED RELEASE ORAL at 18:21

## 2022-05-08 RX ADMIN — Medication 3 MILLILITER(S): at 04:14

## 2022-05-08 RX ADMIN — Medication 40 MILLIGRAM(S): at 06:16

## 2022-05-08 RX ADMIN — Medication 3 MILLILITER(S): at 21:14

## 2022-05-08 RX ADMIN — Medication 20 MILLIGRAM(S): at 06:17

## 2022-05-08 RX ADMIN — CLOPIDOGREL BISULFATE 75 MILLIGRAM(S): 75 TABLET, FILM COATED ORAL at 12:01

## 2022-05-08 RX ADMIN — Medication 100 MILLIGRAM(S): at 21:19

## 2022-05-08 RX ADMIN — Medication 100 MILLIGRAM(S): at 06:17

## 2022-05-08 RX ADMIN — Medication 81 MILLIGRAM(S): at 12:01

## 2022-05-08 RX ADMIN — ATORVASTATIN CALCIUM 40 MILLIGRAM(S): 80 TABLET, FILM COATED ORAL at 21:19

## 2022-05-08 RX ADMIN — AMLODIPINE BESYLATE 5 MILLIGRAM(S): 2.5 TABLET ORAL at 06:17

## 2022-05-08 RX ADMIN — CARVEDILOL PHOSPHATE 6.25 MILLIGRAM(S): 80 CAPSULE, EXTENDED RELEASE ORAL at 06:17

## 2022-05-08 RX ADMIN — Medication 100 MILLIGRAM(S): at 12:02

## 2022-05-08 RX ADMIN — PIPERACILLIN AND TAZOBACTAM 25 GRAM(S): 4; .5 INJECTION, POWDER, LYOPHILIZED, FOR SOLUTION INTRAVENOUS at 04:13

## 2022-05-08 RX ADMIN — Medication 3 MILLILITER(S): at 09:24

## 2022-05-08 NOTE — PROGRESS NOTE ADULT - ASSESSMENT
76M with PMHX MI/CAD, HTN, HLD, CHFrEF, ICM s/p AICD presents to [facility] ER c/o SOB/CARL and Nonproductive Cough x1-2 days s/p failed outpt abx for PNA admitted for Acute Hypoxic Respiratory Failure 2/2 Viral PNA/Influenza and possible superimposed bacterial PNA    #Acute Hypoxic Respiratory Failure 2/2 multifocal viral PNA from Influenza A  - titrating off HFNC - slow to improve - at times on 5-6 LPM will drop to 85%  - PE was ruled out on CTA  - CT with improved infiltrates  - c/w  steroids IV q12hr as per Pulmonology team recommendations  - completed antiviral and  Zosyn 7days   - prone as tolerated  - supportive care with IS, mucinex, tylenol, duonebs, Chest PT    #Leukocytosis  - infectious with recent steroid use  -repeat  CBC  - management as above    #MI/CAD  #HTN/HLD  #Chronic CHFrEF  #ICM s/p AICD  - ASA + Plavix   - Amiodarone,  Coreg , Entresto  - Amlodipine,  Atorvastatin, - Lasix 20mg    DVT ppx - lovenox  Diet - DASH/TLC  Dispo - acute - home with assist once weaned off O2. May need home O2 for prolonged wean and underlying cardiac issues in next 1-2 days     code/social - DNR/DNI

## 2022-05-09 ENCOUNTER — TRANSCRIPTION ENCOUNTER (OUTPATIENT)
Age: 77
End: 2022-05-09

## 2022-05-09 LAB
ANION GAP SERPL CALC-SCNC: 10 MMOL/L — SIGNIFICANT CHANGE UP (ref 5–17)
BUN SERPL-MCNC: 33.2 MG/DL — HIGH (ref 8–20)
CALCIUM SERPL-MCNC: 8.8 MG/DL — SIGNIFICANT CHANGE UP (ref 8.6–10.2)
CHLORIDE SERPL-SCNC: 105 MMOL/L — SIGNIFICANT CHANGE UP (ref 98–107)
CO2 SERPL-SCNC: 23 MMOL/L — SIGNIFICANT CHANGE UP (ref 22–29)
CREAT SERPL-MCNC: 0.67 MG/DL — SIGNIFICANT CHANGE UP (ref 0.5–1.3)
EGFR: 96 ML/MIN/1.73M2 — SIGNIFICANT CHANGE UP
GLUCOSE SERPL-MCNC: 144 MG/DL — HIGH (ref 70–99)
HCT VFR BLD CALC: 44.5 % — SIGNIFICANT CHANGE UP (ref 39–50)
HGB BLD-MCNC: 15 G/DL — SIGNIFICANT CHANGE UP (ref 13–17)
MCHC RBC-ENTMCNC: 33.5 PG — SIGNIFICANT CHANGE UP (ref 27–34)
MCHC RBC-ENTMCNC: 33.7 GM/DL — SIGNIFICANT CHANGE UP (ref 32–36)
MCV RBC AUTO: 99.3 FL — SIGNIFICANT CHANGE UP (ref 80–100)
PLATELET # BLD AUTO: 318 K/UL — SIGNIFICANT CHANGE UP (ref 150–400)
POTASSIUM SERPL-MCNC: 5 MMOL/L — SIGNIFICANT CHANGE UP (ref 3.5–5.3)
POTASSIUM SERPL-SCNC: 5 MMOL/L — SIGNIFICANT CHANGE UP (ref 3.5–5.3)
RBC # BLD: 4.48 M/UL — SIGNIFICANT CHANGE UP (ref 4.2–5.8)
RBC # FLD: 14.2 % — SIGNIFICANT CHANGE UP (ref 10.3–14.5)
SODIUM SERPL-SCNC: 138 MMOL/L — SIGNIFICANT CHANGE UP (ref 135–145)
WBC # BLD: 20.19 K/UL — HIGH (ref 3.8–10.5)
WBC # FLD AUTO: 20.19 K/UL — HIGH (ref 3.8–10.5)

## 2022-05-09 PROCEDURE — 99232 SBSQ HOSP IP/OBS MODERATE 35: CPT

## 2022-05-09 RX ADMIN — Medication 81 MILLIGRAM(S): at 12:23

## 2022-05-09 RX ADMIN — AMLODIPINE BESYLATE 5 MILLIGRAM(S): 2.5 TABLET ORAL at 05:56

## 2022-05-09 RX ADMIN — AMIODARONE HYDROCHLORIDE 200 MILLIGRAM(S): 400 TABLET ORAL at 05:56

## 2022-05-09 RX ADMIN — SACUBITRIL AND VALSARTAN 1 TABLET(S): 24; 26 TABLET, FILM COATED ORAL at 17:14

## 2022-05-09 RX ADMIN — ENOXAPARIN SODIUM 40 MILLIGRAM(S): 100 INJECTION SUBCUTANEOUS at 05:57

## 2022-05-09 RX ADMIN — Medication 100 MILLIGRAM(S): at 12:22

## 2022-05-09 RX ADMIN — CARVEDILOL PHOSPHATE 6.25 MILLIGRAM(S): 80 CAPSULE, EXTENDED RELEASE ORAL at 17:15

## 2022-05-09 RX ADMIN — Medication 100 MILLIGRAM(S): at 22:40

## 2022-05-09 RX ADMIN — Medication 40 MILLIGRAM(S): at 05:57

## 2022-05-09 RX ADMIN — SENNA PLUS 2 TABLET(S): 8.6 TABLET ORAL at 22:40

## 2022-05-09 RX ADMIN — SACUBITRIL AND VALSARTAN 1 TABLET(S): 24; 26 TABLET, FILM COATED ORAL at 05:56

## 2022-05-09 RX ADMIN — Medication 3 MILLILITER(S): at 09:19

## 2022-05-09 RX ADMIN — CLOPIDOGREL BISULFATE 75 MILLIGRAM(S): 75 TABLET, FILM COATED ORAL at 12:23

## 2022-05-09 RX ADMIN — Medication 100 MILLIGRAM(S): at 05:56

## 2022-05-09 RX ADMIN — Medication 3 MILLILITER(S): at 04:21

## 2022-05-09 RX ADMIN — Medication 5 MILLIGRAM(S): at 22:40

## 2022-05-09 RX ADMIN — CARVEDILOL PHOSPHATE 6.25 MILLIGRAM(S): 80 CAPSULE, EXTENDED RELEASE ORAL at 05:56

## 2022-05-09 RX ADMIN — Medication 20 MILLIGRAM(S): at 05:56

## 2022-05-09 RX ADMIN — Medication 3 MILLILITER(S): at 15:58

## 2022-05-09 RX ADMIN — ATORVASTATIN CALCIUM 40 MILLIGRAM(S): 80 TABLET, FILM COATED ORAL at 22:39

## 2022-05-09 RX ADMIN — Medication 3 MILLILITER(S): at 20:25

## 2022-05-09 RX ADMIN — Medication 20 MILLIGRAM(S): at 17:14

## 2022-05-09 NOTE — DISCHARGE NOTE PROVIDER - NSDCFUADDAPPT_GEN_ALL_CORE_FT
Please follow up with your primary care physician within 1 week.  Please follow up with your primary care physician within 1 week to repeat basic blood works (CBC and BMP).   Please follow up with your primary care physician within 1 week to repeat basic blood works (CBC and BMP).    Follow up with GI for eventual endoscopy outpatient

## 2022-05-09 NOTE — DISCHARGE NOTE PROVIDER - NSDCCPCAREPLAN_GEN_ALL_CORE_FT
PRINCIPAL DISCHARGE DIAGNOSIS  Diagnosis: Influenza A  Assessment and Plan of Treatment: Completed Tamiflu. Continue use of continuous oxygen at home for CHF      SECONDARY DISCHARGE DIAGNOSES  Diagnosis: Acute respiratory failure with hypoxia  Assessment and Plan of Treatment: Completed Tamiflu and IV antibiotics. Titrated down to 4L nasal cannula. No blood clot in lung on cat scan. Completed IV antibiotics. Seen by the Pulmonology team, stated IV steroids. Please continue oral steroid taper after discharge. Continue use of continuous oxygen at home    Diagnosis: CAD (coronary artery disease)  Assessment and Plan of Treatment: Continue medications as prescribed. Please follow up with your primary care physician and cardiologist within 1 week.    Diagnosis: Chronic CHF  Assessment and Plan of Treatment: Continue medications as prescribed. Please follow up with your primary care physician and cardiologist within 1 week.    Diagnosis: CHF with cardiomyopathy  Assessment and Plan of Treatment: Continue medications as prescribed. Please follow up with your primary care physician and cardiologist within 1 week.    Diagnosis: HLD (hyperlipidemia)  Assessment and Plan of Treatment: Continue medications as prescribed. Please follow up with your primary care physician and cardiologist within 1 week.     PRINCIPAL DISCHARGE DIAGNOSIS  Diagnosis: Influenza A  Assessment and Plan of Treatment: Completed Tamiflu.   Supplemental oxygen no longer required.  Annual Flu shot advised      SECONDARY DISCHARGE DIAGNOSES  Diagnosis: Acute respiratory failure with hypoxia  Assessment and Plan of Treatment: Completed Tamiflu and IV antibiotics. Successfully weaned of supplemental oxygen. No blood clot in lung on cat scan. Completed IV antibiotics. Seen by the Pulmonology team, stated IV steroids. Please continue oral steroid taper after discharge.    Diagnosis: CAD (coronary artery disease)  Assessment and Plan of Treatment: Continue medications as prescribed. Please follow up with your primary care physician and cardiologist within 1 week.    Diagnosis: Chronic CHF  Assessment and Plan of Treatment: Continue medications as prescribed. Please follow up with your primary care physician and cardiologist within 1 week.    Diagnosis: CHF with cardiomyopathy  Assessment and Plan of Treatment: Continue medications as prescribed. Please follow up with your primary care physician and cardiologist within 1 week.    Diagnosis: HLD (hyperlipidemia)  Assessment and Plan of Treatment: Continue medications as prescribed. Please follow up with your primary care physician and cardiologist within 1 week.    Diagnosis: Thrush  Assessment and Plan of Treatment: You had difficulty swallowing which was due to a fungal infection, oral candidiasis, commonly referred to as thrush. Please continue taking the antifungal medication fluconazole as prescribed and follow up with your primary care provider in one week for repeat blood work.

## 2022-05-09 NOTE — DISCHARGE NOTE PROVIDER - HOSPITAL COURSE
Pt is a 76M with PMHX MI/CAD, HTN, HLD, CHFrEF, ICM s/p AICD presents to [facility] ER c/o SOB/CARL and Nonproductive Cough x1-2 days s/p failed outpt abx for PNA admitted for Acute Hypoxic Respiratory Failure 2/2 Viral PNA/Influenza A and possible superimposed bacterial PNA. Initially needed HFNC. Titrated off HFNC and on 4L NC. PE was ruled out on CTA. CT with improved infiltrates. Seen by the Pulmonology team, stated steroids IV q12hr. Improved with supportive care with IS, mucinex, tylenol, duonebs, Chest PT. Completed antiviral and Zosyn x7 days. O2 study to be done. Pt to go home with home PT.      Pt is a 76M with PMHX MI/CAD, HTN, HLD, CHFrEF, ICM s/p AICD presents to [facility] ER c/o SOB/CARL and Nonproductive Cough x1-2 days s/p failed outpt abx for PNA admitted for Acute Hypoxic Respiratory Failure 2/2 Viral PNA/Influenza A and possible superimposed bacterial PNA. Initially needed HFNC. Titrated off HFNC and on 4L NC. PE was ruled out on CTA. CT with improved infiltrates. Seen by the Pulmonology team, stated steroids IV q12hr. Improved with supportive care with IS, mucinex, tylenol, duonebs, Chest PT. Completed antiviral and Zosyn x7 days. Patient with SOB on 5/10/22 and desaturated in the AM; ID consult was called, he was restarted on empiric antibiotics - Vancomycin and Zosyn. Recalled Pulmonology and restarted on IV steroids. CTA 5/10 negative for PE but noted new mucous plug occluding the bronchus intermedius and middle and right  lower lobar bronchi, middle lobar collapse, and pneumonia and/or aspiration in the lower lobes. O2 study to be done. Pt to go home with home PT.      Pt is a 76M with PMHX MI/CAD, HTN, HLD, CHFrEF, ICM s/p AICD presents to [facility] ER c/o SOB/CARL and Nonproductive Cough x1-2 days s/p failed outpt abx for PNA admitted for Acute Hypoxic Respiratory Failure 2/2 Viral PNA/Influenza A and possible superimposed bacterial PNA. Initially needed HFNC. Titrated off HFNC and on 4L NC. PE was ruled out on CTA. CT with improved infiltrates. Seen by the Pulmonology team, stated steroids IV q12hr. Improved with supportive care with IS, mucinex, tylenol, duonebs, Chest PT. Completed antiviral and Zosyn x7 days. Patient with SOB on 5/10/22 and desaturated in the AM; ID consult was called, he was restarted on empiric antibiotics - Vancomycin and Zosyn. Recalled Pulmonology and restarted on IV steroids. CTA 5/10 negative for PE but noted new mucous plug occluding the bronchus intermedius and middle and right  lower lobar bronchi, middle lobar collapse, and pneumonia and/or aspiration in the lower lobes. Hospital course complicated by oral thrush, treated with fluconazole. GI consulted, patient advised to follow up out patient in 1 week with Dr. Combs for eventual EGD outpatient. O2 ambulation tet performed, patient remained normoxic while ambulating with walker. PT eval completed with recs for home with home PT. Patient is medically stable for discharge with close out patient follow. Patient is advised to take all medications as prescribed and follow up with primary care provider in 1 week for repeat blood work and reassessment and outpatient Pulmonlogy with Dr. Qureshi's group.      Pt is a 76M with PMHX MI/CAD, HTN, HLD, CHFrEF, ICM s/p AICD presents to [facility] ER c/o SOB/CARL and Nonproductive Cough x1-2 days s/p failed outpt abx for PNA admitted for Acute Hypoxic Respiratory Failure 2/2 Viral PNA/Influenza A and possible superimposed bacterial PNA. Initially needed HFNC. Titrated off HFNC and on 4L NC. PE was ruled out on CTA. CT with improved infiltrates. Seen by the Pulmonology team, stated steroids IV q12hr. Improved with supportive care with IS, mucinex, tylenol, duonebs, Chest PT. Completed antiviral and Zosyn x7 days. Patient with SOB on 5/10/22 and desaturated in the AM; ID consult was called, he was restarted on empiric antibiotics - Vancomycin and Zosyn. Recalled Pulmonology and restarted on IV steroids. CTA 5/10 negative for PE but noted new mucous plug occluding the bronchus intermedius and middle and right  lower lobar bronchi, middle lobar collapse, and pneumonia and/or aspiration in the lower lobes. Hospital course complicated by oral thrush, treated with fluconazole. GI consulted, patient advised to follow up out patient in 1 week with Dr. Combs for eventual EGD outpatient. O2 ambulation test performed, patient remained normoxic while ambulating with walker. PT eval completed with recs for home with home PT. Patient is medically stable for discharge with close out patient follow. Patient is advised to take all medications as prescribed and follow up with primary care provider in 1 week for repeat blood work and reassessment and outpatient Pulmonlogy with Dr. Qureshi's group.      Pt is a 76M with PMHX MI/CAD, HTN, HLD, CHFrEF, ICM s/p AICD presents to [facility] ER c/o SOB/CARL and Nonproductive Cough x1-2 days s/p failed outpt abx for PNA admitted for Acute Hypoxic Respiratory Failure 2/2 Viral PNA/Influenza A and possible superimposed bacterial PNA. Initially needed HFNC. Titrated off HFNC and on 4L NC. PE was ruled out on CTA. CT with improved infiltrates. Seen by the Pulmonology team, stated steroids IV q12hr. Improved with supportive care with IS, mucinex, tylenol, duonebs, Chest PT. Completed antiviral and Zosyn x7 days. Patient with SOB on 5/10/22 and desaturated in the AM; ID consult was called, he was restarted on empiric antibiotics - Vancomycin and Zosyn. Recalled Pulmonology and restarted on IV steroids. CTA 5/10 negative for PE but noted new mucous plug occluding the bronchus intermedius and middle and right  lower lobar bronchi, middle lobar collapse, and pneumonia and/or aspiration in the lower lobes. Hospital course complicated by oral thrush, treated with fluconazole. GI consulted, patient advised to follow up out patient in 1 week with Dr. Combs for eventual EGD outpatient. O2 ambulation test performed, patient remained normoxic while ambulating with walker. PT eval completed with recs for home with home PT. Patient is medically stable for discharge with close out patient follow. Patient is advised to take all medications as prescribed and follow up with primary care provider in 1 week for repeat blood work and reassessment and outpatient Pulmonlogy with Dr. Qureshi's group.

## 2022-05-09 NOTE — DISCHARGE NOTE PROVIDER - NPI NUMBER (FOR SYSADMIN USE ONLY) :
[[id number]] [[id number]],[[id number]],[[id number]],[UNKNOWN] [[id number]],[[id number]],[UNKNOWN],[UNKNOWN]

## 2022-05-09 NOTE — DISCHARGE NOTE PROVIDER - ATTENDING DISCHARGE PHYSICAL EXAMINATION:
CONSTITUTIONAL: NAD, elderly RA  ENMT: Moist oral mucosa, no pharyngeal injection or exudates; normal dentition  RESPIRATORY: Normal respiratory effort; lungs are clear to auscultation bilaterally, on NC  CARDIOVASCULAR: Regular rate and rhythm, normal S1 and S2, no murmur/rub/gallop; Peripheral pulses are 2+ bilaterally  ABDOMEN: Nontender to palpation, normoactive bowel sounds, no rebound/guarding;   MUSCLOSKELETAL:  No clubbing or cyanosis of digits; no joint swelling or tenderness to palpation  PSYCH: A+O to person, place, and time; affect appropriate  NEUROLOGY: CN 2-12 are intact and symmetric; no gross sensory deficits;   SKIN: No rashes; no palpable lesions

## 2022-05-09 NOTE — PROGRESS NOTE ADULT - ASSESSMENT
76M with PMHX MI/CAD, HTN, HLD, CHFrEF, ICM s/p AICD presents to [facility] ER c/o SOB/CARL and Nonproductive Cough x1-2 days s/p failed outpt abx for PNA admitted for Acute Hypoxic Respiratory Failure 2/2 Viral PNA/Influenza and possible superimposed bacterial PNA.    #Acute Hypoxic Respiratory Failure 2/2 multifocal viral PNA from Influenza A on underlying HFrEF  - titrating off HFNC - slow to improve - at times on 4  LPM  - PE was ruled out on CTA  - CT with improved infiltrates  - c/w  steroids IV q12hr as per Pulmonology team recommendations  - completed antiviral and  Zosyn 7days   - prone as tolerated  - supportive care with IS, mucinex, tylenol, duonebs, Chest PT    #Leukocytosis  - infectious with recent steroid use  -repeat  CBC  - management as above    #MI/CAD  #HTN/HLD  #Chronic CHFrEF  #ICM s/p AICD  - ASA + Plavix   - Amiodarone,  Coreg , Entresto  - Amlodipine,  Atorvastatin, - Lasix 20mg    DVT ppx - lovenox  Diet - DASH/TLC  Dispo - acute - home. He will need home O2 for prolonged wean and underlying chronic cardiac issues.    pending home O2 arrangements   code/social - DNR/DNI

## 2022-05-09 NOTE — DISCHARGE NOTE PROVIDER - CARE PROVIDERS DIRECT ADDRESSES
,[email or web address],DirectAddress_Unknown,DirectAddress_Unknown,DirectAddress_Unknown ,DirectAddress_Unknown,DirectAddress_Unknown,DirectAddress_Unknown,DirectAddress_Unknown

## 2022-05-09 NOTE — DISCHARGE NOTE PROVIDER - NSDCMRMEDTOKEN_GEN_ALL_CORE_FT
amiodarone 200 mg oral tablet: 1 tab(s) orally once a day  amLODIPine 5 mg oral tablet: 1 tab(s) orally once a day  aspirin 81 mg oral delayed release tablet: 1 tab(s) orally once a day  atorvastatin 40 mg oral tablet: 1 tab(s) orally once a day (at bedtime)  carvedilol 6.25 mg oral tablet: 1 tab(s) orally 2 times a day  clopidogrel 75 mg oral tablet: 1 tab(s) orally once a day  Entresto 49 mg-51 mg oral tablet: 1 tab(s) orally 2 times a day  furosemide 20 mg oral tablet: 1 tab(s) orally once a day   albuterol 90 mcg/inh inhalation aerosol: 2 puff(s) inhaled every 6 hours, As Needed -for shortness of breath and/or wheezing   amiodarone 200 mg oral tablet: 1 tab(s) orally once a day  amLODIPine 5 mg oral tablet: 1 tab(s) orally once a day  aspirin 81 mg oral delayed release tablet: 1 tab(s) orally once a day  atorvastatin 40 mg oral tablet: 1 tab(s) orally once a day (at bedtime)  Bactrim 400 mg-80 mg oral tablet: 1 milligram(s) orally once a day   carvedilol 6.25 mg oral tablet: 1 tab(s) orally 2 times a day  clopidogrel 75 mg oral tablet: 1 tab(s) orally once a day  Entresto 49 mg-51 mg oral tablet: 1 tab(s) orally 2 times a day  fluconazole 150 mg oral tablet: 1 tab(s) orally once a day  furosemide 20 mg oral tablet: 1 tab(s) orally once a day  nystatin 100,000 units/mL oral suspension: 5 milliliter(s) orally 4 times a day  pantoprazole 40 mg oral delayed release tablet: 1 tab(s) orally once a day (before a meal)  predniSONE 10 mg oral tablet: 5 tab(s) orally once a day x 5 days  4 tab(s) orally once a day x 5 days  3 tab(s) orally once a day x 5 days  2 tab(s) orally once a day x 5 days  1 tab(s) orally once a day x 5 days  saccharomyces boulardii lyo 250 mg oral capsule: 1 cap(s) orally 2 times a day

## 2022-05-09 NOTE — DISCHARGE NOTE PROVIDER - PROVIDER TOKENS
PROVIDER:[TOKEN:[[id number]],FOLLOWUP:[1 week]] PROVIDER:[TOKEN:[[id number]],FOLLOWUP:[1 week]],PROVIDER:[TOKEN:[[id number]],FOLLOWUP:[1 week]],FREE:[LAST:[Cardiology],PHONE:[(   )    -],FAX:[(   )    -],FOLLOWUP:[1 week]],FREE:[LAST:[Primary Care Provider],PHONE:[(   )    -],FAX:[(   )    -],FOLLOWUP:[1 week]] PROVIDER:[TOKEN:[[id number]],FOLLOWUP:[1 week]],PROVIDER:[TOKEN:[[id number]],FOLLOWUP:[1 week]],FREE:[LAST:[Cardiology],PHONE:[(   )    -],FAX:[(   )    -],FOLLOWUP:[1 week]],FREE:[LAST:[Primary Care Provider],PHONE:[(   )    -],FAX:[(   )    -],FOLLOWUP:[1 week]]

## 2022-05-10 DIAGNOSIS — T17.500A UNSPECIFIED FOREIGN BODY IN BRONCHUS CAUSING ASPHYXIATION, INITIAL ENCOUNTER: ICD-10-CM

## 2022-05-10 DIAGNOSIS — J10.00 INFLUENZA DUE TO OTHER IDENTIFIED INFLUENZA VIRUS WITH UNSPECIFIED TYPE OF PNEUMONIA: ICD-10-CM

## 2022-05-10 LAB
ANION GAP SERPL CALC-SCNC: 11 MMOL/L — SIGNIFICANT CHANGE UP (ref 5–17)
BASE EXCESS BLDA CALC-SCNC: 3.4 MMOL/L — HIGH (ref -2–3)
BLOOD GAS COMMENTS ARTERIAL: SIGNIFICANT CHANGE UP
BUN SERPL-MCNC: 35.4 MG/DL — HIGH (ref 8–20)
CALCIUM SERPL-MCNC: 9.1 MG/DL — SIGNIFICANT CHANGE UP (ref 8.6–10.2)
CHLORIDE SERPL-SCNC: 101 MMOL/L — SIGNIFICANT CHANGE UP (ref 98–107)
CO2 SERPL-SCNC: 21 MMOL/L — LOW (ref 22–29)
CREAT SERPL-MCNC: 0.83 MG/DL — SIGNIFICANT CHANGE UP (ref 0.5–1.3)
EGFR: 90 ML/MIN/1.73M2 — SIGNIFICANT CHANGE UP
GAS PNL BLDA: SIGNIFICANT CHANGE UP
GAS PNL BLDA: SIGNIFICANT CHANGE UP
GLUCOSE SERPL-MCNC: 184 MG/DL — HIGH (ref 70–99)
HCO3 BLDA-SCNC: 27 MMOL/L — SIGNIFICANT CHANGE UP (ref 21–28)
HCT VFR BLD CALC: 47.1 % — SIGNIFICANT CHANGE UP (ref 39–50)
HGB BLD-MCNC: 15.9 G/DL — SIGNIFICANT CHANGE UP (ref 13–17)
HOROWITZ INDEX BLDA+IHG-RTO: 90 — SIGNIFICANT CHANGE UP
MCHC RBC-ENTMCNC: 33.6 PG — SIGNIFICANT CHANGE UP (ref 27–34)
MCHC RBC-ENTMCNC: 33.8 GM/DL — SIGNIFICANT CHANGE UP (ref 32–36)
MCV RBC AUTO: 99.6 FL — SIGNIFICANT CHANGE UP (ref 80–100)
PCO2 BLDA: 35 MMHG — SIGNIFICANT CHANGE UP (ref 35–48)
PH BLDA: 7.49 — HIGH (ref 7.35–7.45)
PLATELET # BLD AUTO: 299 K/UL — SIGNIFICANT CHANGE UP (ref 150–400)
PO2 BLDA: 82 MMHG — LOW (ref 83–108)
POTASSIUM SERPL-MCNC: 5.3 MMOL/L — SIGNIFICANT CHANGE UP (ref 3.5–5.3)
POTASSIUM SERPL-SCNC: 5.3 MMOL/L — SIGNIFICANT CHANGE UP (ref 3.5–5.3)
PROCALCITONIN SERPL-MCNC: 0.1 NG/ML — SIGNIFICANT CHANGE UP (ref 0.02–0.1)
RAPID RVP RESULT: SIGNIFICANT CHANGE UP
RBC # BLD: 4.73 M/UL — SIGNIFICANT CHANGE UP (ref 4.2–5.8)
RBC # FLD: 14.4 % — SIGNIFICANT CHANGE UP (ref 10.3–14.5)
SAO2 % BLDA: 98.3 % — HIGH (ref 94–98)
SARS-COV-2 RNA SPEC QL NAA+PROBE: SIGNIFICANT CHANGE UP
SODIUM SERPL-SCNC: 133 MMOL/L — LOW (ref 135–145)
WBC # BLD: 23.28 K/UL — HIGH (ref 3.8–10.5)
WBC # FLD AUTO: 23.28 K/UL — HIGH (ref 3.8–10.5)

## 2022-05-10 PROCEDURE — 99223 1ST HOSP IP/OBS HIGH 75: CPT

## 2022-05-10 PROCEDURE — 93970 EXTREMITY STUDY: CPT | Mod: 26

## 2022-05-10 PROCEDURE — 99233 SBSQ HOSP IP/OBS HIGH 50: CPT

## 2022-05-10 PROCEDURE — 99291 CRITICAL CARE FIRST HOUR: CPT

## 2022-05-10 PROCEDURE — 71275 CT ANGIOGRAPHY CHEST: CPT | Mod: 26

## 2022-05-10 PROCEDURE — 71045 X-RAY EXAM CHEST 1 VIEW: CPT | Mod: 26

## 2022-05-10 RX ORDER — ACETYLCYSTEINE 200 MG/ML
4 VIAL (ML) MISCELLANEOUS EVERY 6 HOURS
Refills: 0 | Status: COMPLETED | OUTPATIENT
Start: 2022-05-10 | End: 2022-05-12

## 2022-05-10 RX ORDER — ALBUTEROL 90 UG/1
2.5 AEROSOL, METERED ORAL EVERY 6 HOURS
Refills: 0 | Status: DISCONTINUED | OUTPATIENT
Start: 2022-05-10 | End: 2022-05-12

## 2022-05-10 RX ORDER — FUROSEMIDE 40 MG
20 TABLET ORAL ONCE
Refills: 0 | Status: COMPLETED | OUTPATIENT
Start: 2022-05-10 | End: 2022-05-10

## 2022-05-10 RX ORDER — FUROSEMIDE 40 MG
40 TABLET ORAL ONCE
Refills: 0 | Status: COMPLETED | OUTPATIENT
Start: 2022-05-10 | End: 2022-05-10

## 2022-05-10 RX ORDER — MORPHINE SULFATE 50 MG/1
2 CAPSULE, EXTENDED RELEASE ORAL ONCE
Refills: 0 | Status: DISCONTINUED | OUTPATIENT
Start: 2022-05-10 | End: 2022-05-10

## 2022-05-10 RX ORDER — VANCOMYCIN HCL 1 G
1000 VIAL (EA) INTRAVENOUS ONCE
Refills: 0 | Status: COMPLETED | OUTPATIENT
Start: 2022-05-10 | End: 2022-05-10

## 2022-05-10 RX ORDER — PIPERACILLIN AND TAZOBACTAM 4; .5 G/20ML; G/20ML
3.38 INJECTION, POWDER, LYOPHILIZED, FOR SOLUTION INTRAVENOUS EVERY 8 HOURS
Refills: 0 | Status: DISCONTINUED | OUTPATIENT
Start: 2022-05-10 | End: 2022-05-16

## 2022-05-10 RX ORDER — PIPERACILLIN AND TAZOBACTAM 4; .5 G/20ML; G/20ML
3.38 INJECTION, POWDER, LYOPHILIZED, FOR SOLUTION INTRAVENOUS ONCE
Refills: 0 | Status: COMPLETED | OUTPATIENT
Start: 2022-05-10 | End: 2022-05-10

## 2022-05-10 RX ORDER — SACCHAROMYCES BOULARDII 250 MG
250 POWDER IN PACKET (EA) ORAL
Refills: 0 | Status: DISCONTINUED | OUTPATIENT
Start: 2022-05-10 | End: 2022-05-18

## 2022-05-10 RX ADMIN — CARVEDILOL PHOSPHATE 6.25 MILLIGRAM(S): 80 CAPSULE, EXTENDED RELEASE ORAL at 17:00

## 2022-05-10 RX ADMIN — Medication 250 MILLIGRAM(S): at 17:00

## 2022-05-10 RX ADMIN — ALBUTEROL 2.5 MILLIGRAM(S): 90 AEROSOL, METERED ORAL at 17:07

## 2022-05-10 RX ADMIN — Medication 5 MILLIGRAM(S): at 22:14

## 2022-05-10 RX ADMIN — Medication 81 MILLIGRAM(S): at 12:52

## 2022-05-10 RX ADMIN — SACUBITRIL AND VALSARTAN 1 TABLET(S): 24; 26 TABLET, FILM COATED ORAL at 06:48

## 2022-05-10 RX ADMIN — AMLODIPINE BESYLATE 5 MILLIGRAM(S): 2.5 TABLET ORAL at 12:53

## 2022-05-10 RX ADMIN — ENOXAPARIN SODIUM 40 MILLIGRAM(S): 100 INJECTION SUBCUTANEOUS at 06:46

## 2022-05-10 RX ADMIN — AMIODARONE HYDROCHLORIDE 200 MILLIGRAM(S): 400 TABLET ORAL at 06:48

## 2022-05-10 RX ADMIN — Medication 250 MILLIGRAM(S): at 10:32

## 2022-05-10 RX ADMIN — Medication 125 MILLIGRAM(S): at 02:15

## 2022-05-10 RX ADMIN — Medication 3 MILLILITER(S): at 10:34

## 2022-05-10 RX ADMIN — Medication 40 MILLIGRAM(S): at 06:49

## 2022-05-10 RX ADMIN — Medication 40 MILLIGRAM(S): at 22:15

## 2022-05-10 RX ADMIN — Medication 4 MILLILITER(S): at 20:41

## 2022-05-10 RX ADMIN — ATORVASTATIN CALCIUM 40 MILLIGRAM(S): 80 TABLET, FILM COATED ORAL at 22:15

## 2022-05-10 RX ADMIN — Medication 4 MILLILITER(S): at 17:07

## 2022-05-10 RX ADMIN — SACUBITRIL AND VALSARTAN 1 TABLET(S): 24; 26 TABLET, FILM COATED ORAL at 17:01

## 2022-05-10 RX ADMIN — Medication 20 MILLIGRAM(S): at 00:57

## 2022-05-10 RX ADMIN — Medication 20 MILLIGRAM(S): at 06:48

## 2022-05-10 RX ADMIN — Medication 10 MILLILITER(S): at 12:55

## 2022-05-10 RX ADMIN — SENNA PLUS 2 TABLET(S): 8.6 TABLET ORAL at 22:14

## 2022-05-10 RX ADMIN — PIPERACILLIN AND TAZOBACTAM 25 GRAM(S): 4; .5 INJECTION, POWDER, LYOPHILIZED, FOR SOLUTION INTRAVENOUS at 22:15

## 2022-05-10 RX ADMIN — Medication 40 MILLIGRAM(S): at 15:03

## 2022-05-10 RX ADMIN — MORPHINE SULFATE 2 MILLIGRAM(S): 50 CAPSULE, EXTENDED RELEASE ORAL at 00:45

## 2022-05-10 RX ADMIN — CARVEDILOL PHOSPHATE 6.25 MILLIGRAM(S): 80 CAPSULE, EXTENDED RELEASE ORAL at 06:49

## 2022-05-10 RX ADMIN — CLOPIDOGREL BISULFATE 75 MILLIGRAM(S): 75 TABLET, FILM COATED ORAL at 12:52

## 2022-05-10 RX ADMIN — MORPHINE SULFATE 2 MILLIGRAM(S): 50 CAPSULE, EXTENDED RELEASE ORAL at 00:29

## 2022-05-10 RX ADMIN — ALBUTEROL 2.5 MILLIGRAM(S): 90 AEROSOL, METERED ORAL at 00:25

## 2022-05-10 RX ADMIN — PIPERACILLIN AND TAZOBACTAM 200 GRAM(S): 4; .5 INJECTION, POWDER, LYOPHILIZED, FOR SOLUTION INTRAVENOUS at 12:58

## 2022-05-10 RX ADMIN — ALBUTEROL 2.5 MILLIGRAM(S): 90 AEROSOL, METERED ORAL at 20:41

## 2022-05-10 NOTE — PROGRESS NOTE ADULT - ASSESSMENT
76M with PMHX MI/CAD, HTN, HLD, CHFrEF, ICM s/p AICD presents to [facility] ER c/o SOB/CARL and Nonproductive Cough x1-2 days s/p failed outpt abx for PNA admitted for Acute Hypoxic Respiratory Failure 2/2 Viral PNA/Influenza and possible superimposed bacterial PNA.    #Acute Hypoxic Respiratory Failure 2/2 multifocal viral PNA from Influenza A on underlying HFrEF now complicated by mucouos plugging possible secondary bacterial infection   - wean off HFNC as tolerated   - PE was ruled out on CTA, duplex - neg   - CT with mucous plugging with rt middle lobe collapse  - completed antiviral and Zosyn 7days   - prone as tolerated  - supportive care with IS, mucinex, tylenol, duonebs, Chest PT    #Leukocytosis  - infectious with recent steroid use  -repeat  CBC  - management as above    #MI/CAD  #HTN/HLD  #Chronic CHFrEF  #ICM s/p AICD  - ASA + Plavix   - Amiodarone,  Coreg , Entresto  - Amlodipine,  Atorvastatin, - Lasix 20mg    DVT ppx - lovenox  Diet - DASH/TLC  Dispo - acute - home.  code/social - DNR/DNI

## 2022-05-10 NOTE — CHART NOTE - NSCHARTNOTEFT_GEN_A_CORE
PA NOTE-MEDICINE    Called by RN due to Pt desatting to 84% on 6 L NC  with + WOB   76M with PMHX MI/CAD, HTN, HLD, CHFrEF, ICM s/p AICD Admitted due to Acute Hypoxic Respiratory Failure 2/2 Viral PNA/Influenza and possible superimposed bacterial PNAs with underlying HFrEF  s/p failed outpt abx for PNA  PE was r/o on CTA  Completed antiviral and Zosyn 7days Currently weaned to Prednisone 20 mg Daily from Solumederol On RTC Duonebs with Prn Albuterol Nebs Pt was weaned from HFNC to 6 L nc x past 3 Days   Pt on ppx - lovenox    Denies: CP Dizzinesss N/V    T(C): 36.4 (10 May 2022 00:01), Max: 36.7 (09 May 2022 04:41)  T(F): 97.6 (10 May 2022 00:01), Max: 98.1 (09 May 2022 04:41)  HR: 67 (10 May 2022 02:23) (63 - 83)  BP: 108/72 (10 May 2022 02:23) (105/72 - 119/74)  RR: 20 (10 May 2022 00:01) (18 - 20)  SpO2: 85% (10 May 2022 02:23) (85% - 98%) 6 Lnc    General; WDWN Male in sl Respiratory distress with Slight WOB Able to Speak in Full sentences    Cardiac; S1S2 + RRR  Lungs: + Wheezing + Rhonchi B/L A-B   Abd: NDNT No Guarding/Rigidity + BS x 4   Ext: No C/C/E x 4   Integument; No Palor Warm/Dry    A/P Eval pt due to Desatting to 84% on 6 Lnc   CXR-Urgent -Similar to Previous cxr + GGO B/L   Venti mask 40%  Stat Duoneb x 1   Morphine 2 mg IVP x 1  Lasix 20 mg IVP x 1     Pt O2 Sat increased to 92% Pt felt better Decreased WOB    RN recalled PA due to pt Desatting again to 86% on VM 40%  ABG Stat  100 NRB  Solumederol 125 mg IVP x 1   ABG:   PH-7.50  pCO2-34  pO2-53  Hco3-26.5    Stat Hi Flow 100% @ 40 L   Pt's O2 sat increased to 93%   Decreased WOB, Resting Comfortably    Repeat ABG Ordered for 1 Hr (4 AM)   Continue to Monitor pt   Recall PA if Pt Desats < 92% or for any changes in pt Status   Will sign out to AM Team to follow up PA NOTE-MEDICINE    Called by RN due to Pt desatting to 84% on 6 L NC  with + WOB   76M with PMHX MI/CAD, HTN, HLD, CHFrEF, ICM s/p AICD Admitted due to Acute Hypoxic Respiratory Failure 2/2 Viral PNA/Influenza and possible superimposed bacterial PNAs with underlying HFrEF  s/p failed outpt abx for PNA  PE was r/o on CTA  Completed antiviral and Zosyn 7days Currently weaned to Prednisone 20 mg Daily from Solumederol On RTC Duonebs with Prn Albuterol Nebs Pt was weaned from HFNC to 6 L nc x past 3 Days   Pt on ppx - lovenox    Denies: CP Dizzinesss N/V    T(C): 36.4 (10 May 2022 00:01), Max: 36.7 (09 May 2022 04:41)  T(F): 97.6 (10 May 2022 00:01), Max: 98.1 (09 May 2022 04:41)  HR: 67 (10 May 2022 02:23) (63 - 83)  BP: 108/72 (10 May 2022 02:23) (105/72 - 119/74)  RR: 20 (10 May 2022 00:01) (18 - 20)  SpO2: 85% (10 May 2022 02:23) (85% - 98%) 6 Lnc    General; WDWN Male in sl Respiratory distress with Slight WOB Able to Speak in Full sentences    Cardiac; S1S2 + RRR  Lungs: + Wheezing + Rhonchi B/L A-B   Abd: NDNT No Guarding/Rigidity + BS x 4   Ext: No C/C/E x 4   Integument; No Palor Warm/Dry    A/P Eval pt due to Desatting to 84% on 6 Lnc   CXR-Urgent -Similar to Previous cxr + GGO B/L   Venti mask 40%  Stat Duoneb x 1   Morphine 2 mg IVP x 1  Lasix 20 mg IVP x 1   Stat Covid-19 PCR Sent    Pt O2 Sat increased to 92% Pt felt better Decreased WOB    RN recalled PA due to pt Desatting again to 86% on VM 40%  ABG Stat  100 NRB  Solumederol 125 mg IVP x 1   ABG:   PH-7.50  pCO2-34  pO2-53  Hco3-26.5    Stat Hi Flow 100% @ 40 L   Pt's O2 sat increased to 93%   Decreased WOB, Resting Comfortably    Repeat ABG Ordered for 1 Hr (4 AM)   Continue to Monitor pt   Recall PA if Pt Desats < 92% or for any changes in pt Status   Will sign out to AM Team to follow up PA NOTE-MEDICINE    Called by RN due to Pt desatting to 84% on 6 L NC  with + WOB   76M with PMHX MI/CAD, HTN, HLD, CHFrEF, ICM s/p AICD Admitted due to Acute Hypoxic Respiratory Failure 2/2 Viral PNA/Influenza and possible superimposed bacterial PNAs with underlying HFrEF  s/p failed outpt abx for PNA  PE was r/o on CTA  Completed antiviral and Zosyn 7days Currently weaned to Prednisone 20 mg Daily from Solumederol On RTC Duonebs with Prn Albuterol Nebs Pt was weaned from HFNC to 6 L nc x past 3 Days   Pt on ppx - lovenox    Denies: CP Dizzinesss N/V    T(C): 36.4 (10 May 2022 00:01), Max: 36.7 (09 May 2022 04:41)  T(F): 97.6 (10 May 2022 00:01), Max: 98.1 (09 May 2022 04:41)  HR: 67 (10 May 2022 02:23) (63 - 83)  BP: 108/72 (10 May 2022 02:23) (105/72 - 119/74)  RR: 20 (10 May 2022 00:01) (18 - 20)  SpO2: 85% (10 May 2022 02:23) (85% - 98%) 6 Lnc    General; WDWN Male in sl Respiratory distress with Slight WOB Able to Speak in Full sentences    Cardiac; S1S2 + RRR  Lungs: + Wheezing + Rhonchi B/L A-B   Abd: NDNT No Guarding/Rigidity + BS x 4   Ext: No C/C/E x 4   Integument; No Palor Warm/Dry    A/P Eval pt due to Desatting to 84% on 6 Lnc   CXR-Urgent -Similar to Previous cxr + GGO B/L   Venti mask 40%  Stat Duoneb x 1   Morphine 2 mg IVP x 1  Lasix 20 mg IVP x 1   Stat Covid-19 PCR Sent    Pt O2 Sat increased to 92% Pt felt better Decreased WOB    RN recalled PA due to pt Desatting again to 86% on VM 40%  ABG Stat  100 NRB  Solumederol 125 mg IVP x 1   ABG:   PH-7.50  pCO2-34  pO2-53  Hco3-26.5    Stat Hi Flow 100% @ 40 L   Pt's O2 sat increased to 93%   Decreased WOB, Resting Comfortably    Repeat ABG Ordered for 1 Hr (4 AM)   Continue to Monitor pt   Recall PA if Pt Desats < 92% or for any changes in pt Status   Will sign out to AM Team to follow up    Addendum:     Repeat ABG:   ABG - ( 10 May 2022 04:15 )  pH, Arterial: 7.490 pH, Blood: x     /  pCO2: 35    /  pO2: 82    / HCO3: 27    / Base Excess: 3.4   /  SaO2: 98.3      Spoke to Hospitalist Dr Morales About Repeat ABG Results  Wants:  Lasix 40 mg IVP Now (Take BP before admin)  Increase in Hi flow to FIO2-100% at 60 L/Min Stat  Echo  B/L LE Venous Dopplers  Bl Cx x 2 stat  Then give:  Vanco 1 gm IVPB  Zosyn 3.375 mg IVPB x 1   ID Consult Called for this AM    Pt is Comfortable resting Hi flow in place NAD No WOB   Continue to Monitor Pt   Recall PA if any changes in pt Status   Will Sign Out to AM Team to follow

## 2022-05-10 NOTE — PROGRESS NOTE ADULT - ASSESSMENT
Acute hypoxemic resp failure  Flu A  Mucus plugging with V/Q inequality predominant on CT scan,No PE   aggressive pulmonary toilet, nebs /mucomyst  change to medrol  PipTazo - check sputum cultures -MRSA negative,  Wean HFNC as able  HOB elevated  DNR/critical- prognosis guarded

## 2022-05-10 NOTE — CONSULT NOTE ADULT - ASSESSMENT
This 76M with PMHX MI/CAD, HTN, HLD, CHFrEF, ICM s/p AICD presents to [facility] ER 4/27/22 c/o SOB/CARL and Nonproductive Cough x1-2 days s/p failed outpt abx for PNA admitted for Acute Hypoxic Respiratory Failure. Patient significantly hypoxic on arrival satting mid 80s on RA unimproved significantly on ventimask/NRB intermittently placed on BIPAP then placed on HFNC 45L 70% FIO2 satting SPO2 90%. Pt  seen/examined. Feels much beter on HFNC. CXR +BL infiltrates. CTA Chest negative for PE but +BL GGO. COVID Negative RVP positive Influenza AH3. Patient unimproved on outpt or inpt abx. ABX dc'd. Patient updated on plan. Also discussed with emergency contact patient's son Ruddy Zimmer Jr via Telephone and daughter. ROS negative unless mentioned.   Meds in ED: Vanco, Zosyn, 500cc IVFB, Duoneb x3, KCL IV 10meq x3, Mag Sulfate 2g IV, Lasix 40mg IV  (27 Apr 2022 23:17)    ID consulted called on 5/10/22, hospital day #13.   patient was admitted for acute hypoxic respiratory failure due to viral PNA/ Influenza and possible superimposed bacterial PNA.    Antimicrobial course reviewed.  patient was given Zosyn and Vancomycin on 4/27/22 1 dose each.  then he was given Oseltamivir from 4/27/22 - 5/2/22.  Azithromycin was given from 4/29/22 - 5/3/22, a 5-day course.    Ceftriaxone was given from 4/29 - 4/30/22, then escalated to Zosyn from 5/1/22 - 5/8/22, a 7 day course.    patient has also been getting systemic steroids from 4/28/22 to current.     patient still with SOB on 5/10/22 and desaturated in the AM; ID consult was called, he was restarted on empiric antibiotics - Vancomycin and Zosyn    Per notes, PE was ruled out on admission CT scan 4/27/22, with signs of a diffuse PNA.    A repeat CT scan on 5/10/22:  No pulmonary embolism. Since 6 days ago:   New mucous plug occluding the bronchus intermedius and middle and right  lower lobar bronchi, middle lobar collapse, and pneumonia and/or aspiration in the lower lobes.  Stable groundglass in the upper lobes, compatible with sequela of prior pneumonia/acute lung injury that was present 2 weeks prior.    patient is seen and examined in the room.   On high flow oxygen.        Impression:  shortness of breath  recent influenza  WBC elevation  hypoxia  mucus plugging      Plan:  SOB likely from the mucus plugging  - pulmonary consult follow up appreciated.   - agree with pulmonary toileting.     - should also check a BNP - was elevated on admission, Both zosyn and systemic steroids can lead to fluid retention.     WBC is elevated   - likely from steroids  procalc levels are actually down trending.   - Procalcitonin, Serum: 0.10 ng/mL (05-10-22 @ 10:05)  Procalcitonin, Serum: 0.20 ng/mL (04-28-22 @ 06:17)  - AGREE with a 5 day course of ZOSYN Only. if cultures are negative, then stop and watch after 5 days.   MRSA PCR from nares was negative in this hospital course,  Agree with stopping VANCOMYCIN.       Continue oxygen for his hypoxia.     - follow up all outstanding cultures  - trend temperature and WBC curve  - repeat cultures from blood and all sources if febrile.

## 2022-05-10 NOTE — CONSULT NOTE ADULT - TIME BILLING
reviewing labs, notes, orders, radiographic studies, as well as counseling and coordinating care with the relevant multidisciplinary team, including with the primary and consulting providers.
Review of hospital charts, including PACS and labs, and office and outside records if applicable.  Discussion of plans with referring service, and adjusting appropriate antibiotics.    prolong chart reviewed in this patient here for 13 days prior to consultation.     d/w Dr. Bello.

## 2022-05-11 DIAGNOSIS — I50.20 UNSPECIFIED SYSTOLIC (CONGESTIVE) HEART FAILURE: ICD-10-CM

## 2022-05-11 LAB
GRAM STN FLD: SIGNIFICANT CHANGE UP
SPECIMEN SOURCE: SIGNIFICANT CHANGE UP

## 2022-05-11 PROCEDURE — 99233 SBSQ HOSP IP/OBS HIGH 50: CPT

## 2022-05-11 PROCEDURE — 99232 SBSQ HOSP IP/OBS MODERATE 35: CPT

## 2022-05-11 RX ADMIN — ENOXAPARIN SODIUM 40 MILLIGRAM(S): 100 INJECTION SUBCUTANEOUS at 05:58

## 2022-05-11 RX ADMIN — PIPERACILLIN AND TAZOBACTAM 25 GRAM(S): 4; .5 INJECTION, POWDER, LYOPHILIZED, FOR SOLUTION INTRAVENOUS at 21:26

## 2022-05-11 RX ADMIN — Medication 81 MILLIGRAM(S): at 11:35

## 2022-05-11 RX ADMIN — CARVEDILOL PHOSPHATE 6.25 MILLIGRAM(S): 80 CAPSULE, EXTENDED RELEASE ORAL at 17:29

## 2022-05-11 RX ADMIN — SENNA PLUS 2 TABLET(S): 8.6 TABLET ORAL at 21:26

## 2022-05-11 RX ADMIN — Medication 40 MILLIGRAM(S): at 05:58

## 2022-05-11 RX ADMIN — Medication 250 MILLIGRAM(S): at 05:58

## 2022-05-11 RX ADMIN — PIPERACILLIN AND TAZOBACTAM 25 GRAM(S): 4; .5 INJECTION, POWDER, LYOPHILIZED, FOR SOLUTION INTRAVENOUS at 14:11

## 2022-05-11 RX ADMIN — ALBUTEROL 2.5 MILLIGRAM(S): 90 AEROSOL, METERED ORAL at 08:32

## 2022-05-11 RX ADMIN — Medication 5 MILLIGRAM(S): at 21:25

## 2022-05-11 RX ADMIN — Medication 4 MILLILITER(S): at 08:32

## 2022-05-11 RX ADMIN — Medication 40 MILLIGRAM(S): at 14:15

## 2022-05-11 RX ADMIN — CARVEDILOL PHOSPHATE 6.25 MILLIGRAM(S): 80 CAPSULE, EXTENDED RELEASE ORAL at 05:58

## 2022-05-11 RX ADMIN — AMIODARONE HYDROCHLORIDE 200 MILLIGRAM(S): 400 TABLET ORAL at 05:58

## 2022-05-11 RX ADMIN — ATORVASTATIN CALCIUM 40 MILLIGRAM(S): 80 TABLET, FILM COATED ORAL at 21:26

## 2022-05-11 RX ADMIN — SACUBITRIL AND VALSARTAN 1 TABLET(S): 24; 26 TABLET, FILM COATED ORAL at 06:01

## 2022-05-11 RX ADMIN — CLOPIDOGREL BISULFATE 75 MILLIGRAM(S): 75 TABLET, FILM COATED ORAL at 11:35

## 2022-05-11 RX ADMIN — ALBUTEROL 2.5 MILLIGRAM(S): 90 AEROSOL, METERED ORAL at 21:01

## 2022-05-11 RX ADMIN — Medication 4 MILLILITER(S): at 04:07

## 2022-05-11 RX ADMIN — Medication 4 MILLILITER(S): at 15:37

## 2022-05-11 RX ADMIN — ALBUTEROL 2.5 MILLIGRAM(S): 90 AEROSOL, METERED ORAL at 04:07

## 2022-05-11 RX ADMIN — ALBUTEROL 2.5 MILLIGRAM(S): 90 AEROSOL, METERED ORAL at 15:37

## 2022-05-11 RX ADMIN — Medication 250 MILLIGRAM(S): at 17:29

## 2022-05-11 RX ADMIN — SACUBITRIL AND VALSARTAN 1 TABLET(S): 24; 26 TABLET, FILM COATED ORAL at 17:29

## 2022-05-11 RX ADMIN — Medication 40 MILLIGRAM(S): at 21:25

## 2022-05-11 RX ADMIN — PIPERACILLIN AND TAZOBACTAM 25 GRAM(S): 4; .5 INJECTION, POWDER, LYOPHILIZED, FOR SOLUTION INTRAVENOUS at 05:59

## 2022-05-11 RX ADMIN — AMLODIPINE BESYLATE 5 MILLIGRAM(S): 2.5 TABLET ORAL at 05:58

## 2022-05-11 NOTE — PROGRESS NOTE ADULT - CRITICAL CARE ATTENDING COMMENT
Review of notes, orders, labs and images on all accessible EHR platforms  Bedside evaluation  Coordination with all active services, nursing and respiratory
greater than 50% of time spent reviewing labs, notes, orders and radiographs, coordinating care  discussed with pt, family and med team

## 2022-05-11 NOTE — PROGRESS NOTE ADULT - ASSESSMENT
76M with PMHX MI/CAD, HTN, HLD, CHFrEF, ICM s/p AICD presents to [facility] ER c/o SOB/CARL and Nonproductive Cough x1-2 days s/p failed outpt abx for PNA admitted for Acute Hypoxic Respiratory Failure 2/2 Viral PNA/Influenza and possible superimposed bacterial PNA.    #Acute Hypoxic Respiratory Failure 2/2 multifocal viral PNA from Influenza A on underlying HFrEF now complicated by mucouos plugging possible secondary bacterial infection   - wean off HFNC as tolerated   - PE was ruled out on CTA, duplex - neg   - CT with mucous plugging with rt middle lobe collapse  - on zosyn per ID and pulm   - prone as tolerated, supportive care with IS, mucinex, tylenol, duonebs, Chest PT    #Leukocytosis  - infectious with recent steroid use  -repeat  CBC  - management as above    #MI/CAD  #HTN/HLD  #Chronic CHFrEF  #ICM s/p AICD  - ASA + Plavix   - Amiodarone,  Coreg , Entresto  - Amlodipine,  Atorvastatin, - Lasix 20mg    DVT ppx - lovenox  Diet - DASH/TLC  Dispo - acute   code/social - DNR/DNI

## 2022-05-11 NOTE — PROGRESS NOTE ADULT - PROBLEM SELECTOR PROBLEM 3
Ejection fraction < 50%
Mucus plugging of bronchi
Ejection fraction < 50%
Mucus plugging of bronchi

## 2022-05-11 NOTE — PROGRESS NOTE ADULT - ASSESSMENT
Assess    Acute hypoxemic resp failure  Flu A  Mucus plugging with V/Q inequality predominant on CT scan,No PE   Severe Cardiomyopathy with severely reduced EF     Rec    Cardiology Evaluation   Aggressive pulmonary toilet, nebs /mucomyst  change to medrol  PipTazo - check sputum cultures -MRSA negative,  Wean HFNC as able  HOB elevated  DNR/critical- prognosis poor

## 2022-05-11 NOTE — PROGRESS NOTE ADULT - PROBLEM SELECTOR PROBLEM 2
Pulmonary infiltrates

## 2022-05-12 PROCEDURE — 99232 SBSQ HOSP IP/OBS MODERATE 35: CPT

## 2022-05-12 PROCEDURE — 99233 SBSQ HOSP IP/OBS HIGH 50: CPT

## 2022-05-12 RX ORDER — PANTOPRAZOLE SODIUM 20 MG/1
40 TABLET, DELAYED RELEASE ORAL
Refills: 0 | Status: DISCONTINUED | OUTPATIENT
Start: 2022-05-12 | End: 2022-05-18

## 2022-05-12 RX ORDER — NYSTATIN 500MM UNIT
500000 POWDER (EA) MISCELLANEOUS
Refills: 0 | Status: DISCONTINUED | OUTPATIENT
Start: 2022-05-12 | End: 2022-05-18

## 2022-05-12 RX ORDER — ALBUTEROL 90 UG/1
2 AEROSOL, METERED ORAL EVERY 6 HOURS
Refills: 0 | Status: COMPLETED | OUTPATIENT
Start: 2022-05-12 | End: 2023-04-10

## 2022-05-12 RX ORDER — ALBUTEROL 90 UG/1
2 AEROSOL, METERED ORAL EVERY 6 HOURS
Refills: 0 | Status: DISCONTINUED | OUTPATIENT
Start: 2022-05-12 | End: 2022-05-18

## 2022-05-12 RX ADMIN — Medication 40 MILLIGRAM(S): at 05:09

## 2022-05-12 RX ADMIN — SACUBITRIL AND VALSARTAN 1 TABLET(S): 24; 26 TABLET, FILM COATED ORAL at 17:57

## 2022-05-12 RX ADMIN — ENOXAPARIN SODIUM 40 MILLIGRAM(S): 100 INJECTION SUBCUTANEOUS at 05:09

## 2022-05-12 RX ADMIN — PIPERACILLIN AND TAZOBACTAM 25 GRAM(S): 4; .5 INJECTION, POWDER, LYOPHILIZED, FOR SOLUTION INTRAVENOUS at 21:50

## 2022-05-12 RX ADMIN — Medication 4 MILLILITER(S): at 08:06

## 2022-05-12 RX ADMIN — Medication 81 MILLIGRAM(S): at 12:17

## 2022-05-12 RX ADMIN — Medication 40 MILLIGRAM(S): at 14:21

## 2022-05-12 RX ADMIN — ALBUTEROL 2 PUFF(S): 90 AEROSOL, METERED ORAL at 08:48

## 2022-05-12 RX ADMIN — SENNA PLUS 2 TABLET(S): 8.6 TABLET ORAL at 21:49

## 2022-05-12 RX ADMIN — Medication 250 MILLIGRAM(S): at 05:09

## 2022-05-12 RX ADMIN — ATORVASTATIN CALCIUM 40 MILLIGRAM(S): 80 TABLET, FILM COATED ORAL at 21:49

## 2022-05-12 RX ADMIN — CARVEDILOL PHOSPHATE 6.25 MILLIGRAM(S): 80 CAPSULE, EXTENDED RELEASE ORAL at 17:57

## 2022-05-12 RX ADMIN — PIPERACILLIN AND TAZOBACTAM 25 GRAM(S): 4; .5 INJECTION, POWDER, LYOPHILIZED, FOR SOLUTION INTRAVENOUS at 14:22

## 2022-05-12 RX ADMIN — SACUBITRIL AND VALSARTAN 1 TABLET(S): 24; 26 TABLET, FILM COATED ORAL at 05:09

## 2022-05-12 RX ADMIN — AMIODARONE HYDROCHLORIDE 200 MILLIGRAM(S): 400 TABLET ORAL at 05:10

## 2022-05-12 RX ADMIN — Medication 500000 UNIT(S): at 17:56

## 2022-05-12 RX ADMIN — PIPERACILLIN AND TAZOBACTAM 25 GRAM(S): 4; .5 INJECTION, POWDER, LYOPHILIZED, FOR SOLUTION INTRAVENOUS at 05:11

## 2022-05-12 RX ADMIN — Medication 250 MILLIGRAM(S): at 17:57

## 2022-05-12 RX ADMIN — CARVEDILOL PHOSPHATE 6.25 MILLIGRAM(S): 80 CAPSULE, EXTENDED RELEASE ORAL at 05:10

## 2022-05-12 RX ADMIN — Medication 500000 UNIT(S): at 12:25

## 2022-05-12 RX ADMIN — ALBUTEROL 2 PUFF(S): 90 AEROSOL, METERED ORAL at 21:50

## 2022-05-12 RX ADMIN — Medication 500000 UNIT(S): at 21:49

## 2022-05-12 RX ADMIN — Medication 40 MILLIGRAM(S): at 21:49

## 2022-05-12 RX ADMIN — CLOPIDOGREL BISULFATE 75 MILLIGRAM(S): 75 TABLET, FILM COATED ORAL at 12:18

## 2022-05-12 RX ADMIN — ALBUTEROL 2.5 MILLIGRAM(S): 90 AEROSOL, METERED ORAL at 08:05

## 2022-05-12 RX ADMIN — ALBUTEROL 2.5 MILLIGRAM(S): 90 AEROSOL, METERED ORAL at 04:47

## 2022-05-12 RX ADMIN — AMLODIPINE BESYLATE 5 MILLIGRAM(S): 2.5 TABLET ORAL at 05:10

## 2022-05-12 RX ADMIN — Medication 4 MILLILITER(S): at 04:46

## 2022-05-12 NOTE — PROGRESS NOTE ADULT - ASSESSMENT
This 76M with PMHX MI/CAD, HTN, HLD, CHFrEF, ICM s/p AICD presents to [facility] ER 4/27/22 c/o SOB/CARL and Nonproductive Cough x1-2 days s/p failed outpt abx for PNA admitted for Acute Hypoxic Respiratory Failure. Patient significantly hypoxic on arrival satting mid 80s on RA unimproved significantly on ventimask/NRB intermittently placed on BIPAP then placed on HFNC 45L 70% FIO2 satting SPO2 90%. Pt  seen/examined. Feels much beter on HFNC. CXR +BL infiltrates. CTA Chest negative for PE but +BL GGO. COVID Negative RVP positive Influenza AH3. Patient unimproved on outpt or inpt abx. ABX dc'd. Patient updated on plan. Also discussed with emergency contact patient's son Ruddy Zimmer Jr via Telephone and daughter. ROS negative unless mentioned.   Meds in ED: Vanco, Zosyn, 500cc IVFB, Duoneb x3, KCL IV 10meq x3, Mag Sulfate 2g IV, Lasix 40mg IV  (27 Apr 2022 23:17)    ID consulted called on 5/10/22, hospital day #13.   patient was admitted for acute hypoxic respiratory failure due to viral PNA/ Influenza and possible superimposed bacterial PNA.    Antimicrobial course reviewed.  patient was given Zosyn and Vancomycin on 4/27/22 1 dose each.  then he was given Oseltamivir from 4/27/22 - 5/2/22.  Azithromycin was given from 4/29/22 - 5/3/22, a 5-day course.    Ceftriaxone was given from 4/29 - 4/30/22, then escalated to Zosyn from 5/1/22 - 5/8/22, a 7 day course.    patient has also been getting systemic steroids from 4/28/22 to current.     patient still with SOB on 5/10/22 and desaturated in the AM; ID consult was called, he was restarted on empiric antibiotics - Vancomycin and Zosyn    Per notes, PE was ruled out on admission CT scan 4/27/22, with signs of a diffuse PNA.    A repeat CT scan on 5/10/22:  No pulmonary embolism. Since 6 days ago:   New mucous plug occluding the bronchus intermedius and middle and right  lower lobar bronchi, middle lobar collapse, and pneumonia and/or aspiration in the lower lobes.  Stable groundglass in the upper lobes, compatible with sequela of prior pneumonia/acute lung injury that was present 2 weeks prior.    patient is seen and examined in the room.   On high flow oxygen.        Impression:  shortness of breath  recent influenza  WBC elevation  hypoxia  mucus plugging      Plan:  SOB likely from the mucus plugging  - pulmonary consult follow up appreciated.   - agree with pulmonary toileting.     - should also check a BNP - was elevated on admission, Both zosyn and systemic steroids can lead to fluid retention.     WBC is elevated   - likely from steroids  procalc levels are actually down trending.   - Procalcitonin, Serum: 0.10 ng/mL (05-10-22 @ 10:05)  Procalcitonin, Serum: 0.20 ng/mL (04-28-22 @ 06:17)    - AGREE with a 5 day course of ZOSYN Only. if cultures are negative, then stop and watch after 5 days.   MRSA PCR from nares was negative in this hospital course,  OFF VANCOMYCIN.       Continue oxygen for his hypoxia.     - follow up all outstanding cultures  - trend temperature and WBC curve  - repeat cultures from blood and all sources if febrile.

## 2022-05-12 NOTE — PROGRESS NOTE ADULT - ASSESSMENT
76M with PMHX MI/CAD, HTN, HLD, CHFrEF, ICM s/p AICD presents to [facility] ER c/o SOB/CARL and Nonproductive Cough x1-2 days s/p failed outpt abx for PNA admitted for Acute Hypoxic Respiratory Failure 2/2 Viral PNA/Influenza and possible superimposed bacterial PNA.    #Acute Hypoxic Respiratory Failure 2/2 multifocal viral PNA from Influenza A on underlying HFrEF now complicated by mucouos plugging possible secondary bacterial infection   - wean off HFNC as tolerated   - PE was ruled out on CTA, duplex - neg   - CT with mucous plugging with rt middle lobe collapse  - on zosyn per ID and pulm , f/u BC - 5days course pf zosyn per ID recs   - prone as tolerated, supportive care with IS, mucinex, tylenol, duonebs, Chest PT    # dysphagia - Oral thrush and GERD   start nystatin, ppi + maalox   monitor - if does not improve may need further work up     #Leukocytosis  - infectious with recent steroid use  -repeat  CBC  - management as above    #MI/CAD  #HTN/HLD  #Chronic CHFrEF  #ICM s/p AICD  - ASA + Plavix   - Amiodarone,  Coreg , Entresto  - Amlodipine,  Atorvastatin, - Lasix 20mg    DVT ppx - lovenox  Diet - DASH/TLC  Dispo - acute - ct monitor - anticipate - discharge home with home care on home Oxygen in 2-3 days   code/social - DNR/DNI       Cerebrovascular accident (CVA), unspecified mechanism

## 2022-05-13 LAB
-  AMIKACIN: SIGNIFICANT CHANGE UP
-  AMOXICILLIN/CLAVULANIC ACID: SIGNIFICANT CHANGE UP
-  AMPICILLIN/SULBACTAM: SIGNIFICANT CHANGE UP
-  AMPICILLIN: SIGNIFICANT CHANGE UP
-  AZTREONAM: SIGNIFICANT CHANGE UP
-  CEFAZOLIN: SIGNIFICANT CHANGE UP
-  CEFEPIME: SIGNIFICANT CHANGE UP
-  CEFOXITIN: SIGNIFICANT CHANGE UP
-  CEFTRIAXONE: SIGNIFICANT CHANGE UP
-  CIPROFLOXACIN: SIGNIFICANT CHANGE UP
-  ERTAPENEM: SIGNIFICANT CHANGE UP
-  GENTAMICIN: SIGNIFICANT CHANGE UP
-  IMIPENEM: SIGNIFICANT CHANGE UP
-  LEVOFLOXACIN: SIGNIFICANT CHANGE UP
-  MEROPENEM: SIGNIFICANT CHANGE UP
-  PIPERACILLIN/TAZOBACTAM: SIGNIFICANT CHANGE UP
-  TOBRAMYCIN: SIGNIFICANT CHANGE UP
-  TRIMETHOPRIM/SULFAMETHOXAZOLE: SIGNIFICANT CHANGE UP
CULTURE RESULTS: SIGNIFICANT CHANGE UP
METHOD TYPE: SIGNIFICANT CHANGE UP
ORGANISM # SPEC MICROSCOPIC CNT: SIGNIFICANT CHANGE UP
ORGANISM # SPEC MICROSCOPIC CNT: SIGNIFICANT CHANGE UP
SPECIMEN SOURCE: SIGNIFICANT CHANGE UP

## 2022-05-13 PROCEDURE — 99233 SBSQ HOSP IP/OBS HIGH 50: CPT

## 2022-05-13 PROCEDURE — 99232 SBSQ HOSP IP/OBS MODERATE 35: CPT

## 2022-05-13 RX ORDER — FLUCONAZOLE 150 MG/1
150 TABLET ORAL DAILY
Refills: 0 | Status: DISCONTINUED | OUTPATIENT
Start: 2022-05-13 | End: 2022-05-18

## 2022-05-13 RX ADMIN — Medication 10 MILLILITER(S): at 05:06

## 2022-05-13 RX ADMIN — Medication 500000 UNIT(S): at 23:27

## 2022-05-13 RX ADMIN — Medication 250 MILLIGRAM(S): at 17:31

## 2022-05-13 RX ADMIN — AMIODARONE HYDROCHLORIDE 200 MILLIGRAM(S): 400 TABLET ORAL at 05:05

## 2022-05-13 RX ADMIN — ENOXAPARIN SODIUM 40 MILLIGRAM(S): 100 INJECTION SUBCUTANEOUS at 05:06

## 2022-05-13 RX ADMIN — PIPERACILLIN AND TAZOBACTAM 25 GRAM(S): 4; .5 INJECTION, POWDER, LYOPHILIZED, FOR SOLUTION INTRAVENOUS at 23:28

## 2022-05-13 RX ADMIN — CARVEDILOL PHOSPHATE 6.25 MILLIGRAM(S): 80 CAPSULE, EXTENDED RELEASE ORAL at 17:31

## 2022-05-13 RX ADMIN — Medication 500000 UNIT(S): at 05:04

## 2022-05-13 RX ADMIN — PANTOPRAZOLE SODIUM 40 MILLIGRAM(S): 20 TABLET, DELAYED RELEASE ORAL at 05:08

## 2022-05-13 RX ADMIN — Medication 40 MILLIGRAM(S): at 13:55

## 2022-05-13 RX ADMIN — ALBUTEROL 2 PUFF(S): 90 AEROSOL, METERED ORAL at 05:04

## 2022-05-13 RX ADMIN — ALBUTEROL 2 PUFF(S): 90 AEROSOL, METERED ORAL at 14:13

## 2022-05-13 RX ADMIN — Medication 500000 UNIT(S): at 11:41

## 2022-05-13 RX ADMIN — SENNA PLUS 2 TABLET(S): 8.6 TABLET ORAL at 23:27

## 2022-05-13 RX ADMIN — SACUBITRIL AND VALSARTAN 1 TABLET(S): 24; 26 TABLET, FILM COATED ORAL at 17:31

## 2022-05-13 RX ADMIN — Medication 40 MILLIGRAM(S): at 23:27

## 2022-05-13 RX ADMIN — PIPERACILLIN AND TAZOBACTAM 25 GRAM(S): 4; .5 INJECTION, POWDER, LYOPHILIZED, FOR SOLUTION INTRAVENOUS at 13:54

## 2022-05-13 RX ADMIN — ATORVASTATIN CALCIUM 40 MILLIGRAM(S): 80 TABLET, FILM COATED ORAL at 23:26

## 2022-05-13 RX ADMIN — PIPERACILLIN AND TAZOBACTAM 25 GRAM(S): 4; .5 INJECTION, POWDER, LYOPHILIZED, FOR SOLUTION INTRAVENOUS at 05:07

## 2022-05-13 RX ADMIN — FLUCONAZOLE 150 MILLIGRAM(S): 150 TABLET ORAL at 14:12

## 2022-05-13 RX ADMIN — AMLODIPINE BESYLATE 5 MILLIGRAM(S): 2.5 TABLET ORAL at 05:06

## 2022-05-13 RX ADMIN — CARVEDILOL PHOSPHATE 6.25 MILLIGRAM(S): 80 CAPSULE, EXTENDED RELEASE ORAL at 05:05

## 2022-05-13 RX ADMIN — Medication 81 MILLIGRAM(S): at 11:40

## 2022-05-13 RX ADMIN — CLOPIDOGREL BISULFATE 75 MILLIGRAM(S): 75 TABLET, FILM COATED ORAL at 11:41

## 2022-05-13 RX ADMIN — Medication 250 MILLIGRAM(S): at 05:05

## 2022-05-13 RX ADMIN — Medication 500000 UNIT(S): at 17:32

## 2022-05-13 RX ADMIN — Medication 5 MILLIGRAM(S): at 23:27

## 2022-05-13 RX ADMIN — Medication 40 MILLIGRAM(S): at 05:04

## 2022-05-13 RX ADMIN — SACUBITRIL AND VALSARTAN 1 TABLET(S): 24; 26 TABLET, FILM COATED ORAL at 05:05

## 2022-05-13 NOTE — PROGRESS NOTE ADULT - ASSESSMENT
This 76M with PMHX MI/CAD, HTN, HLD, CHFrEF, ICM s/p AICD presents to [facility] ER 4/27/22 c/o SOB/CARL and Nonproductive Cough x1-2 days s/p failed outpt abx for PNA admitted for Acute Hypoxic Respiratory Failure. Patient significantly hypoxic on arrival satting mid 80s on RA unimproved significantly on ventimask/NRB intermittently placed on BIPAP then placed on HFNC 45L 70% FIO2 satting SPO2 90%. Pt  seen/examined. Feels much beter on HFNC. CXR +BL infiltrates. CTA Chest negative for PE but +BL GGO. COVID Negative RVP positive Influenza AH3. Patient unimproved on outpt or inpt abx. ABX dc'd. Patient updated on plan. Also discussed with emergency contact patient's son Ruddy Zimmer Jr via Telephone and daughter. ROS negative unless mentioned.   Meds in ED: Vanco, Zosyn, 500cc IVFB, Duoneb x3, KCL IV 10meq x3, Mag Sulfate 2g IV, Lasix 40mg IV  (27 Apr 2022 23:17)    ID consulted called on 5/10/22, hospital day #13.   patient was admitted for acute hypoxic respiratory failure due to viral PNA/ Influenza and possible superimposed bacterial PNA.    Antimicrobial course reviewed.  patient was given Zosyn and Vancomycin on 4/27/22 1 dose each.  then he was given Oseltamivir from 4/27/22 - 5/2/22.  Azithromycin was given from 4/29/22 - 5/3/22, a 5-day course.    Ceftriaxone was given from 4/29 - 4/30/22, then escalated to Zosyn from 5/1/22 - 5/8/22, a 7 day course.    patient has also been getting systemic steroids from 4/28/22 to current.     patient still with SOB on 5/10/22 and desaturated in the AM; ID consult was called, he was restarted on empiric antibiotics - Vancomycin and Zosyn    Per notes, PE was ruled out on admission CT scan 4/27/22, with signs of a diffuse PNA.    A repeat CT scan on 5/10/22:  No pulmonary embolism. Since 6 days ago:   New mucous plug occluding the bronchus intermedius and middle and right  lower lobar bronchi, middle lobar collapse, and pneumonia and/or aspiration in the lower lobes.  Stable groundglass in the upper lobes, compatible with sequela of prior pneumonia/acute lung injury that was present 2 weeks prior.    patient is seen and examined in the room.   On high flow oxygen.        Impression:  shortness of breath  recent influenza  WBC elevation  hypoxia  mucus plugging      Plan:  SOB likely from the mucus plugging  - pulmonary consult follow up appreciated.   - agree with pulmonary toileting.   - Klebsiella oxytoca in the sputum culture;     Clinically better    WBC is elevated   WBC Count: 23.28 K/uL (05-10-22 @ 10:05)  WBC Count: 20.19 K/uL (05-09-22 @ 05:55)  WBC Count: 21.30 K/uL (05-05-22 @ 07:27)  WBC Count: 20.83 K/uL (05-04-22 @ 06:04)  WBC Count: 21.76 K/uL (05-03-22 @ 05:42)  WBC Count: 16.23 K/uL (05-02-22 @ 06:14)  WBC Count: 15.89 K/uL (05-01-22 @ 07:29)  WBC Count: 22.87 K/uL (04-29-22 @ 06:26)  WBC Count: 15.45 K/uL (04-28-22 @ 06:17)  WBC Count: 15.29 K/uL (04-27-22 @ 13:09)      - likely from steroids  procalc levels are actually down trending.   - Procalcitonin, Serum: 0.10 ng/mL (05-10-22 @ 10:05)  Procalcitonin, Serum: 0.20 ng/mL (04-28-22 @ 06:17)    - AGREE with a 5 day course of ZOSYN Only. if cultures are negative, then stop and watch after 5 days.   MRSA PCR from nares was negative in this hospital course,  OFF VANCOMYCIN.       Continue oxygen for his hypoxia.   - down trending      Please call me back if I may be of further assistance.   Thank you.

## 2022-05-13 NOTE — PROGRESS NOTE ADULT - ASSESSMENT
76M with PMHX MI/CAD, HTN, HLD, CHFrEF, ICM s/p AICD presents to [facility] ER c/o SOB/CARL and Nonproductive Cough x1-2 days s/p failed outpt abx for PNA admitted for Acute Hypoxic Respiratory Failure 2/2 Viral PNA/Influenza and possible superimposed bacterial PNA.    #Acute Hypoxic Respiratory Failure 2/2 multifocal viral PNA from Influenza A on underlying HFrEF now complicated by mucouos plugging possible secondary bacterial infection   - wean off oxygen as tolerated, suspect he will need prolonged O2 therapy at home.   - PE was ruled out on CTA, duplex - neg   - CT with mucous plugging with rt middle lobe collapse  - on zosyn per ID and pulm , f/u BC - 5days course of zosyn if BC neg  - prone as tolerated, supportive care with IS, mucinex, tylenol, duonebs, Chest PT    # dysphagia - Oral thrush and GERD   start nystatin, fluconazole. ppi + maalox   monitor - if does not improve may need further work up     #Leukocytosis  - infectious with recent steroid use  -repeat  CBC  - management as above    #MI/CAD  #HTN/HLD  #Chronic CHFrEF  #ICM s/p AICD  - ASA + Plavix   - Amiodarone,  Coreg , Entresto  - Amlodipine,  Atorvastatin, - Lasix 20mg    DVT ppx - lovenox  Diet - DASH/TLC  Dispo - acute - ct monitor - anticipate - discharge home with home care on home Oxygen in 1-2 days   code/social - DNR/DNI

## 2022-05-13 NOTE — CHART NOTE - NSCHARTNOTEFT_GEN_A_CORE
Source: Patient [x]  Family [ ]   other [ ]    Current Diet:   Diet, DASH/TLC:   Sodium & Cholesterol Restricted  Supplement Feeding Modality:  Oral  Ensure Enlive Cans or Servings Per Day:  1       Frequency:  Three Times a day (05-01-22 @ 11:26)    Patient reports [ ] nausea  [ ] vomiting [ ] diarrhea [ ] constipation  [ ]chewing problems [ x] swallowing issues  [ ] other:  2/2 thrush and GERD    PO intake:  < 50% [ ]   50-75%  [ x]   %  [ ]  other :    Source for PO intake [x ] Patient [ ] family [ ] chart [ ] staff [ ] other    Current Weight:   (5/5) 165 lbs RD bedscale weight  (5/2) 155 lbs  (4/27)  154.9 lbs     % Weight Change: No recent weight documented, bedscale weight appears inaccurate    Pertinent Medications: MEDICATIONS  (STANDING):  ALBUTerol    90 MICROgram(s) HFA Inhaler 2 Puff(s) Inhalation every 6 hours  aMIOdarone    Tablet 200 milliGRAM(s) Oral daily  amLODIPine   Tablet 5 milliGRAM(s) Oral daily  aspirin enteric coated 81 milliGRAM(s) Oral daily  atorvastatin 40 milliGRAM(s) Oral at bedtime  carvedilol 6.25 milliGRAM(s) Oral every 12 hours  clopidogrel Tablet 75 milliGRAM(s) Oral daily  enoxaparin Injectable 40 milliGRAM(s) SubCutaneous every 24 hours  furosemide    Tablet 20 milliGRAM(s) Oral daily  methylPREDNISolone sodium succinate Injectable 40 milliGRAM(s) IV Push every 8 hours  nystatin    Suspension 793104 Unit(s) Swish and Swallow four times a day  pantoprazole    Tablet 40 milliGRAM(s) Oral before breakfast  piperacillin/tazobactam IVPB.. 3.375 Gram(s) IV Intermittent every 8 hours  saccharomyces boulardii 250 milliGRAM(s) Oral two times a day  sacubitril 24 mG/valsartan 26 mG 1 Tablet(s) Oral two times a day  senna 2 Tablet(s) Oral at bedtime    MEDICATIONS  (PRN):  acetaminophen     Tablet .. 650 milliGRAM(s) Oral every 6 hours PRN Temp greater or equal to 38C (100.4F), Mild Pain (1 - 3)  ALBUTerol    0.083% 2.5 milliGRAM(s) Nebulizer every 2 hours PRN Shortness of Breath and/or Wheezing  aluminum hydroxide/magnesium hydroxide/simethicone Suspension 30 milliLiter(s) Oral every 4 hours PRN Dyspepsia  guaifenesin/dextromethorphan Oral Liquid 10 milliLiter(s) Oral every 6 hours PRN Cough  melatonin 5 milliGRAM(s) Oral at bedtime PRN Insomnia  ondansetron Injectable 4 milliGRAM(s) IV Push every 8 hours PRN Nausea and/or Vomiting    Pertinent Labs: No recent nutrition-related labs     Skin: skin tear R elbow     Nutrition focused physical exam conducted - found signs of malnutrition [ ]absent [X ]present    Subcutaneous fat loss: [ X] Orbital fat pads region, [X ]Buccal fat region, [ ]Triceps region,  [ ]Ribs region    Muscle wasting: [X ]Temples region, [X ]Clavicle region, [ ]Shoulder region, [ ]Scapula region, [ ]Interosseous region,  [ ]thigh region, [ ]Calf region    Estimated Needs:   [ ] no change since previous assessment  [ ] recalculated:     Current Nutrition Diagnosis: Pt remains at high nutrition risk secondary to severe chronic malnutrition related to inability to meet sufficient protein-energy needs in setting of acute resp failure w/hypoxia 2/2 flu  as evidenced by meeting <75% EER >1 mo, 20lb (11%) wt loss x 4 mo, mod muscle/fat loss. Pt with slow improvement in appetite/PO intake, taking Ensure well. Aware of thrush, Pt reports tolerating most PO, completing menus daily. Pt slowly weaning off O2, currently on 5LNC.     Recommendations:   1) Rx MVI daily  2)  Encourage HBV protein sources   3) Monitor weights daily for trend/accuracy   4) Consider appetite stimulant    Monitoring and Evaluation:   [x ] PO intake [ ] Tolerance to diet prescription [X] Weights  [X] Follow up per protocol [X] Labs:

## 2022-05-14 PROCEDURE — 99233 SBSQ HOSP IP/OBS HIGH 50: CPT

## 2022-05-14 RX ADMIN — PIPERACILLIN AND TAZOBACTAM 25 GRAM(S): 4; .5 INJECTION, POWDER, LYOPHILIZED, FOR SOLUTION INTRAVENOUS at 21:44

## 2022-05-14 RX ADMIN — Medication 250 MILLIGRAM(S): at 05:56

## 2022-05-14 RX ADMIN — CLOPIDOGREL BISULFATE 75 MILLIGRAM(S): 75 TABLET, FILM COATED ORAL at 11:23

## 2022-05-14 RX ADMIN — CARVEDILOL PHOSPHATE 6.25 MILLIGRAM(S): 80 CAPSULE, EXTENDED RELEASE ORAL at 05:56

## 2022-05-14 RX ADMIN — FLUCONAZOLE 150 MILLIGRAM(S): 150 TABLET ORAL at 11:25

## 2022-05-14 RX ADMIN — SACUBITRIL AND VALSARTAN 1 TABLET(S): 24; 26 TABLET, FILM COATED ORAL at 18:15

## 2022-05-14 RX ADMIN — Medication 500000 UNIT(S): at 18:16

## 2022-05-14 RX ADMIN — SENNA PLUS 2 TABLET(S): 8.6 TABLET ORAL at 21:44

## 2022-05-14 RX ADMIN — ATORVASTATIN CALCIUM 40 MILLIGRAM(S): 80 TABLET, FILM COATED ORAL at 21:55

## 2022-05-14 RX ADMIN — Medication 250 MILLIGRAM(S): at 18:15

## 2022-05-14 RX ADMIN — Medication 20 MILLIGRAM(S): at 05:56

## 2022-05-14 RX ADMIN — CARVEDILOL PHOSPHATE 6.25 MILLIGRAM(S): 80 CAPSULE, EXTENDED RELEASE ORAL at 18:15

## 2022-05-14 RX ADMIN — ENOXAPARIN SODIUM 40 MILLIGRAM(S): 100 INJECTION SUBCUTANEOUS at 05:56

## 2022-05-14 RX ADMIN — Medication 40 MILLIGRAM(S): at 21:44

## 2022-05-14 RX ADMIN — Medication 81 MILLIGRAM(S): at 11:22

## 2022-05-14 RX ADMIN — SACUBITRIL AND VALSARTAN 1 TABLET(S): 24; 26 TABLET, FILM COATED ORAL at 05:56

## 2022-05-14 RX ADMIN — PANTOPRAZOLE SODIUM 40 MILLIGRAM(S): 20 TABLET, DELAYED RELEASE ORAL at 05:58

## 2022-05-14 RX ADMIN — Medication 500000 UNIT(S): at 05:58

## 2022-05-14 RX ADMIN — PIPERACILLIN AND TAZOBACTAM 25 GRAM(S): 4; .5 INJECTION, POWDER, LYOPHILIZED, FOR SOLUTION INTRAVENOUS at 05:57

## 2022-05-14 RX ADMIN — PIPERACILLIN AND TAZOBACTAM 25 GRAM(S): 4; .5 INJECTION, POWDER, LYOPHILIZED, FOR SOLUTION INTRAVENOUS at 18:16

## 2022-05-14 RX ADMIN — Medication 40 MILLIGRAM(S): at 18:15

## 2022-05-14 RX ADMIN — AMIODARONE HYDROCHLORIDE 200 MILLIGRAM(S): 400 TABLET ORAL at 05:57

## 2022-05-14 RX ADMIN — AMLODIPINE BESYLATE 5 MILLIGRAM(S): 2.5 TABLET ORAL at 05:57

## 2022-05-14 RX ADMIN — Medication 500000 UNIT(S): at 11:24

## 2022-05-14 RX ADMIN — Medication 40 MILLIGRAM(S): at 05:57

## 2022-05-14 RX ADMIN — Medication 5 MILLIGRAM(S): at 21:52

## 2022-05-14 RX ADMIN — ALBUTEROL 2 PUFF(S): 90 AEROSOL, METERED ORAL at 21:53

## 2022-05-14 RX ADMIN — Medication 10 MILLILITER(S): at 10:38

## 2022-05-14 NOTE — PROGRESS NOTE ADULT - ASSESSMENT
76M with PMHX MI/CAD, HTN, HLD, CHFrEF, ICM s/p AICD presents to [facility] ER c/o SOB/CARL and Nonproductive Cough x1-2 days s/p failed outpt abx for PNA admitted for Acute Hypoxic Respiratory Failure 2/2 Viral PNA/Influenza and possible superimposed bacterial PNA.    #Acute Hypoxic Respiratory Failure 2/2 multifocal viral PNA from Influenza A on underlying HFrEF now complicated by mucouos plugging possible secondary bacterial infection   - wean off oxygen as tolerated, suspect he will need prolonged O2 therapy at home.   - PE was ruled out on CTA, duplex - neg   - CT with mucous plugging with rt middle lobe collapse  - on zosyn per ID and pulm. Blood cx- NGTD. IV Zosyn D5/5  - prone as tolerated, supportive care with IS, mucinex, tylenol, duonebs, Chest PT    # dysphagia - Oral thrush and GERD   start nystatin, fluconazole. ppi + maalox   Given no improvement, will consult GI for further workup    #Leukocytosis  - infectious with recent steroid use  -repeat  CBC  - management as above    #MI/CAD  #HTN/HLD  #Chronic CHFrEF  #ICM s/p AICD  - ASA + Plavix   - Amiodarone,  Coreg , Entresto  - Amlodipine,  Atorvastatin, - Lasix 20mg    DVT ppx - lovenox  Diet - DASH/TLC  Dispo - Pending GI evaluation - discharge home with home care on home Oxygen in 1-2 days   code/social - DNR/DNI

## 2022-05-15 LAB
ALBUMIN SERPL ELPH-MCNC: 2.6 G/DL — LOW (ref 3.3–5.2)
ALP SERPL-CCNC: 107 U/L — SIGNIFICANT CHANGE UP (ref 40–120)
ALT FLD-CCNC: 36 U/L — SIGNIFICANT CHANGE UP
ANION GAP SERPL CALC-SCNC: 9 MMOL/L — SIGNIFICANT CHANGE UP (ref 5–17)
AST SERPL-CCNC: 15 U/L — SIGNIFICANT CHANGE UP
BILIRUB SERPL-MCNC: 0.4 MG/DL — SIGNIFICANT CHANGE UP (ref 0.4–2)
BUN SERPL-MCNC: 31.4 MG/DL — HIGH (ref 8–20)
CALCIUM SERPL-MCNC: 8.8 MG/DL — SIGNIFICANT CHANGE UP (ref 8.6–10.2)
CHLORIDE SERPL-SCNC: 102 MMOL/L — SIGNIFICANT CHANGE UP (ref 98–107)
CO2 SERPL-SCNC: 27 MMOL/L — SIGNIFICANT CHANGE UP (ref 22–29)
CREAT SERPL-MCNC: 0.74 MG/DL — SIGNIFICANT CHANGE UP (ref 0.5–1.3)
CULTURE RESULTS: SIGNIFICANT CHANGE UP
CULTURE RESULTS: SIGNIFICANT CHANGE UP
EGFR: 93 ML/MIN/1.73M2 — SIGNIFICANT CHANGE UP
GLUCOSE SERPL-MCNC: 151 MG/DL — HIGH (ref 70–99)
HCT VFR BLD CALC: 44.2 % — SIGNIFICANT CHANGE UP (ref 39–50)
HGB BLD-MCNC: 14.9 G/DL — SIGNIFICANT CHANGE UP (ref 13–17)
MCHC RBC-ENTMCNC: 33.3 PG — SIGNIFICANT CHANGE UP (ref 27–34)
MCHC RBC-ENTMCNC: 33.7 GM/DL — SIGNIFICANT CHANGE UP (ref 32–36)
MCV RBC AUTO: 98.9 FL — SIGNIFICANT CHANGE UP (ref 80–100)
PLATELET # BLD AUTO: 179 K/UL — SIGNIFICANT CHANGE UP (ref 150–400)
POTASSIUM SERPL-MCNC: 4.6 MMOL/L — SIGNIFICANT CHANGE UP (ref 3.5–5.3)
POTASSIUM SERPL-SCNC: 4.6 MMOL/L — SIGNIFICANT CHANGE UP (ref 3.5–5.3)
PROT SERPL-MCNC: 4.9 G/DL — LOW (ref 6.6–8.7)
RBC # BLD: 4.47 M/UL — SIGNIFICANT CHANGE UP (ref 4.2–5.8)
RBC # FLD: 14.6 % — HIGH (ref 10.3–14.5)
SODIUM SERPL-SCNC: 138 MMOL/L — SIGNIFICANT CHANGE UP (ref 135–145)
SPECIMEN SOURCE: SIGNIFICANT CHANGE UP
SPECIMEN SOURCE: SIGNIFICANT CHANGE UP
WBC # BLD: 21.18 K/UL — HIGH (ref 3.8–10.5)
WBC # FLD AUTO: 21.18 K/UL — HIGH (ref 3.8–10.5)

## 2022-05-15 PROCEDURE — 99222 1ST HOSP IP/OBS MODERATE 55: CPT

## 2022-05-15 PROCEDURE — 99232 SBSQ HOSP IP/OBS MODERATE 35: CPT

## 2022-05-15 RX ADMIN — ATORVASTATIN CALCIUM 40 MILLIGRAM(S): 80 TABLET, FILM COATED ORAL at 22:24

## 2022-05-15 RX ADMIN — CARVEDILOL PHOSPHATE 6.25 MILLIGRAM(S): 80 CAPSULE, EXTENDED RELEASE ORAL at 17:02

## 2022-05-15 RX ADMIN — ENOXAPARIN SODIUM 40 MILLIGRAM(S): 100 INJECTION SUBCUTANEOUS at 06:10

## 2022-05-15 RX ADMIN — Medication 81 MILLIGRAM(S): at 11:25

## 2022-05-15 RX ADMIN — Medication 20 MILLIGRAM(S): at 06:09

## 2022-05-15 RX ADMIN — ALBUTEROL 2 PUFF(S): 90 AEROSOL, METERED ORAL at 22:00

## 2022-05-15 RX ADMIN — AMLODIPINE BESYLATE 5 MILLIGRAM(S): 2.5 TABLET ORAL at 06:09

## 2022-05-15 RX ADMIN — ALBUTEROL 2 PUFF(S): 90 AEROSOL, METERED ORAL at 13:30

## 2022-05-15 RX ADMIN — PIPERACILLIN AND TAZOBACTAM 25 GRAM(S): 4; .5 INJECTION, POWDER, LYOPHILIZED, FOR SOLUTION INTRAVENOUS at 06:09

## 2022-05-15 RX ADMIN — PIPERACILLIN AND TAZOBACTAM 25 GRAM(S): 4; .5 INJECTION, POWDER, LYOPHILIZED, FOR SOLUTION INTRAVENOUS at 13:30

## 2022-05-15 RX ADMIN — SACUBITRIL AND VALSARTAN 1 TABLET(S): 24; 26 TABLET, FILM COATED ORAL at 06:09

## 2022-05-15 RX ADMIN — SACUBITRIL AND VALSARTAN 1 TABLET(S): 24; 26 TABLET, FILM COATED ORAL at 17:02

## 2022-05-15 RX ADMIN — Medication 40 MILLIGRAM(S): at 06:10

## 2022-05-15 RX ADMIN — ALBUTEROL 2 PUFF(S): 90 AEROSOL, METERED ORAL at 07:49

## 2022-05-15 RX ADMIN — Medication 40 MILLIGRAM(S): at 22:25

## 2022-05-15 RX ADMIN — CLOPIDOGREL BISULFATE 75 MILLIGRAM(S): 75 TABLET, FILM COATED ORAL at 11:25

## 2022-05-15 RX ADMIN — AMIODARONE HYDROCHLORIDE 200 MILLIGRAM(S): 400 TABLET ORAL at 06:09

## 2022-05-15 RX ADMIN — SENNA PLUS 2 TABLET(S): 8.6 TABLET ORAL at 22:26

## 2022-05-15 RX ADMIN — PANTOPRAZOLE SODIUM 40 MILLIGRAM(S): 20 TABLET, DELAYED RELEASE ORAL at 06:09

## 2022-05-15 RX ADMIN — Medication 500000 UNIT(S): at 06:12

## 2022-05-15 RX ADMIN — Medication 500000 UNIT(S): at 11:25

## 2022-05-15 RX ADMIN — CARVEDILOL PHOSPHATE 6.25 MILLIGRAM(S): 80 CAPSULE, EXTENDED RELEASE ORAL at 06:09

## 2022-05-15 RX ADMIN — FLUCONAZOLE 150 MILLIGRAM(S): 150 TABLET ORAL at 11:25

## 2022-05-15 RX ADMIN — Medication 250 MILLIGRAM(S): at 17:02

## 2022-05-15 RX ADMIN — PIPERACILLIN AND TAZOBACTAM 25 GRAM(S): 4; .5 INJECTION, POWDER, LYOPHILIZED, FOR SOLUTION INTRAVENOUS at 22:25

## 2022-05-15 RX ADMIN — Medication 10 MILLILITER(S): at 11:26

## 2022-05-15 RX ADMIN — Medication 250 MILLIGRAM(S): at 06:10

## 2022-05-15 RX ADMIN — Medication 40 MILLIGRAM(S): at 13:33

## 2022-05-15 RX ADMIN — Medication 500000 UNIT(S): at 17:02

## 2022-05-15 RX ADMIN — Medication 10 MILLILITER(S): at 06:10

## 2022-05-15 NOTE — CONSULT NOTE ADULT - NS ATTEND AMEND GEN_ALL_CORE FT
77 yo M consult for dysphagia.  Admitted with pna. No prior EGD. Being treated for candida empirically--agree. Improving from dysphagia standpoint. Will need EGD, but given improvement in symptoms and current pna, likely better to perform as outpt. will sign off, please call with questions.

## 2022-05-15 NOTE — CONSULT NOTE ADULT - SUBJECTIVE AND OBJECTIVE BOX
PULMONARY CONSULT NOTE      GALI KELLEY  MRN-[id number]    Patient is a 76y old  Male who presents with a chief complaint of AHRF 2/2 Viral PNA / Influenza A r/o ARDS (28 Apr 2022 16:13)      HISTORY OF PRESENT ILLNESS:  76M PMH CAD s/p PI, HFrEF, ICM s/p AICD, HTN, HLD who presented on 4/27/22 with SOB, cough. He was given antibiotics as outpatient but did not improve. In the ED was hypoxic to mid 80s% on room air and required NRB, BPAP and then to HFNC. COVID PCR negative but positive for Influenza A. He reports his family members are also sick but they have improved. He reports some SOB at this time but improved overall. No N/V/D. No palpitations, does have occasional chest pain.    MEDICATIONS  (STANDING):  albuterol/ipratropium for Nebulization 3 milliLiter(s) Nebulizer every 6 hours  aMIOdarone    Tablet 200 milliGRAM(s) Oral daily  amLODIPine   Tablet 5 milliGRAM(s) Oral daily  aspirin enteric coated 81 milliGRAM(s) Oral daily  atorvastatin 40 milliGRAM(s) Oral at bedtime  carvedilol 6.25 milliGRAM(s) Oral every 12 hours  clopidogrel Tablet 75 milliGRAM(s) Oral daily  enoxaparin Injectable 40 milliGRAM(s) SubCutaneous every 24 hours  furosemide    Tablet 20 milliGRAM(s) Oral daily  oseltamivir 75 milliGRAM(s) Oral two times a day  sacubitril 49 mG/valsartan 51 mG 1 Tablet(s) Oral two times a day    MEDICATIONS  (PRN):  acetaminophen     Tablet .. 650 milliGRAM(s) Oral every 6 hours PRN Temp greater or equal to 38C (100.4F), Mild Pain (1 - 3)  ALBUTerol    0.083% 2.5 milliGRAM(s) Nebulizer every 2 hours PRN Shortness of Breath and/or Wheezing  aluminum hydroxide/magnesium hydroxide/simethicone Suspension 30 milliLiter(s) Oral every 4 hours PRN Dyspepsia  melatonin 3 milliGRAM(s) Oral at bedtime PRN Insomnia  ondansetron Injectable 4 milliGRAM(s) IV Push every 8 hours PRN Nausea and/or Vomiting    Allergies    No Known Allergies    Intolerances      PAST MEDICAL & SURGICAL HISTORY:  HTN (hypertension)    High cholesterol    Ejection fraction &lt; 50%    AICD (automatic cardioverter/defibrillator) present    History of total hip replacement, right    S/P cholecystectomy    Abdominal hernia      FAMILY HISTORY:  No pertinent family history in first degree relatives          SOCIAL HISTORY  Smoking History:   Denies smoking history    REVIEW OF SYSTEMS:  CONSTITUTIONAL:  No fevers, chills  HEENT:  No headache  CARDIOVASCULAR:  Occasional chest pain.   RESPIRATORY:  As per HPI  GASTROINTESTINAL:  No abdominal pain, N/V/D  GENITOURINARY:  No dysuria, frequency or urgency  NEUROLOGIC:  No seizures or headaches  PSYCHIATRIC:  No disorder of thought or mood      Vital Signs Last 24 Hrs  T(C): 36.6 (28 Apr 2022 16:40), Max: 37.1 (27 Apr 2022 23:36)  T(F): 97.8 (28 Apr 2022 16:40), Max: 98.7 (27 Apr 2022 23:36)  HR: 56 (28 Apr 2022 16:40) (56 - 70)  BP: 100/63 (28 Apr 2022 16:40) (92/55 - 125/67)  BP(mean): --  RR: 18 (28 Apr 2022 16:40) (18 - 25)  SpO2: 89% (28 Apr 2022 16:40) (89% - 98%)      PHYSICAL EXAMINATION:  GENERAL: In no apparent distress  HEENT: NC/AT  NECK: Supple, non-tender   LUNGS: CTA B/L, good inspiratory effort, non-labored breathing  CV: +S1, S2, RRR  ABDOMEN: Soft, non-tender  EXTREMITIES: No pedal edema B/L  SKIN: No open wounds  NEUROLOGIC: Grossly non-focal  PSYCH: Normal affect      LABS:                        15.1   15.45 )-----------( 178      ( 28 Apr 2022 06:17 )             43.4     04-28    142  |  102  |  18.7  ----------------------------<  146<H>  2.6<LL>   |  25.0  |  0.74    Ca    8.3<L>      28 Apr 2022 06:17  Phos  2.9     04-28  Mg     2.4     04-28    TPro  5.8<L>  /  Alb  2.7<L>  /  TBili  0.8  /  DBili  x   /  AST  44<H>  /  ALT  48<H>  /  AlkPhos  80  04-28          CARDIAC MARKERS ( 28 Apr 2022 06:17 )  x     / <0.01 ng/mL / 162 U/L / x     / 1.8 ng/mL  CARDIAC MARKERS ( 27 Apr 2022 13:09 )  x     / <0.01 ng/mL / x     / x     / x            Serum Pro-Brain Natriuretic Peptide: 1263 pg/mL (04-27-22 @ 13:09)      Procalcitonin, Serum: 0.20 ng/mL (04-28-22 @ 06:17)      MICROBIOLOGY:  Respiratory Viral Panel with COVID-19 by DELFINO (04.27.22 @ 13:08)    Rapid RVP Result: Detected    SARS-CoV-2: NotDete: This Respiratory Panel uses polymerase chain reaction (PCR) to detect for  adenovirus; coronavirus (HKU1, NL63, 229E, OC43); human metapneumovirus  (hMPV); human enterovirus/rhinovirus (Entero/RV); influenza A; influenza  A/H1; influenza A/H3; influenza A/H1-2009; influenza B; parainfluenza  viruses 1, 2, 3, 4; respiratory syncytial virus; Mycoplasma pneumoniae;  Chlamydophila pneumoniae; and SARS-CoV-2.          RADIOLOGY & ADDITIONAL STUDIES:  < from: CT Angio Chest PE Protocol w/ IV Cont (04.27.22 @ 16:25) >    ACC: [id number] [facility]                          PROCEDURE DATE:  04/27/2022          INTERPRETATION:  Reason for Exam: Shortness of breath. chest pain.    CTA of the chest was performed from the thoracic inlet to the level of   the adrenal glands following IV contrast injection of  80 cc of Omnipaque   350. No immediate complications were reported.  MIP images were also   created and reviewed.    Comparison: CT chest abdomen and pelvis dated June 9, 2013    Tubes/Lines: None    Mediastinum and Heart: Aorta and pulmonary arteries are normal in size.   Thyroid gland is unremarkable. No lymphadenopathy. No pericardial   effusion. Dilated left ventricle with thinning of the lateral wall   suggestive of prior circumflex territory infarct.    Lungs, Pleura, and Airways: There is no pulmonary embolus. Patchy   groundglass opacities noted throughout both lungs    Visualized Abdomen: Unremarkable.    Bones and soft tissues: Unremarkable.    IMPRESSION:    No pulmonaryembolus.    Patchy bilateral groundglass opacities which have the appearance of COVID   19 pneumonia. Correlate with PCR examination.    Remaining incidental findings as above.    --- End of Report ---            IVETTE GARCIA MD; Attending Radiologist  This document has been electronically signed. Apr 27 2022  4:40PM    < end of copied text >      ECHO:  
                                           [facility]                                                INFECTIOUS DISEASES  =======================================================                               Tyron Concepcion MD#  Shay Low MD*                                     Angela Gloria MD*    Morenita Khalil MD*            Diplomates American Board of Internal Medicine & Infectious Diseases                  # [place] Office - Appt - Tel  [phone] Fax [phone]                * [place] Office - Appt - Tel [phone] Fax [phone]                                  Hospital Consult line:  [phone]  =======================================================  MRN-[id number]  GALI ZIMMER   HPI:  This 76M with PMHX MI/CAD, HTN, HLD, CHFrEF, ICM s/p AICD presents to [facility] ER 4/27/22 c/o SOB/CARL and Nonproductive Cough x1-2 days s/p failed outpt abx for PNA admitted for Acute Hypoxic Respiratory Failure. Patient significantly hypoxic on arrival satting mid 80s on RA unimproved significantly on ventimask/NRB intermittently placed on BIPAP then placed on HFNC 45L 70% FIO2 satting SPO2 90%. Pt  seen/examined. Feels much beter on HFNC. CXR +BL infiltrates. CTA Chest negative for PE but +BL GGO. COVID Negativ. RVP positive Influenza AH3. Patient unimproved on outpt or inpt abx. ABX dc'd. Patient updated on plan. Also discussed with emergency contact patient's son Gali Zimmer Jr via Telephone and daughter. ROS negative unless mentioned.   Meds in ED: Vanco, Zosyn, 500cc IVFB, Duoneb x3, KCL IV 10meq x3, Mag Sulfate 2g IV, Lasix 40mg IV  (27 Apr 2022 23:17)    ID consulted called on 5/10/22, hospital day #13.   patient was admitted for acute hypoxic respiratory failure due to viral PNA/Influenza and possible superimposed bacterial PNA.    Antimicrobial course reviewed.  patient was given Zosyn and Vancomycin on 4/27/22 1 dose each.  then he was given Oseltamivir from 4/27/22 - 5/2/22.  Azithromycin was given from 4/29/22 - 5/3/22, a 5-day course.    Ceftriaxone was given from 4/29 - 4/30/22, then escalated to Zosyn from 5/1/22 - 5/8/22, a 7 day course.    patient has also been getting systemic steroids from 4/28/22 to current.     patient still with SOB on 5/10/22 and desaturated in the AM; ID consult was called, he was restarted on empiric antibotics - Vancomycin and Zosyn    Per notes, PE was ruled out on admission CT scan 4/27/22, with signs of a diffuse PNA.    A repeat CT scan on 5/10/22:  No pulmonary embolism. Since 6 days ago:   New mucous plug occluding the bronchus intermedius and middle and right  lower lobar bronchi, middle lobar collapse, and pneumonia and/or aspiration in the lower lobes.  Stable groundglass in the upper lobes, compatible with sequela of prior pneumonia/acute lung injury that was present 2 weeks prior.    patient is seen and examined in the room.   On high flow oxygen.      I have personally reviewed the labs and data; pertinent labs and data are listed in this note; please see below.   =======================================================  Past Medical & Surgical Hx:  =====================  PAST MEDICAL & SURGICAL HISTORY:  HTN (hypertension)  High cholesterol  Ejection fraction &lt; 50%  AICD (automatic cardioverter/defibrillator) present  History of total hip replacement, right  S/P cholecystectomy  Abdominal hernia    Problem List:  ==========  HEALTH ISSUES - PROBLEM Dx:  Ejection fraction &lt; 50%  Pulmonary infiltrates  Acute respiratory failure with hypoxia  Mucus plugging of bronchi  Pneumonia due to influenza A virus     Social Hx:  =======  no toxic habits currently  former smoker of tobacco and marijuana  denies IVDU    FAMILY HISTORY:  No pertinent family history in first degree relatives    no significant family history of immunosuppressive disorders in mother or father   =======================================================    REVIEW OF SYSTEMS:  CONSTITUTIONAL:  No Fever or chills  HEENT:  No diplopia or blurred vision.  No earache, sore throat or runny nose.  CARDIOVASCULAR:  No pressure, squeezing, strangling, tightness, heaviness or aching about the chest, neck, axilla or epigastrium.  RESPIRATORY:  + shortness of breath  GASTROINTESTINAL:  No nausea, vomiting or diarrhea.  GENITOURINARY:  No dysuria, frequency or urgency. No Blood in urine  MUSCULOSKELETAL:  no joint aches, no muscle pain  SKIN:  No change in skin, hair or nails.  NEUROLOGIC:  No Headaches, seizures or weakness.  PSYCHIATRIC:  No disorder of thought or mood.  ENDOCRINE:  No heat or cold intolerance  HEMATOLOGICAL:  No easy bruising or bleeding.    =======================================================  Allergies  No Known Allergies    Antibiotics:  piperacillin/tazobactam IVPB. 3.375 Gram(s) IV Intermittent once  piperacillin/tazobactam IVPB.. 3.375 Gram(s) IV Intermittent every 8 hours    Other medications:  acetylcysteine 20%  Inhalation 4 milliLiter(s) Inhalation every 6 hours  ALBUTerol    0.083% 2.5 milliGRAM(s) Nebulizer every 6 hours  aMIOdarone    Tablet 200 milliGRAM(s) Oral daily  amLODIPine   Tablet 5 milliGRAM(s) Oral daily  aspirin enteric coated 81 milliGRAM(s) Oral daily  atorvastatin 40 milliGRAM(s) Oral at bedtime  carvedilol 6.25 milliGRAM(s) Oral every 12 hours  clopidogrel Tablet 75 milliGRAM(s) Oral daily  enoxaparin Injectable 40 milliGRAM(s) SubCutaneous every 24 hours  furosemide    Tablet 20 milliGRAM(s) Oral daily  methylPREDNISolone sodium succinate Injectable 40 milliGRAM(s) IV Push every 8 hours  predniSONE   Tablet 20 milliGRAM(s) Oral daily  saccharomyces boulardii 250 milliGRAM(s) Oral two times a day  sacubitril 24 mG/valsartan 26 mG 1 Tablet(s) Oral two times a day  senna 2 Tablet(s) Oral at bedtime      Abx Hx:  ======  piperacillin/tazobactam IVPB...   200 mL/Hr IV Intermittent (04-27-22 @ 19:15)  vancomycin  IVPB.   250 mL/Hr IV Intermittent (04-27-22 @ 20:33)    oseltamivir   75 milliGRAM(s) Oral (04-27-22 @ 20:33)   75 milliGRAM(s) Oral (04-28-22 @ 05:30)   75 milliGRAM(s) Oral (04-28-22 @ 16:32)   75 milliGRAM(s) Oral (04-29-22 @ 05:46)   75 milliGRAM(s) Oral (04-29-22 @ 17:37)   75 milliGRAM(s) Oral (04-30-22 @ 05:24)   75 milliGRAM(s) Oral (04-30-22 @ 17:51)   75 milliGRAM(s) Oral (05-01-22 @ 05:10)   75 milliGRAM(s) Oral (05-01-22 @ 17:14)   75 milliGRAM(s) Oral (05-02-22 @ 05:25)   75 milliGRAM(s) Oral (05-02-22 @ 17:20)     azithromycin  IVPB   255 mL/Hr IV Intermittent (04-29-22 @ 17:38)   255 mL/Hr IV Intermittent (04-30-22 @ 17:48)   255 mL/Hr IV Intermittent (05-01-22 @ 15:14)   255 mL/Hr IV Intermittent (05-02-22 @ 17:20)   255 mL/Hr IV Intermittent (05-03-22 @ 16:28)    cefTRIAXone   IVPB   100 mL/Hr IV Intermittent (04-29-22 @ 17:38)   100 mL/Hr IV Intermittent (04-30-22 @ 17:48)    piperacillin/tazobactam IVPB.   200 mL/Hr IV Intermittent (05-01-22 @ 09:51)   25 mL/Hr IV Intermittent (05-01-22 @ 17:14)   25 mL/Hr IV Intermittent (05-02-22 @ 02:06)   25 mL/Hr IV Intermittent (05-02-22 @ 10:44)   25 mL/Hr IV Intermittent (05-02-22 @ 17:21)   25 mL/Hr IV Intermittent (05-03-22 @ 01:22)   25 mL/Hr IV Intermittent (05-03-22 @ 09:58)   25 mL/Hr IV Intermittent (05-03-22 @ 17:33)   25 mL/Hr IV Intermittent (05-04-22 @ 02:34)   25 mL/Hr IV Intermittent (05-04-22 @ 11:03)   25 mL/Hr IV Intermittent (05-04-22 @ 17:08)   25 mL/Hr IV Intermittent (05-05-22 @ 02:27)   25 mL/Hr IV Intermittent (05-05-22 @ 10:48)   25 mL/Hr IV Intermittent (05-05-22 @ 17:23)   25 mL/Hr IV Intermittent (05-06-22 @ 02:21)   25 mL/Hr IV Intermittent (05-06-22 @ 11:58)   25 mL/Hr IV Intermittent (05-06-22 @ 16:54)   25 mL/Hr IV Intermittent (05-07-22 @ 01:31)   25 mL/Hr IV Intermittent (05-07-22 @ 11:11)   25 mL/Hr IV Intermittent (05-07-22 @ 18:43)   25 mL/Hr IV Intermittent (05-08-22 @ 04:13)    vancomycin  IVPB   250 mL/Hr IV Intermittent (05-10-22 @ 10:32)      Steroids Course:   ===========  methylPREDNISolone sodium succinate Injectable   125 milliGRAM(s) (05-10-22 @ 02:15)    predniSONE   Tablet   20 milliGRAM(s) (05-10-22 @ 06:48)   20 milliGRAM(s) (05-09-22 @ 17:14)    methylPREDNISolone sodium succinate Injectable   40 milliGRAM(s) (05-09-22 @ 05:57)   40 milliGRAM(s) (05-08-22 @ 18:21)   40 milliGRAM(s) (05-08-22 @ 06:16)   40 milliGRAM(s) (05-07-22 @ 17:40)   40 milliGRAM(s) (05-07-22 @ 05:22)   40 milliGRAM(s) (05-06-22 @ 16:54)   40 milliGRAM(s) (05-06-22 @ 11:58)   40 milliGRAM(s) (05-06-22 @ 02:21)   40 milliGRAM(s) (05-05-22 @ 17:23)   40 milliGRAM(s) (05-05-22 @ 10:47)   40 milliGRAM(s) (05-05-22 @ 02:27)   40 milliGRAM(s) (05-04-22 @ 17:07)   40 milliGRAM(s) (05-04-22 @ 11:03)   40 milliGRAM(s) (05-04-22 @ 02:34)   40 milliGRAM(s) (05-03-22 @ 17:39)   40 milliGRAM(s) (05-03-22 @ 09:55)   40 milliGRAM(s) (05-03-22 @ 01:22)   40 milliGRAM(s) (05-02-22 @ 17:20)   40 milliGRAM(s) (05-02-22 @ 10:44)   40 milliGRAM(s) (05-02-22 @ 02:04)   40 milliGRAM(s) (05-01-22 @ 17:14)   40 milliGRAM(s) (05-01-22 @ 09:50)     predniSONE   Tablet   40 milliGRAM(s) (05-01-22 @ 05:10)   40 milliGRAM(s) (04-30-22 @ 05:24)   40 milliGRAM(s) (04-29-22 @ 17:37)    methylPREDNISolone sodium succinate Injectable   40 milliGRAM(s) (04-28-22 @ 05:22)   40 milliGRAM(s) (04-27-22 @ 23:36)      ======================================================  Physical Exam:  ============  T(F): 97.5 (10 May 2022 11:20), Max: 98 (10 May 2022 04:22)  HR: 68 (10 May 2022 11:20)  BP: 104/66 (10 May 2022 11:20)  RR: 20 (10 May 2022 11:20)  SpO2: 92% (10 May 2022 11:20) (85% - 95%)  temp max in last 48H T(F): , Max: 98.1 (05-09-22 @ 04:41)    General:  No acute distress.  Eye: Pupils are equal, round and reactive to light, Extraocular movements are intact, Normal conjunctiva.  HENT: Normocephalic, Oral mucosa is moist, No pharyngeal erythema, No sinus tenderness.  Neck: Supple, No lymphadenopathy.  Respiratory: Lungs with poor aeration  + tachypnea  Cardiovascular: Normal rate, Regular rhythm,   Gastrointestinal: Soft, Non-tender, Non-distended, Normal bowel sounds.  Genitourinary: No costovertebral angle tenderness.  Lymphatics: No lymphadenopathy neck,   Musculoskeletal: Normal range of motion, Normal strength.  Integumentary: No rash.  Neurologic: Alert, Oriented, No focal deficits, Cranial Nerves II-XII are grossly intact.  Psychiatric: Appropriate mood & affect.    =======================================================  Labs:                        15.9   23.28 )-----------( 299      ( 10 May 2022 10:05 )             47.1     WBC Count: 23.28 K/uL (05-10-22 @ 10:05)  WBC Count: 20.19 K/uL (05-09-22 @ 05:55)      05-10    133<L>  |  101  |  35.4<H>  ----------------------------<  184<H>  5.3   |  21.0<L>  |  0.83    Ca    9.1      10 May 2022 10:05        Culture - Blood (collected 04-27-22 @ 19:53)  Source: .Blood Blood  Final Report (05-02-22 @ 21:01):    No growth at 5 days.    Culture - Blood (collected 04-27-22 @ 19:53)  Source: .Blood Blood  Final Report (05-02-22 @ 21:01):    No growth at 5 days.    Culture - Blood (collected 04-27-22 @ 13:18)  Source: .Blood Blood  Final Report (05-02-22 @ 15:00):    No growth at 5 days.    Culture - Blood (collected 04-27-22 @ 13:17)  Source: .Blood Blood  Final Report (05-02-22 @ 15:00):    No growth at 5 days.      Creatinine, Serum: 0.83 mg/dL (05-10-22 @ 10:05)  Creatinine, Serum: 0.67 mg/dL (05-09-22 @ 05:55)    C-Reactive Protein, Serum: 230 mg/L (04-28-22 @ 06:17)      Procalcitonin, Serum: 0.10 ng/mL (05-10-22 @ 10:05)  Procalcitonin, Serum: 0.20 ng/mL (04-28-22 @ 06:17)    SARS-CoV-2: NotDetec (05-10-22 @ 02:43)  COVID-19 PCR: NotDetec (05-01-22 @ 10:15)  SARS-CoV-2: NotDetec (04-27-22 @ 13:08)       ===============  < from: CT Angio Chest PE Protocol w/ IV Cont (05.10.22 @ 09:53) >    ACC: [id number] EXAM:  CT ANGIO CHEST PULM On license of [facility]                          PROCEDURE DATE:  05/10/2022        INTERPRETATION:  INDICATION: Pneumonia, CHF, sudden hypoxia, rule out pulmonary embolism    TECHNIQUE: Volumetric images of the chestwere obtained after the administration of 60 mL of Omnipaque 350. Maximum intensity projection images were generated.    COMPARISON: CT chest 5/4/2022.    FINDINGS:    PULMONARY ANGIOGRAM:  No pulmonary embolism. Normal caliber pulmonary artery.    LUNGS/AIRWAYS/PLEURA: New mucous plug occluding the bronchus intermedius and extending into the middle and right lower lobe bronchi, and multiple segmental bronchi in the right lower lobe. New middle lobar collapse and ill-defined opacitiesin the lower lobes. Stable diffuse groundglass in the upper lobes. Unremarkable pleura.    LYMPH NODES/MEDIASTINUM: No enlarged lymph nodes.    HEART/VASCULATURE: Cardiomegaly. No pericardial effusion. Stable 4.6 cm midascending aorta. Coronary artery calcifications. ICD lead in the right ventricle.    UPPER ABDOMEN: Stable few too small to characterize hypodensities in the liver. Cholecystectomy. Left renal cyst.    BONES/SOFT TISSUES: No acute abnormality.      IMPRESSION:  No pulmonary embolism.  Since 6 days ago:  New mucous plug occluding the bronchus intermedius and middle and right lower lobar bronchi, middle lobar collapse, and pneumonia and/or aspiration in the lower lobes.  Stable groundglass in the upper lobes, compatible with sequela of prior pneumonia/acute lung injury that was present 2 weeks prior.      --- End of Report ---      KRYSTAL SIM M.D., ATTENDING RADIOLOGIST  This document has been electronically signed. May 10 2022 10:07AM    < end of copied text >     
    Patient is a 76y old  Male who presents with a chief complaint of AHRF 2/2 Viral PNA / Influenza A r/o ARDS (14 May 2022 10:46)      HPI:  76M with PMHX MI/CAD, HTN, HLD, CHFrEF, ICM s/p AICD presents to [facility] ER c/o SOB/CARL and Nonproductive Cough x1-2 days prior to admission, s/p failed outpt abx for PNA admitted for Acute Hypoxic Respiratory Failure. Patient significantly hypoxic on arrival satting mid 80s on RA unimproved significantly on ventimask/NRB intermittently placed on BIPAP then placed on HFNC 45L 70% FIO2 satting SPO2 90%.CTA Chest negative for PE but +BL GGO. COVID Negativ. RVP positive Influenza AH3. Patient without improvement on outpatient and inpatient Abx course. ID following. CT of chest on 5/10 showed New mucous plug occluding the bronchus intermedius and middle and right lower lobar bronchi, middle lobar collapse, and pneumonia and/or aspiration in the lower lobes. GI consulted for dysphagia. Patient seen and evaluated at bedside, reporting no complaints. Reports he feels better this AM. Denies nausea, vomiting, abdominal pain, chest pain, shortness of breath, hematemesis, hematochezia, melena. Now Tolerating diet w/o much difficulty. Currently on Fluconazole treatment for oral thrush. Denies any prior EGD in the past.       PAST MEDICAL & SURGICAL HISTORY:  HTN (hypertension)      High cholesterol      Ejection fraction &lt; 50%      AICD (automatic cardioverter/defibrillator) present      History of total hip replacement, right      S/P cholecystectomy      Abdominal hernia          Allergies    No Known Allergies    Intolerances        MEDICATIONS  (STANDING):  ALBUTerol    90 MICROgram(s) HFA Inhaler 2 Puff(s) Inhalation every 6 hours  aMIOdarone    Tablet 200 milliGRAM(s) Oral daily  amLODIPine   Tablet 5 milliGRAM(s) Oral daily  aspirin enteric coated 81 milliGRAM(s) Oral daily  atorvastatin 40 milliGRAM(s) Oral at bedtime  carvedilol 6.25 milliGRAM(s) Oral every 12 hours  clopidogrel Tablet 75 milliGRAM(s) Oral daily  enoxaparin Injectable 40 milliGRAM(s) SubCutaneous every 24 hours  fluconAZOLE   Tablet 150 milliGRAM(s) Oral daily  furosemide    Tablet 20 milliGRAM(s) Oral daily  methylPREDNISolone sodium succinate Injectable 40 milliGRAM(s) IV Push every 8 hours  nystatin    Suspension [id number] Unit(s) Swish and Swallow four times a day  pantoprazole    Tablet 40 milliGRAM(s) Oral before breakfast  piperacillin/tazobactam IVPB.. 3.375 Gram(s) IV Intermittent every 8 hours  saccharomyces boulardii 250 milliGRAM(s) Oral two times a day  sacubitril 24 mG/valsartan 26 mG 1 Tablet(s) Oral two times a day  senna 2 Tablet(s) Oral at bedtime    MEDICATIONS  (PRN):  acetaminophen     Tablet .. 650 milliGRAM(s) Oral every 6 hours PRN Temp greater or equal to 38C (100.4F), Mild Pain (1 - 3)  ALBUTerol    0.083% 2.5 milliGRAM(s) Nebulizer every 2 hours PRN Shortness of Breath and/or Wheezing  aluminum hydroxide/magnesium hydroxide/simethicone Suspension 30 milliLiter(s) Oral every 4 hours PRN Dyspepsia  guaifenesin/dextromethorphan Oral Liquid 10 milliLiter(s) Oral every 6 hours PRN Cough  melatonin 5 milliGRAM(s) Oral at bedtime PRN Insomnia  ondansetron Injectable 4 milliGRAM(s) IV Push every 8 hours PRN Nausea and/or Vomiting      Social History:    Marital Status:  (   )    (   ) Single    (   )    (  )   Occupation:   Lives with: (  ) alone  (  ) children   (  ) spouse   (  ) parents  (  ) other    Substance Use (street drugs): (  ) never used  (  ) other:  Tobacco Usage:  (   ) never smoked   (   ) former smoker   (   ) current smoker  (     ) pack year  (        ) last cigarette date  Alcohol Usage:  Sexual History:     Family History   IBD (  ) Yes   (  ) No  GI Malignancy (  )  Yes    (  ) No    Health Management     Last Colonoscopy - Denies       (     ) Advanced Directives: (     ) None    (      ) DNR    (     ) DNI    (     ) Health Care Proxy:     Review of Systems:    General:  No wt loss, fevers, chills, night sweats, fatigue,   CV:  No pain, palpitations, hypo/hypertension  Resp:  No dyspnea, cough, tachypnea, wheezing  GI:  See HPI  :  No pain, bleeding, incontinence, nocturia  Muscle:  No pain, weakness  Neuro:  No weakness, tingling, memory problems  Psych:  No fatigue, insomnia, mood problems, depression  Endocrine:  No polyuria, polydypsia, cold/heat intolerance  Heme:  No petechiae, ecchymosis, easy bruisability  Skin:  No rash, tattoos, scars, edema      Vital Signs Last 24 Hrs  T(C): 36.1 (15 May 2022 11:35), Max: 36.6 (14 May 2022 16:51)  T(F): 97 (15 May 2022 11:35), Max: 97.9 (14 May 2022 16:51)  HR: 54 (15 May 2022 11:35) (54 - 60)  BP: 97/65 (15 May 2022 11:35) (97/65 - 103/69)  BP(mean): --  RR: 19 (15 May 2022 11:35) (18 - 19)  SpO2: 97% (15 May 2022 11:35) (93% - 97%)    PHYSICAL EXAM:    Constitutional: NAD  Neck: No LAD, supple  Respiratory: CTA and P  Cardiovascular: S1 and S2, RRR, no M  Gastrointestinal: BS+, soft, NT/ND, neg HSM,  Extremities: No peripheral edema, neg clubbing, cyanosis  Vascular: 2+ peripheral pulses  Neurological: A/O x 3, no focal deficits  Psychiatric: Normal mood, normal affect  Skin: No rashes        LABS:                        14.9   21.18 )-----------( 179      ( 15 May 2022 07:10 )             44.2     05-15    138  |  102  |  31.4<H>  ----------------------------<  151<H>  4.6   |  27.0  |  0.74    Ca    8.8      15 May 2022 07:10    TPro  4.9<L>  /  Alb  2.6<L>  /  TBili  0.4  /  DBili  x   /  AST  15  /  ALT  36  /  AlkPhos  107  05-15        LIVER FUNCTIONS - ( 15 May 2022 07:10 )  Alb: 2.6 g/dL / Pro: 4.9 g/dL / ALK PHOS: 107 U/L / ALT: 36 U/L / AST: 15 U/L / GGT: x             RADIOLOGY & ADDITIONAL TESTS:    < from: CT Angio Chest PE Protocol w/ IV Cont (05.10.22 @ 09:53) >  ACC: [id number] EXAM:  CT ANGIO CHEST PULM P[facility] MA                          PROCEDURE DATE:  05/10/2022          INTERPRETATION:  INDICATION: Pneumonia, CHF, sudden hypoxia, rule out   pulmonary embolism    TECHNIQUE: Volumetric images of the chestwere obtained after the   administration of 60 mL of Omnipaque 350. Maximum intensity projection   images were generated.    COMPARISON: CT chest 5/4/2022.    FINDINGS:    PULMONARY ANGIOGRAM:  No pulmonary embolism. Normal caliber pulmonary   artery.    LUNGS/AIRWAYS/PLEURA: New mucous plug occluding the bronchus intermedius   and extending into the middle and right lower lobe bronchi, and multiple   segmental bronchi in the right lower lobe. New middle lobar collapse and   ill-defined opacitiesin the lower lobes. Stable diffuse groundglass in   the upper lobes. Unremarkable pleura.    LYMPH NODES/MEDIASTINUM: No enlarged lymph nodes.    HEART/VASCULATURE: Cardiomegaly. No pericardial effusion. Stable 4.6 cm   midascending aorta. Coronary artery calcifications. ICD lead in the right   ventricle.    UPPER ABDOMEN: Stable few too small to characterize hypodensities in the   liver. Cholecystectomy. Left renal cyst.    BONES/SOFT TISSUES: No acute abnormality.      IMPRESSION:    No pulmonary embolism.    Since 6 days ago:    New mucous plug occluding the bronchus intermedius and middle and right   lower lobar bronchi, middle lobar collapse, and pneumonia and/or   aspiration in the lower lobes.    Stable groundglass in the upper lobes, compatible with sequela of prior   pneumonia/acute lung injury that was present 2 weeks prior.        --- End of Report ---            KRYSTAL SIM M.D., ATTENDING RADIOLOGIST  This document has been electronically signed. May 10 2022 10:07AM    < end of copied text >

## 2022-05-15 NOTE — PROGRESS NOTE ADULT - ASSESSMENT
76M with PMHX MI/CAD, HTN, HLD, CHFrEF, ICM s/p AICD presents to [facility] ER c/o SOB/CARL and Nonproductive Cough x1-2 days s/p failed outpt abx for PNA admitted for Acute Hypoxic Respiratory Failure 2/2 Viral PNA/Influenza and possible superimposed bacterial PNA.    #Acute Hypoxic Respiratory Failure 2/2 multifocal viral PNA from Influenza A on underlying HFrEF now complicated by mucouos plugging possible secondary bacterial infection   - wean off oxygen as tolerated, suspect he will need prolonged O2 therapy at home.   - PE was ruled out on CTA, duplex - neg   - CT with mucous plugging with rt middle lobe collapse  - on zosyn per ID and pulm. Blood cx- NGTD.  Trial of IV Zosyn   - prone as tolerated, supportive care with IS, mucinex, tylenol, duonebs, Chest PT  Oxygenation improving    # dysphagia - Oral thrush and GERD   nystatin, fluconazole. ppi + maalox     #Leukocytosis  - infectious with recent steroid use  -repeat  CBC  - management as above    #MI/CAD  #HTN/HLD  #Chronic CHFrEF  #ICM s/p AICD  - ASA + Plavix   - Amiodarone,  Coreg , Entresto  - Amlodipine,  Atorvastatin, - Lasix 20mg    DVT ppx - lovenox  Diet - DASH/TLC  Dispo -   Anticiapte DC 1-2 days. To complete Abx tonight. Hospice ref previously placed.     DNR/DNI

## 2022-05-16 LAB
ANION GAP SERPL CALC-SCNC: 11 MMOL/L — SIGNIFICANT CHANGE UP (ref 5–17)
BASOPHILS # BLD AUTO: 0 K/UL — SIGNIFICANT CHANGE UP (ref 0–0.2)
BASOPHILS NFR BLD AUTO: 0 % — SIGNIFICANT CHANGE UP (ref 0–2)
BUN SERPL-MCNC: 30.8 MG/DL — HIGH (ref 8–20)
CALCIUM SERPL-MCNC: 8.7 MG/DL — SIGNIFICANT CHANGE UP (ref 8.6–10.2)
CHLORIDE SERPL-SCNC: 103 MMOL/L — SIGNIFICANT CHANGE UP (ref 98–107)
CO2 SERPL-SCNC: 26 MMOL/L — SIGNIFICANT CHANGE UP (ref 22–29)
CREAT SERPL-MCNC: 0.75 MG/DL — SIGNIFICANT CHANGE UP (ref 0.5–1.3)
EGFR: 92 ML/MIN/1.73M2 — SIGNIFICANT CHANGE UP
EOSINOPHIL # BLD AUTO: 0 K/UL — SIGNIFICANT CHANGE UP (ref 0–0.5)
EOSINOPHIL NFR BLD AUTO: 0 % — SIGNIFICANT CHANGE UP (ref 0–6)
GIANT PLATELETS BLD QL SMEAR: PRESENT — SIGNIFICANT CHANGE UP
GLUCOSE SERPL-MCNC: 174 MG/DL — HIGH (ref 70–99)
HCT VFR BLD CALC: 43.1 % — SIGNIFICANT CHANGE UP (ref 39–50)
HGB BLD-MCNC: 14.5 G/DL — SIGNIFICANT CHANGE UP (ref 13–17)
LYMPHOCYTES # BLD AUTO: 0.54 K/UL — LOW (ref 1–3.3)
LYMPHOCYTES # BLD AUTO: 2.6 % — LOW (ref 13–44)
MAGNESIUM SERPL-MCNC: 2.4 MG/DL — SIGNIFICANT CHANGE UP (ref 1.8–2.6)
MANUAL SMEAR VERIFICATION: SIGNIFICANT CHANGE UP
MCHC RBC-ENTMCNC: 33.5 PG — SIGNIFICANT CHANGE UP (ref 27–34)
MCHC RBC-ENTMCNC: 33.6 GM/DL — SIGNIFICANT CHANGE UP (ref 32–36)
MCV RBC AUTO: 99.5 FL — SIGNIFICANT CHANGE UP (ref 80–100)
METAMYELOCYTES # FLD: 0.9 % — HIGH (ref 0–0)
MONOCYTES # BLD AUTO: 0.35 K/UL — SIGNIFICANT CHANGE UP (ref 0–0.9)
MONOCYTES NFR BLD AUTO: 1.7 % — LOW (ref 2–14)
MYELOCYTES NFR BLD: 0.9 % — HIGH (ref 0–0)
NEUTROPHILS # BLD AUTO: 19.53 K/UL — HIGH (ref 1.8–7.4)
NEUTROPHILS NFR BLD AUTO: 93.9 % — HIGH (ref 43–77)
PHOSPHATE SERPL-MCNC: 2.3 MG/DL — LOW (ref 2.4–4.7)
PLAT MORPH BLD: NORMAL — SIGNIFICANT CHANGE UP
PLATELET # BLD AUTO: 158 K/UL — SIGNIFICANT CHANGE UP (ref 150–400)
POLYCHROMASIA BLD QL SMEAR: SLIGHT — SIGNIFICANT CHANGE UP
POTASSIUM SERPL-MCNC: 4.4 MMOL/L — SIGNIFICANT CHANGE UP (ref 3.5–5.3)
POTASSIUM SERPL-SCNC: 4.4 MMOL/L — SIGNIFICANT CHANGE UP (ref 3.5–5.3)
RBC # BLD: 4.33 M/UL — SIGNIFICANT CHANGE UP (ref 4.2–5.8)
RBC # FLD: 14.7 % — HIGH (ref 10.3–14.5)
RBC BLD AUTO: NORMAL — SIGNIFICANT CHANGE UP
SARS-COV-2 RNA SPEC QL NAA+PROBE: SIGNIFICANT CHANGE UP
SODIUM SERPL-SCNC: 140 MMOL/L — SIGNIFICANT CHANGE UP (ref 135–145)
WBC # BLD: 20.8 K/UL — HIGH (ref 3.8–10.5)
WBC # FLD AUTO: 20.8 K/UL — HIGH (ref 3.8–10.5)

## 2022-05-16 PROCEDURE — 99232 SBSQ HOSP IP/OBS MODERATE 35: CPT

## 2022-05-16 RX ADMIN — Medication 40 MILLIGRAM(S): at 15:14

## 2022-05-16 RX ADMIN — CLOPIDOGREL BISULFATE 75 MILLIGRAM(S): 75 TABLET, FILM COATED ORAL at 11:31

## 2022-05-16 RX ADMIN — PANTOPRAZOLE SODIUM 40 MILLIGRAM(S): 20 TABLET, DELAYED RELEASE ORAL at 05:05

## 2022-05-16 RX ADMIN — AMIODARONE HYDROCHLORIDE 200 MILLIGRAM(S): 400 TABLET ORAL at 05:05

## 2022-05-16 RX ADMIN — Medication 500000 UNIT(S): at 11:31

## 2022-05-16 RX ADMIN — Medication 40 MILLIGRAM(S): at 05:05

## 2022-05-16 RX ADMIN — Medication 500000 UNIT(S): at 23:13

## 2022-05-16 RX ADMIN — Medication 500000 UNIT(S): at 16:58

## 2022-05-16 RX ADMIN — PIPERACILLIN AND TAZOBACTAM 25 GRAM(S): 4; .5 INJECTION, POWDER, LYOPHILIZED, FOR SOLUTION INTRAVENOUS at 05:07

## 2022-05-16 RX ADMIN — Medication 250 MILLIGRAM(S): at 16:56

## 2022-05-16 RX ADMIN — ALBUTEROL 2 PUFF(S): 90 AEROSOL, METERED ORAL at 14:00

## 2022-05-16 RX ADMIN — FLUCONAZOLE 150 MILLIGRAM(S): 150 TABLET ORAL at 11:31

## 2022-05-16 RX ADMIN — ATORVASTATIN CALCIUM 40 MILLIGRAM(S): 80 TABLET, FILM COATED ORAL at 21:29

## 2022-05-16 RX ADMIN — Medication 250 MILLIGRAM(S): at 05:05

## 2022-05-16 RX ADMIN — SACUBITRIL AND VALSARTAN 1 TABLET(S): 24; 26 TABLET, FILM COATED ORAL at 16:56

## 2022-05-16 RX ADMIN — Medication 500000 UNIT(S): at 05:04

## 2022-05-16 RX ADMIN — ENOXAPARIN SODIUM 40 MILLIGRAM(S): 100 INJECTION SUBCUTANEOUS at 05:06

## 2022-05-16 RX ADMIN — ALBUTEROL 2 PUFF(S): 90 AEROSOL, METERED ORAL at 08:00

## 2022-05-16 RX ADMIN — SENNA PLUS 2 TABLET(S): 8.6 TABLET ORAL at 21:29

## 2022-05-16 RX ADMIN — Medication 40 MILLIGRAM(S): at 21:29

## 2022-05-16 RX ADMIN — Medication 81 MILLIGRAM(S): at 11:31

## 2022-05-16 RX ADMIN — CARVEDILOL PHOSPHATE 6.25 MILLIGRAM(S): 80 CAPSULE, EXTENDED RELEASE ORAL at 05:08

## 2022-05-16 RX ADMIN — CARVEDILOL PHOSPHATE 6.25 MILLIGRAM(S): 80 CAPSULE, EXTENDED RELEASE ORAL at 16:57

## 2022-05-16 RX ADMIN — Medication 5 MILLIGRAM(S): at 23:13

## 2022-05-16 RX ADMIN — SACUBITRIL AND VALSARTAN 1 TABLET(S): 24; 26 TABLET, FILM COATED ORAL at 05:08

## 2022-05-16 RX ADMIN — Medication 500000 UNIT(S): at 00:19

## 2022-05-16 NOTE — PROGRESS NOTE ADULT - ASSESSMENT
76M with PMHX MI/CAD, HTN, HLD, CHFrEF, ICM s/p AICD presents to [facility] ER c/o SOB/CARL and Nonproductive Cough x1-2 days s/p failed outpt abx for PNA admitted for Acute Hypoxic Respiratory Failure 2/2 Viral PNA/Influenza and possible superimposed bacterial PNA.    #Acute Hypoxic Respiratory Failure 2/2 multifocal viral PNA from Influenza A on underlying HFrEF now complicated by mucouos plugging possible secondary bacterial infection   - wean off oxygen as tolerated, suspect he will need prolonged O2 therapy at home.   - PE was ruled out on CTA, duplex - neg   - CT with mucous plugging with rt middle lobe collapse  - s/p 5 days of zosyn, will monitor off abx  - on TID IV steroid, wean in AM if HD remains stable  - prone as tolerated, supportive care with IS, mucinex, tylenol, duonebs, Chest PT  Oxygenation improving    #Dysphagia   #Oral thrush  - nystatin, fluconazole. ppi + maalox     #Leukocytosis  - infectious with recent steroid use  - trend CBC  - management as above    #MI/CAD  #HTN/HLD  #Chronic CHFrEF  #ICM s/p AICD  - ASA + Plavix   - Amiodarone,  Coreg , Entresto  - Amlodipine,  Atorvastatin, - Lasix 20mg    DVT ppx - lovenox  Diet - DASH/TLC  Dispo - Anticiapte DC 1-2 days, need home O2 setup    DNR/DNI   76M with PMHX MI/CAD, HTN, HLD, CHFrEF, ICM s/p AICD presents to [facility] ER c/o SOB/CARL and Nonproductive Cough x1-2 days s/p failed outpt abx for PNA admitted for Acute Hypoxic Respiratory Failure 2/2 Viral PNA/Influenza and possible superimposed bacterial PNA.    #Acute Hypoxic Respiratory Failure 2/2 multifocal viral PNA from Influenza A on underlying HFrEF now complicated by mucouos plugging possible secondary bacterial infection   - wean off oxygen as tolerated, suspect he will need prolonged O2 therapy at home.   - PE was ruled out on CTA, duplex - neg   - CT with mucous plugging with rt middle lobe collapse  - s/p 5 days of zosyn, will monitor off abx  - on TID IV steroid, wean in AM if HD remains stable  - prone as tolerated, supportive care with IS, mucinex, tylenol, duonebs, Chest PT  Oxygenation improving    #Dysphagia   #Oral thrush  - nystatin, fluconazole. ppi + maalox     #Leukocytosis  - infectious with recent steroid use  - trend CBC  - management as above    #MI/CAD  #HTN/HLD  #Chronic CHFrEF  #ICM s/p AICD  - ASA + Plavix   - Amiodarone,  Coreg , Entresto  - Amlodipine,  Atorvastatin, - Lasix 20mg    DVT ppx - lovenox  Diet - DASH/TLC  Dispo - Anticiapte DC 1-2 days, need home O2 setup    DNR/DNI    Attempted to call daughter 5/16 for update, did not    76M with PMHX MI/CAD, HTN, HLD, CHFrEF, ICM s/p AICD presents to [facility] ER c/o SOB/CARL and Nonproductive Cough x1-2 days s/p failed outpt abx for PNA admitted for Acute Hypoxic Respiratory Failure 2/2 Viral PNA/Influenza and possible superimposed bacterial PNA.    #Acute Hypoxic Respiratory Failure 2/2 multifocal viral PNA from Influenza A on underlying HFrEF now complicated by mucouos plugging possible secondary bacterial infection   - wean off oxygen as tolerated, suspect he will need prolonged O2 therapy at home.   - PE was ruled out on CTA, duplex - neg   - CT with mucous plugging with rt middle lobe collapse  - s/p 5 days of zosyn, will monitor off abx  - on TID IV steroid, wean in AM if HD remains stable  - prone as tolerated, supportive care with IS, mucinex, tylenol, duonebs, Chest PT  Oxygenation improving    #Dysphagia   #Oral thrush  - nystatin, fluconazole. ppi + maalox     #Leukocytosis  - infectious with recent steroid use  - trend CBC  - management as above    #MI/CAD  #HTN/HLD  #Chronic CHFrEF  #ICM s/p AICD  - ASA + Plavix   - Amiodarone,  Coreg , Entresto  - Amlodipine,  Atorvastatin, - Lasix 20mg    DVT ppx - lovenox  Diet - DASH/TLC  Dispo - Anticiapte DC 1-2 days, need home O2 setup    DNR/DNI    Updated daughter Marge on the phone 5/16

## 2022-05-17 LAB
ANION GAP SERPL CALC-SCNC: 10 MMOL/L — SIGNIFICANT CHANGE UP (ref 5–17)
ANISOCYTOSIS BLD QL: SLIGHT — SIGNIFICANT CHANGE UP
BASOPHILS # BLD AUTO: 0 K/UL — SIGNIFICANT CHANGE UP (ref 0–0.2)
BASOPHILS NFR BLD AUTO: 0 % — SIGNIFICANT CHANGE UP (ref 0–2)
BUN SERPL-MCNC: 32.6 MG/DL — HIGH (ref 8–20)
CALCIUM SERPL-MCNC: 8.6 MG/DL — SIGNIFICANT CHANGE UP (ref 8.6–10.2)
CHLORIDE SERPL-SCNC: 101 MMOL/L — SIGNIFICANT CHANGE UP (ref 98–107)
CO2 SERPL-SCNC: 26 MMOL/L — SIGNIFICANT CHANGE UP (ref 22–29)
CREAT SERPL-MCNC: 0.67 MG/DL — SIGNIFICANT CHANGE UP (ref 0.5–1.3)
EGFR: 96 ML/MIN/1.73M2 — SIGNIFICANT CHANGE UP
EOSINOPHIL # BLD AUTO: 0 K/UL — SIGNIFICANT CHANGE UP (ref 0–0.5)
EOSINOPHIL NFR BLD AUTO: 0 % — SIGNIFICANT CHANGE UP (ref 0–6)
GIANT PLATELETS BLD QL SMEAR: PRESENT — SIGNIFICANT CHANGE UP
GLUCOSE SERPL-MCNC: 168 MG/DL — HIGH (ref 70–99)
HCT VFR BLD CALC: 42.8 % — SIGNIFICANT CHANGE UP (ref 39–50)
HGB BLD-MCNC: 14.5 G/DL — SIGNIFICANT CHANGE UP (ref 13–17)
LYMPHOCYTES # BLD AUTO: 0.18 K/UL — LOW (ref 1–3.3)
LYMPHOCYTES # BLD AUTO: 0.9 % — LOW (ref 13–44)
MACROCYTES BLD QL: SLIGHT — SIGNIFICANT CHANGE UP
MAGNESIUM SERPL-MCNC: 2.3 MG/DL — SIGNIFICANT CHANGE UP (ref 1.6–2.6)
MANUAL SMEAR VERIFICATION: SIGNIFICANT CHANGE UP
MCHC RBC-ENTMCNC: 33.4 PG — SIGNIFICANT CHANGE UP (ref 27–34)
MCHC RBC-ENTMCNC: 33.9 GM/DL — SIGNIFICANT CHANGE UP (ref 32–36)
MCV RBC AUTO: 98.6 FL — SIGNIFICANT CHANGE UP (ref 80–100)
MONOCYTES # BLD AUTO: 0.18 K/UL — SIGNIFICANT CHANGE UP (ref 0–0.9)
MONOCYTES NFR BLD AUTO: 0.9 % — LOW (ref 2–14)
NEUTROPHILS # BLD AUTO: 19.25 K/UL — HIGH (ref 1.8–7.4)
NEUTROPHILS NFR BLD AUTO: 98.2 % — HIGH (ref 43–77)
PHOSPHATE SERPL-MCNC: 2.6 MG/DL — SIGNIFICANT CHANGE UP (ref 2.4–4.7)
PLAT MORPH BLD: NORMAL — SIGNIFICANT CHANGE UP
PLATELET # BLD AUTO: 135 K/UL — LOW (ref 150–400)
POTASSIUM SERPL-MCNC: 4.6 MMOL/L — SIGNIFICANT CHANGE UP (ref 3.5–5.3)
POTASSIUM SERPL-SCNC: 4.6 MMOL/L — SIGNIFICANT CHANGE UP (ref 3.5–5.3)
RBC # BLD: 4.34 M/UL — SIGNIFICANT CHANGE UP (ref 4.2–5.8)
RBC # FLD: 14.6 % — HIGH (ref 10.3–14.5)
RBC BLD AUTO: ABNORMAL
SODIUM SERPL-SCNC: 137 MMOL/L — SIGNIFICANT CHANGE UP (ref 135–145)
WBC # BLD: 19.6 K/UL — HIGH (ref 3.8–10.5)
WBC # FLD AUTO: 19.6 K/UL — HIGH (ref 3.8–10.5)

## 2022-05-17 PROCEDURE — 99232 SBSQ HOSP IP/OBS MODERATE 35: CPT

## 2022-05-17 RX ADMIN — ALBUTEROL 2 PUFF(S): 90 AEROSOL, METERED ORAL at 21:19

## 2022-05-17 RX ADMIN — PANTOPRAZOLE SODIUM 40 MILLIGRAM(S): 20 TABLET, DELAYED RELEASE ORAL at 05:52

## 2022-05-17 RX ADMIN — Medication 40 MILLIGRAM(S): at 17:41

## 2022-05-17 RX ADMIN — CARVEDILOL PHOSPHATE 6.25 MILLIGRAM(S): 80 CAPSULE, EXTENDED RELEASE ORAL at 17:44

## 2022-05-17 RX ADMIN — ATORVASTATIN CALCIUM 40 MILLIGRAM(S): 80 TABLET, FILM COATED ORAL at 21:16

## 2022-05-17 RX ADMIN — Medication 500000 UNIT(S): at 17:41

## 2022-05-17 RX ADMIN — FLUCONAZOLE 150 MILLIGRAM(S): 150 TABLET ORAL at 12:43

## 2022-05-17 RX ADMIN — Medication 81 MILLIGRAM(S): at 12:38

## 2022-05-17 RX ADMIN — SACUBITRIL AND VALSARTAN 1 TABLET(S): 24; 26 TABLET, FILM COATED ORAL at 05:51

## 2022-05-17 RX ADMIN — Medication 250 MILLIGRAM(S): at 05:50

## 2022-05-17 RX ADMIN — Medication 5 MILLIGRAM(S): at 22:26

## 2022-05-17 RX ADMIN — SENNA PLUS 2 TABLET(S): 8.6 TABLET ORAL at 21:16

## 2022-05-17 RX ADMIN — ENOXAPARIN SODIUM 40 MILLIGRAM(S): 100 INJECTION SUBCUTANEOUS at 05:52

## 2022-05-17 RX ADMIN — Medication 40 MILLIGRAM(S): at 05:52

## 2022-05-17 RX ADMIN — CARVEDILOL PHOSPHATE 6.25 MILLIGRAM(S): 80 CAPSULE, EXTENDED RELEASE ORAL at 05:51

## 2022-05-17 RX ADMIN — Medication 10 MILLILITER(S): at 05:55

## 2022-05-17 RX ADMIN — Medication 500000 UNIT(S): at 12:39

## 2022-05-17 RX ADMIN — Medication 500000 UNIT(S): at 21:12

## 2022-05-17 RX ADMIN — Medication 10 MILLILITER(S): at 12:39

## 2022-05-17 RX ADMIN — AMIODARONE HYDROCHLORIDE 200 MILLIGRAM(S): 400 TABLET ORAL at 05:50

## 2022-05-17 RX ADMIN — Medication 500000 UNIT(S): at 05:51

## 2022-05-17 RX ADMIN — AMLODIPINE BESYLATE 5 MILLIGRAM(S): 2.5 TABLET ORAL at 05:51

## 2022-05-17 RX ADMIN — CLOPIDOGREL BISULFATE 75 MILLIGRAM(S): 75 TABLET, FILM COATED ORAL at 12:39

## 2022-05-17 NOTE — PROGRESS NOTE ADULT - ASSESSMENT
76M with PMHX MI/CAD, HTN, HLD, CHFrEF, ICM s/p AICD presents to [facility] ER c/o SOB/CARL and Nonproductive Cough x1-2 days s/p failed outpt abx for PNA admitted for Acute Hypoxic Respiratory Failure 2/2 Viral PNA/Influenza and possible superimposed bacterial PNA.    #Acute Hypoxic Respiratory Failure 2/2 multifocal viral PNA from Influenza A on underlying HFrEF now complicated by mucouos plugging possible secondary bacterial infection   - wean off oxygen as tolerated, suspect he will need prolonged O2 therapy at home.   - PE was ruled out on CTA, duplex - neg   - CT with mucous plugging with rt middle lobe collapse  - s/p 5 days of zosyn, will monitor off abx  - decrease solu-medrol to BID, then to QD in AM if remains stable and discharge with prolong taper.   - prone as tolerated, supportive care with IS, mucinex, tylenol, duonebs, Chest PT  Oxygenation improving    #Dysphagia   #Oral thrush  - nystatin, fluconazole x 7 days  - ppi + maalox     #Leukocytosis  - infectious with recent steroid use  - trend CBC  - management as above    #MI/CAD  #HTN/HLD  #Chronic CHFrEF  #ICM s/p AICD  - ASA + Plavix   - Amiodarone,  Coreg , Entresto  - Amlodipine,  Atorvastatin, - Lasix 20mg    DVT ppx - lovenox  Diet - DASH/TLC  Dispo - Anticipate 1-2 days if remains stable, need O2 set up    DNR/DNI    Updated daughter Marge at bedside 5/17

## 2022-05-18 ENCOUNTER — TRANSCRIPTION ENCOUNTER (OUTPATIENT)
Age: 77
End: 2022-05-18

## 2022-05-18 VITALS
RESPIRATION RATE: 18 BRPM | TEMPERATURE: 98 F | HEART RATE: 68 BPM | SYSTOLIC BLOOD PRESSURE: 110 MMHG | DIASTOLIC BLOOD PRESSURE: 76 MMHG | OXYGEN SATURATION: 92 %

## 2022-05-18 LAB
ANION GAP SERPL CALC-SCNC: 10 MMOL/L — SIGNIFICANT CHANGE UP (ref 5–17)
BUN SERPL-MCNC: 33.3 MG/DL — HIGH (ref 8–20)
CALCIUM SERPL-MCNC: 8.7 MG/DL — SIGNIFICANT CHANGE UP (ref 8.6–10.2)
CHLORIDE SERPL-SCNC: 102 MMOL/L — SIGNIFICANT CHANGE UP (ref 98–107)
CO2 SERPL-SCNC: 25 MMOL/L — SIGNIFICANT CHANGE UP (ref 22–29)
CREAT SERPL-MCNC: 0.69 MG/DL — SIGNIFICANT CHANGE UP (ref 0.5–1.3)
EGFR: 95 ML/MIN/1.73M2 — SIGNIFICANT CHANGE UP
GLUCOSE SERPL-MCNC: 141 MG/DL — HIGH (ref 70–99)
HCT VFR BLD CALC: 42.3 % — SIGNIFICANT CHANGE UP (ref 39–50)
HGB BLD-MCNC: 14.5 G/DL — SIGNIFICANT CHANGE UP (ref 13–17)
MAGNESIUM SERPL-MCNC: 2.3 MG/DL — SIGNIFICANT CHANGE UP (ref 1.6–2.6)
MCHC RBC-ENTMCNC: 33.9 PG — SIGNIFICANT CHANGE UP (ref 27–34)
MCHC RBC-ENTMCNC: 34.3 GM/DL — SIGNIFICANT CHANGE UP (ref 32–36)
MCV RBC AUTO: 98.8 FL — SIGNIFICANT CHANGE UP (ref 80–100)
PHOSPHATE SERPL-MCNC: 2.9 MG/DL — SIGNIFICANT CHANGE UP (ref 2.4–4.7)
PLATELET # BLD AUTO: 127 K/UL — LOW (ref 150–400)
POTASSIUM SERPL-MCNC: 4.8 MMOL/L — SIGNIFICANT CHANGE UP (ref 3.5–5.3)
POTASSIUM SERPL-SCNC: 4.8 MMOL/L — SIGNIFICANT CHANGE UP (ref 3.5–5.3)
RBC # BLD: 4.28 M/UL — SIGNIFICANT CHANGE UP (ref 4.2–5.8)
RBC # FLD: 14.9 % — HIGH (ref 10.3–14.5)
SODIUM SERPL-SCNC: 137 MMOL/L — SIGNIFICANT CHANGE UP (ref 135–145)
WBC # BLD: 17.29 K/UL — HIGH (ref 3.8–10.5)
WBC # FLD AUTO: 17.29 K/UL — HIGH (ref 3.8–10.5)

## 2022-05-18 PROCEDURE — 87077 CULTURE AEROBIC IDENTIFY: CPT

## 2022-05-18 PROCEDURE — 93970 EXTREMITY STUDY: CPT

## 2022-05-18 PROCEDURE — 80048 BASIC METABOLIC PNL TOTAL CA: CPT

## 2022-05-18 PROCEDURE — 84132 ASSAY OF SERUM POTASSIUM: CPT

## 2022-05-18 PROCEDURE — 87186 SC STD MICRODIL/AGAR DIL: CPT

## 2022-05-18 PROCEDURE — 86140 C-REACTIVE PROTEIN: CPT

## 2022-05-18 PROCEDURE — 87641 MR-STAPH DNA AMP PROBE: CPT

## 2022-05-18 PROCEDURE — 85018 HEMOGLOBIN: CPT

## 2022-05-18 PROCEDURE — 96374 THER/PROPH/DIAG INJ IV PUSH: CPT

## 2022-05-18 PROCEDURE — 97116 GAIT TRAINING THERAPY: CPT

## 2022-05-18 PROCEDURE — 84100 ASSAY OF PHOSPHORUS: CPT

## 2022-05-18 PROCEDURE — G1004: CPT

## 2022-05-18 PROCEDURE — 82330 ASSAY OF CALCIUM: CPT

## 2022-05-18 PROCEDURE — 71275 CT ANGIOGRAPHY CHEST: CPT | Mod: ME

## 2022-05-18 PROCEDURE — 0225U NFCT DS DNA&RNA 21 SARSCOV2: CPT

## 2022-05-18 PROCEDURE — U0005: CPT

## 2022-05-18 PROCEDURE — 36415 COLL VENOUS BLD VENIPUNCTURE: CPT

## 2022-05-18 PROCEDURE — 82435 ASSAY OF BLOOD CHLORIDE: CPT

## 2022-05-18 PROCEDURE — 85025 COMPLETE CBC W/AUTO DIFF WBC: CPT

## 2022-05-18 PROCEDURE — 80053 COMPREHEN METABOLIC PANEL: CPT

## 2022-05-18 PROCEDURE — 84145 PROCALCITONIN (PCT): CPT

## 2022-05-18 PROCEDURE — U0003: CPT

## 2022-05-18 PROCEDURE — 97530 THERAPEUTIC ACTIVITIES: CPT

## 2022-05-18 PROCEDURE — 83605 ASSAY OF LACTIC ACID: CPT

## 2022-05-18 PROCEDURE — 84295 ASSAY OF SERUM SODIUM: CPT

## 2022-05-18 PROCEDURE — 85027 COMPLETE CBC AUTOMATED: CPT

## 2022-05-18 PROCEDURE — 87640 STAPH A DNA AMP PROBE: CPT

## 2022-05-18 PROCEDURE — 96375 TX/PRO/DX INJ NEW DRUG ADDON: CPT

## 2022-05-18 PROCEDURE — 99239 HOSP IP/OBS DSCHRG MGMT >30: CPT

## 2022-05-18 PROCEDURE — 83735 ASSAY OF MAGNESIUM: CPT

## 2022-05-18 PROCEDURE — 71250 CT THORAX DX C-: CPT

## 2022-05-18 PROCEDURE — 87070 CULTURE OTHR SPECIMN AEROBIC: CPT

## 2022-05-18 PROCEDURE — 82553 CREATINE MB FRACTION: CPT

## 2022-05-18 PROCEDURE — 99285 EMERGENCY DEPT VISIT HI MDM: CPT

## 2022-05-18 PROCEDURE — C8929: CPT

## 2022-05-18 PROCEDURE — 71045 X-RAY EXAM CHEST 1 VIEW: CPT

## 2022-05-18 PROCEDURE — 82550 ASSAY OF CK (CPK): CPT

## 2022-05-18 PROCEDURE — 82947 ASSAY GLUCOSE BLOOD QUANT: CPT

## 2022-05-18 PROCEDURE — 93005 ELECTROCARDIOGRAM TRACING: CPT

## 2022-05-18 PROCEDURE — 83880 ASSAY OF NATRIURETIC PEPTIDE: CPT

## 2022-05-18 PROCEDURE — 87040 BLOOD CULTURE FOR BACTERIA: CPT

## 2022-05-18 PROCEDURE — 94660 CPAP INITIATION&MGMT: CPT

## 2022-05-18 PROCEDURE — 94640 AIRWAY INHALATION TREATMENT: CPT

## 2022-05-18 PROCEDURE — 85014 HEMATOCRIT: CPT

## 2022-05-18 PROCEDURE — 82803 BLOOD GASES ANY COMBINATION: CPT

## 2022-05-18 PROCEDURE — 96376 TX/PRO/DX INJ SAME DRUG ADON: CPT

## 2022-05-18 PROCEDURE — 84484 ASSAY OF TROPONIN QUANT: CPT

## 2022-05-18 PROCEDURE — 94760 N-INVAS EAR/PLS OXIMETRY 1: CPT

## 2022-05-18 RX ORDER — PANTOPRAZOLE SODIUM 20 MG/1
1 TABLET, DELAYED RELEASE ORAL
Qty: 30 | Refills: 0
Start: 2022-05-18 | End: 2022-06-16

## 2022-05-18 RX ORDER — ALBUTEROL 90 UG/1
2 AEROSOL, METERED ORAL
Qty: 240 | Refills: 0
Start: 2022-05-18 | End: 2022-06-16

## 2022-05-18 RX ORDER — SACCHAROMYCES BOULARDII 250 MG
1 POWDER IN PACKET (EA) ORAL
Qty: 60 | Refills: 0
Start: 2022-05-18 | End: 2022-06-16

## 2022-05-18 RX ADMIN — Medication 500000 UNIT(S): at 17:13

## 2022-05-18 RX ADMIN — Medication 81 MILLIGRAM(S): at 11:18

## 2022-05-18 RX ADMIN — Medication 40 MILLIGRAM(S): at 05:06

## 2022-05-18 RX ADMIN — ENOXAPARIN SODIUM 40 MILLIGRAM(S): 100 INJECTION SUBCUTANEOUS at 05:05

## 2022-05-18 RX ADMIN — CARVEDILOL PHOSPHATE 6.25 MILLIGRAM(S): 80 CAPSULE, EXTENDED RELEASE ORAL at 17:13

## 2022-05-18 RX ADMIN — Medication 500000 UNIT(S): at 11:18

## 2022-05-18 RX ADMIN — PANTOPRAZOLE SODIUM 40 MILLIGRAM(S): 20 TABLET, DELAYED RELEASE ORAL at 05:04

## 2022-05-18 RX ADMIN — Medication 40 MILLIGRAM(S): at 17:14

## 2022-05-18 RX ADMIN — SACUBITRIL AND VALSARTAN 1 TABLET(S): 24; 26 TABLET, FILM COATED ORAL at 05:04

## 2022-05-18 RX ADMIN — CARVEDILOL PHOSPHATE 6.25 MILLIGRAM(S): 80 CAPSULE, EXTENDED RELEASE ORAL at 05:05

## 2022-05-18 RX ADMIN — AMLODIPINE BESYLATE 5 MILLIGRAM(S): 2.5 TABLET ORAL at 05:05

## 2022-05-18 RX ADMIN — Medication 500000 UNIT(S): at 05:05

## 2022-05-18 RX ADMIN — FLUCONAZOLE 150 MILLIGRAM(S): 150 TABLET ORAL at 11:18

## 2022-05-18 RX ADMIN — Medication 20 MILLIGRAM(S): at 05:05

## 2022-05-18 RX ADMIN — SACUBITRIL AND VALSARTAN 1 TABLET(S): 24; 26 TABLET, FILM COATED ORAL at 17:13

## 2022-05-18 RX ADMIN — AMIODARONE HYDROCHLORIDE 200 MILLIGRAM(S): 400 TABLET ORAL at 05:05

## 2022-05-18 RX ADMIN — CLOPIDOGREL BISULFATE 75 MILLIGRAM(S): 75 TABLET, FILM COATED ORAL at 11:18

## 2022-05-18 RX ADMIN — ALBUTEROL 2 PUFF(S): 90 AEROSOL, METERED ORAL at 02:00

## 2022-05-18 RX ADMIN — Medication 250 MILLIGRAM(S): at 05:04

## 2022-05-18 RX ADMIN — Medication 250 MILLIGRAM(S): at 17:13

## 2022-05-18 NOTE — DISCHARGE NOTE NURSING/CASE MANAGEMENT/SOCIAL WORK - NSDCPEFALRISK_GEN_ALL_CORE
For information on Fall & Injury Prevention, visit: [email or web address] OR  [email or web address] OR  https://www.cdc.gov/steadi/patient.html

## 2022-05-18 NOTE — CHART NOTE - NSCHARTNOTEFT_GEN_A_CORE
To Whom it Concerns,    Mr. Ruddy Zimmer has been an admitted patient at [facility] from 4/27/2022 through 5/18/2022.      Waleska Kathleen-PA  Medicine Department  [facility]

## 2022-05-18 NOTE — PROGRESS NOTE ADULT - SUBJECTIVE AND OBJECTIVE BOX
[facility]                                                INFECTIOUS DISEASES  =======================================================                               Tyron Concepcion MD#  Shay Low MD*                                     Angela Gloria MD*    Morenita Khalil MD*            Diplomates American Board of Internal Medicine & Infectious Diseases                  # [place] Office - Appt - Tel  [phone] Fax [phone]                * [place] Office - Appt - Tel [phone] Fax [phone]                                  Hospital Consult line:  [phone]  =======================================================  [id number]  GALI KELLEY   follow up: shortness of breath, mucus plugging    patient seen this AM.   doing better.  now on nasal cannula      I have personally reviewed the labs and data; pertinent labs and data are listed in this note; please see below.   =======================================================  Past Medical & Surgical Hx:  =====================  PAST MEDICAL & SURGICAL HISTORY:  HTN (hypertension)  High cholesterol  Ejection fraction &lt; 50%  AICD (automatic cardioverter/defibrillator) present  History of total hip replacement, right  S/P cholecystectomy  Abdominal hernia    Problem List:  ==========  HEALTH ISSUES - PROBLEM Dx:  Ejection fraction &lt; 50%  Pulmonary infiltrates  Acute respiratory failure with hypoxia  Mucus plugging of bronchi  Pneumonia due to influenza A virus     Social Hx:  =======  no toxic habits currently  former smoker of tobacco and marijuana  denies IVDU    FAMILY HISTORY:  No pertinent family history in first degree relatives    no significant family history of immunosuppressive disorders in mother or father   =======================================================    REVIEW OF SYSTEMS:  CONSTITUTIONAL:  No Fever or chills  HEENT:  No diplopia or blurred vision.  No earache, sore throat or runny nose.  CARDIOVASCULAR:  No pressure, squeezing, strangling, tightness, heaviness or aching about the chest, neck, axilla or epigastrium.  RESPIRATORY:  + shortness of breath  GASTROINTESTINAL:  No nausea, vomiting or diarrhea.  GENITOURINARY:  No dysuria, frequency or urgency. No Blood in urine  MUSCULOSKELETAL:  no joint aches, no muscle pain  SKIN:  No change in skin, hair or nails.  NEUROLOGIC:  No Headaches, seizures or weakness.  PSYCHIATRIC:  No disorder of thought or mood.  ENDOCRINE:  No heat or cold intolerance  HEMATOLOGICAL:  No easy bruising or bleeding.    =======================================================  Allergies  No Known Allergies       ======================================================  Physical Exam:  ============     General:  No acute distress.  Eye: Pupils are equal, round and reactive to light, Extraocular movements are intact, Normal conjunctiva.  HENT: Normocephalic, Oral mucosa is moist, No pharyngeal erythema, No sinus tenderness.  Neck: Supple, No lymphadenopathy.  Respiratory: Lungs with poor aeration  + tachypnea  Cardiovascular: Normal rate, Regular rhythm,   Gastrointestinal: Soft, Non-tender, Non-distended, Normal bowel sounds.  Genitourinary: No costovertebral angle tenderness.  Lymphatics: No lymphadenopathy neck,   Musculoskeletal: Normal range of motion, Normal strength.  Integumentary: No rash.  Neurologic: Alert, Oriented, No focal deficits, Cranial Nerves II-XII are grossly intact.  Psychiatric: Appropriate mood & affect.    =======================================================    Vitals:  ============  T(F): 97.6 (13 May 2022 05:22), Max: 97.7 (12 May 2022 11:12)  HR: 61 (13 May 2022 05:22)  BP: 95/59 (13 May 2022 05:22)  RR: 20 (13 May 2022 05:22)  SpO2: 93% (13 May 2022 05:22) (93% - 96%)  temp max in last 48H T(F): , Max: 98.3 (05-11-22 @ 16:58)    =======================================================  Current Antibiotics:  nystatin    Suspension 295900 Unit(s) Swish and Swallow four times a day  piperacillin/tazobactam IVPB.. 3.375 Gram(s) IV Intermittent every 8 hours    Other medications:  ALBUTerol    90 MICROgram(s) HFA Inhaler 2 Puff(s) Inhalation every 6 hours  aMIOdarone    Tablet 200 milliGRAM(s) Oral daily  amLODIPine   Tablet 5 milliGRAM(s) Oral daily  aspirin enteric coated 81 milliGRAM(s) Oral daily  atorvastatin 40 milliGRAM(s) Oral at bedtime  carvedilol 6.25 milliGRAM(s) Oral every 12 hours  clopidogrel Tablet 75 milliGRAM(s) Oral daily  enoxaparin Injectable 40 milliGRAM(s) SubCutaneous every 24 hours  furosemide    Tablet 20 milliGRAM(s) Oral daily  methylPREDNISolone sodium succinate Injectable 40 milliGRAM(s) IV Push every 8 hours  pantoprazole    Tablet 40 milliGRAM(s) Oral before breakfast  saccharomyces boulardii 250 milliGRAM(s) Oral two times a day  sacubitril 24 mG/valsartan 26 mG 1 Tablet(s) Oral two times a day  senna 2 Tablet(s) Oral at bedtime      =======================================================  Labs:    Culture - Sputum . (05.11.22 @ 21:57)    Gram Stain:   Rare polymorphonuclear leukocytes per low power field  Moderate Squamous epithelial cells per low power field  Numerous Yeast like cells per oil power field  Rare Gram Negative Rods per oil power field  Rare Gram Positive Cocci in Pairs and Chains per oil power field  Rare Gram Positive Rods per oil power field    Specimen Source: .Sputum Sputum    Culture Results:   Moderate Klebsiella oxytoca/Raoutella ornithinolytica  Normal Respiratory Marylin present        Culture - Blood (collected 05-10-22 @ 07:20)  Source: .Blood Blood-Peripheral    Culture - Blood (collected 05-10-22 @ 07:18)  Source: .Blood Blood-Peripheral      Procalcitonin, Serum: 0.10 ng/mL (05-10-22 @ 10:05)    SARS-CoV-2: NotDetec (05-10-22 @ 02:43)  COVID-19 PCR: NotDetec (05-01-22 @ 10:15)  SARS-CoV-2: NotDetec (04-27-22 @ 13:08)      =======================================================    < from: CT Angio Chest PE Protocol w/ IV Cont (05.10.22 @ 09:53) >    ACC: [id number] EXAM:  CT ANGIO CHEST PULM [facility]                          PROCEDURE DATE:  05/10/2022        INTERPRETATION:  INDICATION: Pneumonia, CHF, sudden hypoxia, rule out pulmonary embolism    TECHNIQUE: Volumetric images of the chestwere obtained after the administration of 60 mL of Omnipaque 350. Maximum intensity projection images were generated.    COMPARISON: CT chest 5/4/2022.    FINDINGS:    PULMONARY ANGIOGRAM:  No pulmonary embolism. Normal caliber pulmonary artery.    LUNGS/AIRWAYS/PLEURA: New mucous plug occluding the bronchus intermedius and extending into the middle and right lower lobe bronchi, and multiple segmental bronchi in the right lower lobe. New middle lobar collapse and ill-defined opacitiesin the lower lobes. Stable diffuse groundglass in the upper lobes. Unremarkable pleura.    LYMPH NODES/MEDIASTINUM: No enlarged lymph nodes.    HEART/VASCULATURE: Cardiomegaly. No pericardial effusion. Stable 4.6 cm mid-ascending aorta. Coronary artery calcifications. ICD lead in the right ventricle.    UPPER ABDOMEN: Stable few too small to characterize hypodensities in the liver. Cholecystectomy. Left renal cyst.    BONES/SOFT TISSUES: No acute abnormality.      IMPRESSION:  No pulmonary embolism.  Since 6 days ago:  New mucous plug occluding the bronchus intermedius and middle and right lower lobar bronchi, middle lobar collapse, and pneumonia and/or aspiration in the lower lobes.  Stable groundglass in the upper lobes, compatible with sequela of prior pneumonia/acute lung injury that was present 2 weeks prior.      --- End of Report ---      KRYSTAL SIM M.D., ATTENDING RADIOLOGIST  This document has been electronically signed. May 10 2022 10:07AM    < end of copied text >     
CC: AHRF 2/2 Viral PNA / Influenza A     INTERVAL HPI/OVERNIGHT EVENTS:  acute decompensationon 5/10, required Hi flow Oxygen - weaned off to 5lit NC oxygen this am   feels okay  difficulty swallowing    Vital Signs Last 24 Hrs  T(C): 36.5 (12 May 2022 11:12), Max: 36.8 (11 May 2022 16:58)  T(F): 97.7 (12 May 2022 11:12), Max: 98.3 (11 May 2022 16:58)  HR: 61 (12 May 2022 11:12) (61 - 94)  BP: 102/62 (12 May 2022 11:12) (102/62 - 111/69)  BP(mean): --  RR: 20 (12 May 2022 11:12) (19 - 20)  SpO2: 95% (12 May 2022 11:12) (93% - 97%)      PHYSICAL EXAM:  General: on  Nasal cannula Oxygen, poorly nourished   Eyes: PERRLA, EOMI; conjunctiva and sclera clear. oral thrush+  Head: Normocephalic; atraumatic  Neck: Supple  Respiratory: CTAB, Rt lower lobe decreased breath sounds   Cardiovascular: Regular rate and rhythm. S1 and S2 Normal; No murmur  Extremities: Normal range of motion, No clubbing, cyanosis or edema  Neurological: Alert and oriented x4  Skin: Warm and dry. No acute rash  Psychiatric: Cooperative and appropriate      LABS:                                     MEDICATIONS  (STANDING):  ALBUTerol    90 MICROgram(s) HFA Inhaler 2 Puff(s) Inhalation every 6 hours  aMIOdarone    Tablet 200 milliGRAM(s) Oral daily  amLODIPine   Tablet 5 milliGRAM(s) Oral daily  aspirin enteric coated 81 milliGRAM(s) Oral daily  atorvastatin 40 milliGRAM(s) Oral at bedtime  carvedilol 6.25 milliGRAM(s) Oral every 12 hours  clopidogrel Tablet 75 milliGRAM(s) Oral daily  enoxaparin Injectable 40 milliGRAM(s) SubCutaneous every 24 hours  furosemide    Tablet 20 milliGRAM(s) Oral daily  methylPREDNISolone sodium succinate Injectable 40 milliGRAM(s) IV Push every 8 hours  nystatin    Suspension 742183 Unit(s) Swish and Swallow four times a day  pantoprazole    Tablet 40 milliGRAM(s) Oral before breakfast  piperacillin/tazobactam IVPB.. 3.375 Gram(s) IV Intermittent every 8 hours  saccharomyces boulardii 250 milliGRAM(s) Oral two times a day  sacubitril 24 mG/valsartan 26 mG 1 Tablet(s) Oral two times a day  senna 2 Tablet(s) Oral at bedtime    MEDICATIONS  (PRN):  acetaminophen     Tablet .. 650 milliGRAM(s) Oral every 6 hours PRN Temp greater or equal to 38C (100.4F), Mild Pain (1 - 3)  ALBUTerol    0.083% 2.5 milliGRAM(s) Nebulizer every 2 hours PRN Shortness of Breath and/or Wheezing  aluminum hydroxide/magnesium hydroxide/simethicone Suspension 30 milliLiter(s) Oral every 4 hours PRN Dyspepsia  guaifenesin/dextromethorphan Oral Liquid 10 milliLiter(s) Oral every 6 hours PRN Cough  melatonin 5 milliGRAM(s) Oral at bedtime PRN Insomnia  ondansetron Injectable 4 milliGRAM(s) IV Push every 8 hours PRN Nausea and/or Vomiting    
CC: AHRF 2/2 Viral PNA / Influenza A r/o ARDS (05 May 2022 10:17)    INTERVAL HPI/OVERNIGHT EVENTS:  no acute events overnight - feels better.   on 4lit NC     Vital Signs Last 24 Hrs  T(C): 36.5 (09 May 2022 12:30), Max: 36.7 (09 May 2022 04:41)  T(F): 97.7 (09 May 2022 12:30), Max: 98.1 (09 May 2022 04:41)  HR: 66 (09 May 2022 12:30) (62 - 69)  BP: 117/67 (09 May 2022 12:30) (117/67 - 125/72)  BP(mean): --  RR: 20 (09 May 2022 12:30) (18 - 20)  SpO2: 95% (09 May 2022 12:30) (92% - 98%)      PHYSICAL EXAM:  General: in no acute distress, poorly nourished   Eyes: PERRLA, EOMI; conjunctiva and sclera clear  Head: Normocephalic; atraumatic  ENMT: No nasal discharge; airway clear  Neck: Supple; non tender; no masses  Respiratory: CTAB  Cardiovascular: Regular rate and rhythm. S1 and S2 Normal; No murmur  Extremities: Normal range of motion, No clubbing, cyanosis or edema  Neurological: Alert and oriented x4  Skin: Warm and dry. No acute rash  Psychiatric: Cooperative and appropriate      LABS:                          15.0   20.19 )-----------( 318      ( 09 May 2022 05:55 )             44.5     05-09    138  |  105  |  33.2<H>  ----------------------------<  144<H>  5.0   |  23.0  |  0.67    Ca    8.8      09 May 2022 05:55      MEDICATIONS  (STANDING):  albuterol/ipratropium for Nebulization 3 milliLiter(s) Nebulizer every 6 hours  aMIOdarone    Tablet 200 milliGRAM(s) Oral daily  amLODIPine   Tablet 5 milliGRAM(s) Oral daily  aspirin enteric coated 81 milliGRAM(s) Oral daily  atorvastatin 40 milliGRAM(s) Oral at bedtime  benzonatate 100 milliGRAM(s) Oral three times a day  carvedilol 6.25 milliGRAM(s) Oral every 12 hours  clopidogrel Tablet 75 milliGRAM(s) Oral daily  enoxaparin Injectable 40 milliGRAM(s) SubCutaneous every 24 hours  furosemide    Tablet 20 milliGRAM(s) Oral daily  predniSONE   Tablet 20 milliGRAM(s) Oral daily  sacubitril 24 mG/valsartan 26 mG 1 Tablet(s) Oral two times a day  senna 2 Tablet(s) Oral at bedtime    MEDICATIONS  (PRN):  acetaminophen     Tablet .. 650 milliGRAM(s) Oral every 6 hours PRN Temp greater or equal to 38C (100.4F), Mild Pain (1 - 3)  ALBUTerol    0.083% 2.5 milliGRAM(s) Nebulizer every 2 hours PRN Shortness of Breath and/or Wheezing  aluminum hydroxide/magnesium hydroxide/simethicone Suspension 30 milliLiter(s) Oral every 4 hours PRN Dyspepsia  guaifenesin/dextromethorphan Oral Liquid 10 milliLiter(s) Oral every 6 hours PRN Cough  melatonin 5 milliGRAM(s) Oral at bedtime PRN Insomnia  ondansetron Injectable 4 milliGRAM(s) IV Push every 8 hours PRN Nausea and/or Vomiting      
PULMONARY PROGRESS NOTE      GALI KELLEYGERRY-[id number]    Patient is a 76y old  Male who presents with a chief complaint of AHRF 2/2 Viral PNA / Influenza A r/o ARDS (10 May 2022 13:21)  Post influenza pneumonia with mucous plugging and scarring and persistent hypoxia    INTERVAL HPI/OVERNIGHT EVENTS:  NAD on HiFlo    MEDICATIONS  (STANDING):  acetylcysteine 20%  Inhalation 4 milliLiter(s) Inhalation every 6 hours  ALBUTerol    0.083% 2.5 milliGRAM(s) Nebulizer every 6 hours  aMIOdarone    Tablet 200 milliGRAM(s) Oral daily  amLODIPine   Tablet 5 milliGRAM(s) Oral daily  aspirin enteric coated 81 milliGRAM(s) Oral daily  atorvastatin 40 milliGRAM(s) Oral at bedtime  carvedilol 6.25 milliGRAM(s) Oral every 12 hours  clopidogrel Tablet 75 milliGRAM(s) Oral daily  enoxaparin Injectable 40 milliGRAM(s) SubCutaneous every 24 hours  furosemide    Tablet 20 milliGRAM(s) Oral daily  methylPREDNISolone sodium succinate Injectable 40 milliGRAM(s) IV Push every 8 hours  piperacillin/tazobactam IVPB.. 3.375 Gram(s) IV Intermittent every 8 hours  saccharomyces boulardii 250 milliGRAM(s) Oral two times a day  sacubitril 24 mG/valsartan 26 mG 1 Tablet(s) Oral two times a day  senna 2 Tablet(s) Oral at bedtime      MEDICATIONS  (PRN):  acetaminophen     Tablet .. 650 milliGRAM(s) Oral every 6 hours PRN Temp greater or equal to 38C (100.4F), Mild Pain (1 - 3)  ALBUTerol    0.083% 2.5 milliGRAM(s) Nebulizer every 2 hours PRN Shortness of Breath and/or Wheezing  aluminum hydroxide/magnesium hydroxide/simethicone Suspension 30 milliLiter(s) Oral every 4 hours PRN Dyspepsia  guaifenesin/dextromethorphan Oral Liquid 10 milliLiter(s) Oral every 6 hours PRN Cough  melatonin 5 milliGRAM(s) Oral at bedtime PRN Insomnia  ondansetron Injectable 4 milliGRAM(s) IV Push every 8 hours PRN Nausea and/or Vomiting      Allergies    No Known Allergies    Intolerances        PAST MEDICAL & SURGICAL HISTORY:  HTN (hypertension)      High cholesterol      Ejection fraction &lt; 50%      AICD (automatic cardioverter/defibrillator) present      History of total hip replacement, right      S/P cholecystectomy      Abdominal hernia          SOCIAL HISTORY  Smoking History:           Vital Signs Last 24 Hrs  T(C): 36.6 (11 May 2022 04:20), Max: 36.7 (10 May 2022 16:51)  T(F): 97.8 (11 May 2022 04:20), Max: 98.1 (10 May 2022 16:51)  HR: 52 (11 May 2022 08:35) (52 - 98)  BP: 103/68 (11 May 2022 04:20) (103/68 - 116/66)  BP(mean): --  RR: 20 (11 May 2022 04:20) (20 - 20)  SpO2: 96% (11 May 2022 08:37) (91% - 98%)    PHYSICAL EXAMINATION:    GENERAL: The patient is awake and alert in no apparent distress on HiFlo nasal 02.     HEENT: Head is normocephalic and atraumatic..    NECK: Supple.    LUNGS: Diminished to auscultation without wheezing, rales or rhonchi; respirations unlabored    HEART: Regular rate and rhythm without murmur    LABS:                        15.9   23.28 )-----------( 299      ( 10 May 2022 10:05 )             47.1     05-10    133<L>  |  101  |  35.4<H>  ----------------------------<  184<H>  5.3   |  21.0<L>  |  0.83    Ca    9.1      10 May 2022 10:05          ABG - ( 10 May 2022 04:15 )  pH, Arterial: 7.490 pH, Blood: x     /  pCO2: 35    /  pO2: 82    / HCO3: 27    / Base Excess: 3.4   /  SaO2: 98.3            Procalcitonin, Serum: 0.10 ng/mL (05-10-22 @ 10:05)      MICROBIOLOGY: Non-contrib    RADIOLOGY & ADDITIONAL STUDIES:    Echo on 5/10/22   1. Left ventricular ejection fraction, by visual estimation, is 20 to   25%.   2. Severely decreased global left ventricular systolic function.   3. Spectral Doppler shows impaired relaxation pattern of left   ventricular myocardial filling (Grade I diastolic dysfunction).   4. There is mild eccentric left ventricular hypertrophy.   5. Mild mitral annular calcification.   6. Mild mitral valve regurgitation.   7. Mild thickening of the anterior and posterior mitral valve leaflets.   8. Sclerotic aortic valve with normal opening.   9. The ascending aorta (sinus of valsalva) is dilated measuring 40 mm.   The tubular ascending aorta is dilated measuring 42mm.  10.Lipomatous hypertrophy of the intra-atrial septum.  11. Adequate TR velocity was not obtained to accurately assess RVSP.    Kai Hickey MD Electronically signed on 5/11/2022 at 8:38:42 AM      CTA on 5/10/22  IMPRESSION:    No pulmonary embolism.    Since 6 days ago:    New mucous plug occluding the bronchus intermedius and middle and right   lower lobar bronchi, middle lobar collapse, and pneumonia and/or   aspiration in the lower lobes.    Stable groundglass in the upper lobes, compatible with sequela of prior   pneumonia/acute lung injury that was present 2 weeks prior.      KRYSTAL SIM M.D., ATTENDING RADIOLOGIST  This document has been electronically signed. May 10 2022 10:07AM  
PULMONARY PROGRESS NOTE      GALI KELLEYGERRY-[id number]    Patient is a 76y old  Male who presents with a chief complaint of AHRF 2/2 Viral PNA / Influenza A r/o ARDS (28 Apr 2022 17:32)      INTERVAL HPI/OVERNIGHT EVENTS:REmains on HFNC/  NAD    MEDICATIONS  (STANDING):  albuterol/ipratropium for Nebulization 3 milliLiter(s) Nebulizer every 6 hours  aMIOdarone    Tablet 200 milliGRAM(s) Oral daily  amLODIPine   Tablet 5 milliGRAM(s) Oral daily  aspirin enteric coated 81 milliGRAM(s) Oral daily  atorvastatin 40 milliGRAM(s) Oral at bedtime  carvedilol 6.25 milliGRAM(s) Oral every 12 hours  clopidogrel Tablet 75 milliGRAM(s) Oral daily  enoxaparin Injectable 40 milliGRAM(s) SubCutaneous every 24 hours  furosemide    Tablet 20 milliGRAM(s) Oral daily  oseltamivir 75 milliGRAM(s) Oral two times a day  potassium chloride    Tablet ER 40 milliEquivalent(s) Oral once  sacubitril 49 mG/valsartan 51 mG 1 Tablet(s) Oral two times a day      MEDICATIONS  (PRN):  acetaminophen     Tablet .. 650 milliGRAM(s) Oral every 6 hours PRN Temp greater or equal to 38C (100.4F), Mild Pain (1 - 3)  ALBUTerol    0.083% 2.5 milliGRAM(s) Nebulizer every 2 hours PRN Shortness of Breath and/or Wheezing  aluminum hydroxide/magnesium hydroxide/simethicone Suspension 30 milliLiter(s) Oral every 4 hours PRN Dyspepsia  melatonin 3 milliGRAM(s) Oral at bedtime PRN Insomnia  ondansetron Injectable 4 milliGRAM(s) IV Push every 8 hours PRN Nausea and/or Vomiting      Allergies    No Known Allergies    Intolerances        PAST MEDICAL & SURGICAL HISTORY:  HTN (hypertension)    High cholesterol    Ejection fraction &lt; 50%    AICD (automatic cardioverter/defibrillator) present    History of total hip replacement, right    S/P cholecystectomy    Abdominal hernia        SOCIAL HISTORY  Smoking History:       REVIEW OF SYSTEMS:    CONSTITUTIONAL:  per HPI    HEENT:  Eyes:  No diplopia or blurred vision. ENT:  No earache, sore throat or runny nose.    CARDIOVASCULAR:  No pressure, squeezing, tightness, heaviness or aching about the chest; no palpitations.    RESPIRATORY:  per hpi    GASTROINTESTINAL:  No nausea, vomiting or diarrhea.    GENITOURINARY:  No dysuria, frequency or urgency.    MUSCULOSKELETAL:  No joint pain    SKIN:  No new lesions.    NEUROLOGIC:  No paresthesias, fasciculations, seizures or weakness.    PSYCHIATRIC:  No disorder of thought or mood.    ENDOCRINE:  No heat or cold intolerance, polyuria or polydipsia.    HEMATOLOGICAL:  No easy bruising or bleeding.     Vital Signs Last 24 Hrs  T(C): 36.4 (29 Apr 2022 04:42), Max: 36.6 (28 Apr 2022 16:40)  T(F): 97.6 (29 Apr 2022 04:42), Max: 97.9 (28 Apr 2022 21:52)  HR: 68 (29 Apr 2022 08:50) (55 - 73)  BP: 105/64 (29 Apr 2022 04:42) (92/55 - 105/64)  BP(mean): --  RR: 18 (29 Apr 2022 11:06) (18 - 18)  SpO2: 94% (29 Apr 2022 11:06) (85% - 95%)    PHYSICAL EXAMINATION:    GENERAL: The patient is awake and alert in no apparent distress.     HEENT: Head is normocephalic and atraumatic. Extraocular muscles are intact. Mucous membranes are moist.    NECK: Supple.    LUNGS:few crackles    HEART: Regular rate and rhythm without murmur.    ABDOMEN: Soft, nontender, and nondistended.      EXTREMITIES: Without any cyanosis, clubbing, rash, lesions or edema.    NEUROLOGIC: Grossly intact.    SKIN: No ulceration or induration present.      LABS:                        14.4   22.87 )-----------( 221      ( 29 Apr 2022 06:26 )             41.8     04-29    141  |  105  |  23.7<H>  ----------------------------<  139<H>  3.3<L>   |  24.0  |  0.70    Ca    8.9      29 Apr 2022 06:26  Phos  2.9     04-28  Mg     2.4     04-28    TPro  5.8<L>  /  Alb  2.7<L>  /  TBili  0.8  /  DBili  x   /  AST  44<H>  /  ALT  48<H>  /  AlkPhos  80  04-28          CARDIAC MARKERS ( 28 Apr 2022 06:17 )  x     / <0.01 ng/mL / 162 U/L / x     / 1.8 ng/mL  CARDIAC MARKERS ( 27 Apr 2022 13:09 )  x     / <0.01 ng/mL / x     / x     / x            Serum Pro-Brain Natriuretic Peptide: 1263 pg/mL (04-27-22 @ 13:09)      Procalcitonin, Serum: 0.20 ng/mL (04-28-22 @ 06:17)      MICROBIOLOGY:    RADIOLOGY & ADDITIONAL STUDIES:< from: CT Angio Chest PE Protocol w/ IV Cont (04.27.22 @ 16:25) >  No pulmonaryembolus.    Patchy bilateral groundglass opacities which have the appearance of COVID   19 pneumonia. Correlate with PCR examination.      < end of copied text >  
PULMONARY PROGRESS NOTE      GALI KELLEYGERRY-[id number]    Patient is a 76y old  Male who presents with a chief complaint of AHRF 2/2 Viral PNA / Influenza A r/o ARDS (30 Apr 2022 12:09)      INTERVAL HPI/OVERNIGHT EVENTS:REmains hypoxemic, requiring intermittent NRB over HFNC.  Denies increased SOB cough c/w past few days.    MEDICATIONS  (STANDING):  albuterol/ipratropium for Nebulization 3 milliLiter(s) Nebulizer every 6 hours  aMIOdarone    Tablet 200 milliGRAM(s) Oral daily  amLODIPine   Tablet 5 milliGRAM(s) Oral daily  aspirin enteric coated 81 milliGRAM(s) Oral daily  atorvastatin 40 milliGRAM(s) Oral at bedtime  azithromycin  IVPB 500 milliGRAM(s) IV Intermittent every 24 hours  benzonatate 100 milliGRAM(s) Oral three times a day  carvedilol 6.25 milliGRAM(s) Oral every 12 hours  clopidogrel Tablet 75 milliGRAM(s) Oral daily  enoxaparin Injectable 40 milliGRAM(s) SubCutaneous every 24 hours  guaiFENesin ER 1200 milliGRAM(s) Oral every 12 hours  methylPREDNISolone sodium succinate Injectable 40 milliGRAM(s) IV Push every 8 hours  oseltamivir 75 milliGRAM(s) Oral two times a day  piperacillin/tazobactam IVPB.. 3.375 Gram(s) IV Intermittent every 8 hours  sacubitril 24 mG/valsartan 26 mG 1 Tablet(s) Oral two times a day      MEDICATIONS  (PRN):  acetaminophen     Tablet .. 650 milliGRAM(s) Oral every 6 hours PRN Temp greater or equal to 38C (100.4F), Mild Pain (1 - 3)  ALBUTerol    0.083% 2.5 milliGRAM(s) Nebulizer every 2 hours PRN Shortness of Breath and/or Wheezing  aluminum hydroxide/magnesium hydroxide/simethicone Suspension 30 milliLiter(s) Oral every 4 hours PRN Dyspepsia  melatonin 3 milliGRAM(s) Oral at bedtime PRN Insomnia  ondansetron Injectable 4 milliGRAM(s) IV Push every 8 hours PRN Nausea and/or Vomiting      Allergies    No Known Allergies    Intolerances        PAST MEDICAL & SURGICAL HISTORY:  HTN (hypertension)    High cholesterol    Ejection fraction &lt; 50%    AICD (automatic cardioverter/defibrillator) present    History of total hip replacement, right    S/P cholecystectomy    Abdominal hernia        SOCIAL HISTORY  Smoking History:       REVIEW OF SYSTEMS:    CONSTITUTIONAL:  per HPI    HEENT:  Eyes:  No diplopia or blurred vision. ENT:  No earache, sore throat or runny nose.    CARDIOVASCULAR:  No pressure, squeezing, tightness, heaviness or aching about the chest; no palpitations.    RESPIRATORY:  per hpi    GASTROINTESTINAL:  No nausea, vomiting or diarrhea.    GENITOURINARY:  No dysuria, frequency or urgency.    MUSCULOSKELETAL:  No joint pain    SKIN:  No new lesions.    NEUROLOGIC:  No paresthesias, fasciculations, seizures or weakness.    PSYCHIATRIC:  No disorder of thought or mood.    ENDOCRINE:  No heat or cold intolerance, polyuria or polydipsia.    HEMATOLOGICAL:  No easy bruising or bleeding.     Vital Signs Last 24 Hrs  T(C): 36.7 (01 May 2022 05:20), Max: 36.7 (30 Apr 2022 11:59)  T(F): 98 (01 May 2022 05:20), Max: 98 (30 Apr 2022 11:59)  HR: 61 (01 May 2022 10:09) (55 - 72)  BP: 103/65 (01 May 2022 05:20) (103/65 - 116/65)  BP(mean): --  RR: 18 (01 May 2022 09:06) (18 - 18)  SpO2: 92% (01 May 2022 10:09) (84% - 98%)    PHYSICAL EXAMINATION:    GENERAL: The patient is awake and alert in no apparent distress.     HEENT: Head is normocephalic and atraumatic. Extraocular muscles are intact. Mucous membranes are moist.    NECK: Supple.    LUNGS: coarse bs.    HEART: Regular rate and rhythm without murmur.    ABDOMEN: Soft, nontender, and nondistended.      EXTREMITIES: Without any cyanosis, clubbing, rash, lesions or edema.    NEUROLOGIC: Grossly intact.    SKIN: No ulceration or induration present.      LABS:                        14.6   15.89 )-----------( 277      ( 01 May 2022 07:29 )             42.4     05-01    139  |  104  |  18.5  ----------------------------<  123<H>  4.1   |  25.0  |  0.65    Ca    8.6      01 May 2022 07:29  Phos  1.8     05-01  Mg     2.2     05-01          ABG - ( 01 May 2022 09:25 )  pH, Arterial: 7.480 pH, Blood: x     /  pCO2: 38    /  pO2: 81    / HCO3: 28    / Base Excess: 4.8   /  SaO2: 97.5  Blood Gas Comments Arterial: Resident on HFNC 40L/100%/31 with 100%NRB (05.01.22 @ 09:25)                              MICROBIOLOGY:    RADIOLOGY & ADDITIONAL STUDIES:
PULMONARY PROGRESS NOTE      WARRENGALIGERRY-[id number]    Patient is a 76y old  Male who presents with a chief complaint of AHRF 2/2 Viral PNA / Influenza A r/o ARDS (04 May 2022 11:17)      INTERVAL HPI/OVERNIGHT EVENTS:  Feeling better.  On 6l NC  MEDICATIONS  (STANDING):  albuterol/ipratropium for Nebulization 3 milliLiter(s) Nebulizer every 6 hours  aMIOdarone    Tablet 200 milliGRAM(s) Oral daily  amLODIPine   Tablet 5 milliGRAM(s) Oral daily  aspirin enteric coated 81 milliGRAM(s) Oral daily  atorvastatin 40 milliGRAM(s) Oral at bedtime  benzonatate 100 milliGRAM(s) Oral three times a day  carvedilol 6.25 milliGRAM(s) Oral every 12 hours  clopidogrel Tablet 75 milliGRAM(s) Oral daily  enoxaparin Injectable 40 milliGRAM(s) SubCutaneous every 24 hours  methylPREDNISolone sodium succinate Injectable 40 milliGRAM(s) IV Push every 8 hours  piperacillin/tazobactam IVPB.. 3.375 Gram(s) IV Intermittent every 8 hours  sacubitril 24 mG/valsartan 26 mG 1 Tablet(s) Oral two times a day      MEDICATIONS  (PRN):  acetaminophen     Tablet .. 650 milliGRAM(s) Oral every 6 hours PRN Temp greater or equal to 38C (100.4F), Mild Pain (1 - 3)  ALBUTerol    0.083% 2.5 milliGRAM(s) Nebulizer every 2 hours PRN Shortness of Breath and/or Wheezing  aluminum hydroxide/magnesium hydroxide/simethicone Suspension 30 milliLiter(s) Oral every 4 hours PRN Dyspepsia  guaifenesin/dextromethorphan Oral Liquid 10 milliLiter(s) Oral every 6 hours PRN Cough  melatonin 5 milliGRAM(s) Oral at bedtime PRN Insomnia  ondansetron Injectable 4 milliGRAM(s) IV Push every 8 hours PRN Nausea and/or Vomiting      Allergies    No Known Allergies    Intolerances        PAST MEDICAL & SURGICAL HISTORY:  HTN (hypertension)    High cholesterol    Ejection fraction &lt; 50%    AICD (automatic cardioverter/defibrillator) present    History of total hip replacement, right    S/P cholecystectomy    Abdominal hernia        SOCIAL HISTORY  Smoking History:       REVIEW OF SYSTEMS:    CONSTITUTIONAL:  per HPI    HEENT:  Eyes:  No diplopia or blurred vision. ENT:  No earache, sore throat or runny nose.    CARDIOVASCULAR:  No pressure, squeezing, tightness, heaviness or aching about the chest; no palpitations.    RESPIRATORY:  per hpi    GASTROINTESTINAL:  No nausea, vomiting or diarrhea.    GENITOURINARY:  No dysuria, frequency or urgency.    MUSCULOSKELETAL:  No joint pain    SKIN:  No new lesions.    NEUROLOGIC:  No paresthesias, fasciculations, seizures or weakness.    PSYCHIATRIC:  No disorder of thought or mood.    ENDOCRINE:  No heat or cold intolerance, polyuria or polydipsia.    HEMATOLOGICAL:  No easy bruising or bleeding.     Vital Signs Last 24 Hrs  T(C): 36.6 (05 May 2022 04:42), Max: 36.6 (05 May 2022 04:42)  T(F): 97.9 (05 May 2022 04:42), Max: 97.9 (05 May 2022 04:42)  HR: 63 (05 May 2022 09:31) (51 - 83)  BP: 109/68 (05 May 2022 04:42) (108/64 - 128/73)  BP(mean): --  RR: 18 (05 May 2022 04:44) (18 - 20)  SpO2: 92% (05 May 2022 09:31) (91% - 95%)    PHYSICAL EXAMINATION:    GENERAL: The patient is awake and alert in no apparent distress.     HEENT: Head is normocephalic and atraumatic. Extraocular muscles are intact. Mucous membranes are moist.    NECK: Supple.    LUNGS: Clear to auscultation without wheezing, rales or rhonchi; respirations unlabored    HEART: Regular rate and rhythm without murmur.    ABDOMEN: Soft, nontender, and nondistended.      EXTREMITIES: Without any cyanosis, clubbing, rash, lesions or edema.    NEUROLOGIC: Grossly intact.    SKIN: No ulceration or induration present.      LABS:                        14.9   21.30 )-----------( 388      ( 05 May 2022 07:27 )             44.9     05-05    139  |  101  |  31.3<H>  ----------------------------<  161<H>  4.6   |  27.0  |  0.72    Ca    9.2      05 May 2022 07:27  Phos  2.7     05-05  Mg     2.5     05-05                          MICROBIOLOGY:    RADIOLOGY & ADDITIONAL STUDIES:< from: CT Chest No Cont (05.04.22 @ 15:25) >    Decreased diffuse bilateral groundglass compatible with pneumonia/acute     < end of copied text >  
[facility] Division of Hospital Medicine    Chief Complaint:  respiratory failure, Influenza a    SUBJECTIVE: reports feeling better, endorses generalized weakness and poor appetite    OVERNIGHT EVENTS: multiple desaturation episodes    Patient denies chest pain, SOB, abd pain, N/V, fever, chills, dysuria or any other complaints. All remainder ROS negative.     MEDICATIONS  (STANDING):  albuterol/ipratropium for Nebulization 3 milliLiter(s) Nebulizer every 6 hours  aMIOdarone    Tablet 200 milliGRAM(s) Oral daily  amLODIPine   Tablet 5 milliGRAM(s) Oral daily  aspirin enteric coated 81 milliGRAM(s) Oral daily  atorvastatin 40 milliGRAM(s) Oral at bedtime  carvedilol 6.25 milliGRAM(s) Oral every 12 hours  clopidogrel Tablet 75 milliGRAM(s) Oral daily  enoxaparin Injectable 40 milliGRAM(s) SubCutaneous every 24 hours  furosemide    Tablet 20 milliGRAM(s) Oral daily  oseltamivir 75 milliGRAM(s) Oral two times a day  sacubitril 49 mG/valsartan 51 mG 1 Tablet(s) Oral two times a day    MEDICATIONS  (PRN):  acetaminophen     Tablet .. 650 milliGRAM(s) Oral every 6 hours PRN Temp greater or equal to 38C (100.4F), Mild Pain (1 - 3)  aluminum hydroxide/magnesium hydroxide/simethicone Suspension 30 milliLiter(s) Oral every 4 hours PRN Dyspepsia  melatonin 3 milliGRAM(s) Oral at bedtime PRN Insomnia  ondansetron Injectable 4 milliGRAM(s) IV Push every 8 hours PRN Nausea and/or Vomiting        I&O's Summary      PHYSICAL EXAM:  Vital Signs Last 24 Hrs  T(C): 36.8 (28 Apr 2022 10:42), Max: 37.1 (27 Apr 2022 23:36)  T(F): 98.2 (28 Apr 2022 10:42), Max: 98.7 (27 Apr 2022 23:36)  HR: 70 (28 Apr 2022 14:14) (60 - 70)  BP: 101/60 (28 Apr 2022 10:42) (101/60 - 125/67)  BP(mean): --  RR: 18 (28 Apr 2022 10:42) (18 - 25)  SpO2: 98% (28 Apr 2022 10:42) (90% - 98%)        CONSTITUTIONAL: NAD, malnourished   ENMT: Moist oral mucosa, no pharyngeal injection or exudates; normal dentition; No JVD  RESPIRATORY: Normal respiratory effort; lungs are  coarse b/l, no wheezing or prolong expiratory phase.   CARDIOVASCULAR: Regular rate and rhythm, normal S1 and S2, systolic murmur at LUSB 2/6; No lower extremity edema; Peripheral pulses are 2+ bilaterally  ABDOMEN: Nontender to palpation, normoactive bowel sounds, no rebound/guarding; No hepatosplenomegaly  MUSCLOSKELETAL:  no clubbing or cyanosis of digits; no joint swelling or tenderness to palpation  PSYCH: A+O to person, place, and time; affect appropriate  NEUROLOGY: CN 2-12 are intact and symmetric; no gross sensory deficits; was observed moving all 4 ext against gravity cooperating with exam.   SKIN: atrophic skin, seboreic keratosis all over    LABS:                        15.1   15.45 )-----------( 178      ( 28 Apr 2022 06:17 )             43.4     04-28    142  |  102  |  18.7  ----------------------------<  146<H>  2.6<LL>   |  25.0  |  0.74    Ca    8.3<L>      28 Apr 2022 06:17  Phos  2.9     04-28  Mg     2.4     04-28    TPro  5.8<L>  /  Alb  2.7<L>  /  TBili  0.8  /  DBili  x   /  AST  44<H>  /  ALT  48<H>  /  AlkPhos  80  04-28      CARDIAC MARKERS ( 28 Apr 2022 06:17 )  x     / <0.01 ng/mL / 162 U/L / x     / 1.8 ng/mL  CARDIAC MARKERS ( 27 Apr 2022 13:09 )  x     / <0.01 ng/mL / x     / x     / x              CAPILLARY BLOOD GLUCOSE            RADIOLOGY & ADDITIONAL TESTS:  Results Reviewed:   Imaging Personally Reviewed:  Electrocardiogram Personally Reviewed:
Sydnee Atwood M.D.    Patient is a 76y old  Male who presents with a chief complaint of AHRF 2/2 Viral PNA / Influenza A r/o ARDS (15 May 2022 13:52)      SUBJECTIVE / OVERNIGHT EVENTS: no concerns.     Patient denies chest pain, SOB, abd pain, N/V, fever, chills, dysuria or any other complaints. All remainder ROS negative.     MEDICATIONS  (STANDING):  ALBUTerol    90 MICROgram(s) HFA Inhaler 2 Puff(s) Inhalation every 6 hours  aMIOdarone    Tablet 200 milliGRAM(s) Oral daily  amLODIPine   Tablet 5 milliGRAM(s) Oral daily  aspirin enteric coated 81 milliGRAM(s) Oral daily  atorvastatin 40 milliGRAM(s) Oral at bedtime  carvedilol 6.25 milliGRAM(s) Oral every 12 hours  clopidogrel Tablet 75 milliGRAM(s) Oral daily  enoxaparin Injectable 40 milliGRAM(s) SubCutaneous every 24 hours  fluconAZOLE   Tablet 150 milliGRAM(s) Oral daily  furosemide    Tablet 20 milliGRAM(s) Oral daily  methylPREDNISolone sodium succinate Injectable 40 milliGRAM(s) IV Push every 8 hours  nystatin    Suspension 560800 Unit(s) Swish and Swallow four times a day  pantoprazole    Tablet 40 milliGRAM(s) Oral before breakfast  saccharomyces boulardii 250 milliGRAM(s) Oral two times a day  sacubitril 24 mG/valsartan 26 mG 1 Tablet(s) Oral two times a day  senna 2 Tablet(s) Oral at bedtime    MEDICATIONS  (PRN):  acetaminophen     Tablet .. 650 milliGRAM(s) Oral every 6 hours PRN Temp greater or equal to 38C (100.4F), Mild Pain (1 - 3)  ALBUTerol    0.083% 2.5 milliGRAM(s) Nebulizer every 2 hours PRN Shortness of Breath and/or Wheezing  aluminum hydroxide/magnesium hydroxide/simethicone Suspension 30 milliLiter(s) Oral every 4 hours PRN Dyspepsia  guaifenesin/dextromethorphan Oral Liquid 10 milliLiter(s) Oral every 6 hours PRN Cough  melatonin 5 milliGRAM(s) Oral at bedtime PRN Insomnia  ondansetron Injectable 4 milliGRAM(s) IV Push every 8 hours PRN Nausea and/or Vomiting      I&O's Summary      PHYSICAL EXAM:  Vital Signs Last 24 Hrs  T(C): 36.8 (16 May 2022 04:21), Max: 36.8 (16 May 2022 04:21)  T(F): 98.2 (16 May 2022 04:21), Max: 98.2 (16 May 2022 04:21)  HR: 60 (16 May 2022 04:21) (54 - 60)  BP: 106/67 (16 May 2022 04:21) (90/52 - 106/67)  BP(mean): --  RR: 18 (16 May 2022 04:21) (17 - 19)  SpO2: 98% (16 May 2022 04:21) (94% - 98%)    CONSTITUTIONAL: NAD, elderly on 2.5L NC  ENMT: Moist oral mucosa, no pharyngeal injection or exudates; normal dentition  RESPIRATORY: Normal respiratory effort; lungs are clear to auscultation bilaterally, on NC  CARDIOVASCULAR: Regular rate and rhythm, normal S1 and S2, no murmur/rub/gallop; Peripheral pulses are 2+ bilaterally  ABDOMEN: Nontender to palpation, normoactive bowel sounds, no rebound/guarding;   MUSCLOSKELETAL:  No clubbing or cyanosis of digits; no joint swelling or tenderness to palpation  PSYCH: A+O to person, place, and time; affect appropriate  NEUROLOGY: CN 2-12 are intact and symmetric; no gross sensory deficits;   SKIN: No rashes; no palpable lesions    LABS:                        14.5   20.80 )-----------( 158      ( 16 May 2022 06:20 )             43.1     05-16    140  |  103  |  30.8<H>  ----------------------------<  174<H>  4.4   |  26.0  |  0.75    Ca    8.7      16 May 2022 06:20  Phos  2.3     05-16  Mg     2.4     05-16    TPro  4.9<L>  /  Alb  2.6<L>  /  TBili  0.4  /  DBili  x   /  AST  15  /  ALT  36  /  AlkPhos  107  05-15              CAPILLARY BLOOD GLUCOSE          RADIOLOGY & ADDITIONAL TESTS:  Results Reviewed:   Imaging Personally Reviewed:  Electrocardiogram Personally Reviewed:
[facility] Division of Hospital Medicine    Chief Complaint:    Resp Failure    SUBJECTIVE / OVERNIGHT EVENTS:  No overnight events  Continuing abx  oxygen req improving     Patient denies chest pain, abd pain, N/V, fever, chills, dysuria or any other complaints. All remainder ROS negative.     MEDICATIONS  (STANDING):  ALBUTerol    90 MICROgram(s) HFA Inhaler 2 Puff(s) Inhalation every 6 hours  aMIOdarone    Tablet 200 milliGRAM(s) Oral daily  amLODIPine   Tablet 5 milliGRAM(s) Oral daily  aspirin enteric coated 81 milliGRAM(s) Oral daily  atorvastatin 40 milliGRAM(s) Oral at bedtime  carvedilol 6.25 milliGRAM(s) Oral every 12 hours  clopidogrel Tablet 75 milliGRAM(s) Oral daily  enoxaparin Injectable 40 milliGRAM(s) SubCutaneous every 24 hours  fluconAZOLE   Tablet 150 milliGRAM(s) Oral daily  furosemide    Tablet 20 milliGRAM(s) Oral daily  methylPREDNISolone sodium succinate Injectable 40 milliGRAM(s) IV Push every 8 hours  nystatin    Suspension 126575 Unit(s) Swish and Swallow four times a day  pantoprazole    Tablet 40 milliGRAM(s) Oral before breakfast  piperacillin/tazobactam IVPB.. 3.375 Gram(s) IV Intermittent every 8 hours  saccharomyces boulardii 250 milliGRAM(s) Oral two times a day  sacubitril 24 mG/valsartan 26 mG 1 Tablet(s) Oral two times a day  senna 2 Tablet(s) Oral at bedtime    MEDICATIONS  (PRN):  acetaminophen     Tablet .. 650 milliGRAM(s) Oral every 6 hours PRN Temp greater or equal to 38C (100.4F), Mild Pain (1 - 3)  ALBUTerol    0.083% 2.5 milliGRAM(s) Nebulizer every 2 hours PRN Shortness of Breath and/or Wheezing  aluminum hydroxide/magnesium hydroxide/simethicone Suspension 30 milliLiter(s) Oral every 4 hours PRN Dyspepsia  guaifenesin/dextromethorphan Oral Liquid 10 milliLiter(s) Oral every 6 hours PRN Cough  melatonin 5 milliGRAM(s) Oral at bedtime PRN Insomnia  ondansetron Injectable 4 milliGRAM(s) IV Push every 8 hours PRN Nausea and/or Vomiting        I&O's Summary    14 May 2022 07:01  -  15 May 2022 07:00  --------------------------------------------------------  IN: 0 mL / OUT: 500 mL / NET: -500 mL        PHYSICAL EXAM:  Vital Signs Last 24 Hrs  T(C): 36.1 (15 May 2022 11:35), Max: 36.6 (14 May 2022 16:51)  T(F): 97 (15 May 2022 11:35), Max: 97.9 (14 May 2022 16:51)  HR: 54 (15 May 2022 11:35) (54 - 60)  BP: 97/65 (15 May 2022 11:35) (97/65 - 103/69)  BP(mean): --  RR: 19 (15 May 2022 11:35) (18 - 19)  SpO2: 97% (15 May 2022 11:35) (93% - 97%)        CONSTITUTIONAL: NAD, elderly  ENMT: Moist oral mucosa, no pharyngeal injection or exudates; normal dentition  RESPIRATORY: Normal respiratory effort; lungs are clear to auscultation bilaterally, on NC  CARDIOVASCULAR: Regular rate and rhythm, normal S1 and S2, no murmur/rub/gallop; Peripheral pulses are 2+ bilaterally  ABDOMEN: Nontender to palpation, normoactive bowel sounds, no rebound/guarding;   MUSCLOSKELETAL:  No clubbing or cyanosis of digits; no joint swelling or tenderness to palpation  PSYCH: A+O to person, place, and time; affect appropriate  NEUROLOGY: CN 2-12 are intact and symmetric; no gross sensory deficits;   SKIN: No rashes; no palpable lesions    LABS:                        14.9   21.18 )-----------( 179      ( 15 May 2022 07:10 )             44.2     05-15    138  |  102  |  31.4<H>  ----------------------------<  151<H>  4.6   |  27.0  |  0.74    Ca    8.8      15 May 2022 07:10    TPro  4.9<L>  /  Alb  2.6<L>  /  TBili  0.4  /  DBili  x   /  AST  15  /  ALT  36  /  AlkPhos  107  05-15              CAPILLARY BLOOD GLUCOSE            RADIOLOGY & ADDITIONAL TESTS:  Results Reviewed:   Imaging Personally Reviewed:  Electrocardiogram Personally Reviewed:                                          
CC: AHRF 2/2 Viral PNA / Influenza A     INTERVAL HPI/OVERNIGHT EVENTS:  acute decompensation again overnight, required Hi flow Oxygen   was started on zosyn, solumedrol iv      Vital Signs Last 24 Hrs  T(C): 36.4 (10 May 2022 11:20), Max: 36.7 (10 May 2022 04:22)  T(F): 97.5 (10 May 2022 11:20), Max: 98 (10 May 2022 04:22)  HR: 68 (10 May 2022 11:20) (63 - 83)  BP: 104/66 (10 May 2022 11:20) (99/63 - 108/72)  BP(mean): --  RR: 20 (10 May 2022 11:20) (20 - 26)  SpO2: 92% (10 May 2022 11:20) (85% - 95%)    PHYSICAL EXAM:  General: on HI flow Nasal cannula Oxygen, poorly nourished   Eyes: PERRLA, EOMI; conjunctiva and sclera clear  Head: Normocephalic; atraumatic  Neck: Supple  Respiratory: CTAB, Rt lower lobe decreased breath sounds   Cardiovascular: Regular rate and rhythm. S1 and S2 Normal; No murmur  Extremities: Normal range of motion, No clubbing, cyanosis or edema  Neurological: Alert and oriented x4  Skin: Warm and dry. No acute rash  Psychiatric: Cooperative and appropriate      LABS:                                                15.9   23.28 )-----------( 299      ( 10 May 2022 10:05 )             47.1   05-10    133<L>  |  101  |  35.4<H>  ----------------------------<  184<H>  5.3   |  21.0<L>  |  0.83    Ca    9.1      10 May 2022 10:05          MEDICATIONS  (STANDING):  acetylcysteine 20%  Inhalation 4 milliLiter(s) Inhalation every 6 hours  ALBUTerol    0.083% 2.5 milliGRAM(s) Nebulizer every 6 hours  aMIOdarone    Tablet 200 milliGRAM(s) Oral daily  amLODIPine   Tablet 5 milliGRAM(s) Oral daily  aspirin enteric coated 81 milliGRAM(s) Oral daily  atorvastatin 40 milliGRAM(s) Oral at bedtime  carvedilol 6.25 milliGRAM(s) Oral every 12 hours  clopidogrel Tablet 75 milliGRAM(s) Oral daily  enoxaparin Injectable 40 milliGRAM(s) SubCutaneous every 24 hours  furosemide    Tablet 20 milliGRAM(s) Oral daily  methylPREDNISolone sodium succinate Injectable 40 milliGRAM(s) IV Push every 8 hours  piperacillin/tazobactam IVPB.. 3.375 Gram(s) IV Intermittent every 8 hours  saccharomyces boulardii 250 milliGRAM(s) Oral two times a day  sacubitril 24 mG/valsartan 26 mG 1 Tablet(s) Oral two times a day  senna 2 Tablet(s) Oral at bedtime    MEDICATIONS  (PRN):  acetaminophen     Tablet .. 650 milliGRAM(s) Oral every 6 hours PRN Temp greater or equal to 38C (100.4F), Mild Pain (1 - 3)  ALBUTerol    0.083% 2.5 milliGRAM(s) Nebulizer every 2 hours PRN Shortness of Breath and/or Wheezing  aluminum hydroxide/magnesium hydroxide/simethicone Suspension 30 milliLiter(s) Oral every 4 hours PRN Dyspepsia  guaifenesin/dextromethorphan Oral Liquid 10 milliLiter(s) Oral every 6 hours PRN Cough  melatonin 5 milliGRAM(s) Oral at bedtime PRN Insomnia  ondansetron Injectable 4 milliGRAM(s) IV Push every 8 hours PRN Nausea and/or Vomiting      
CC: AHRF 2/2 Viral PNA / Influenza A     INTERVAL HPI/OVERNIGHT EVENTS:  acute decompensation on 5/10, required Hi flow Oxygen - weaned off to 5lit NC oxygen this am, desats to high 80s with weaning, feels somewhat better   feels okay  difficulty swallowing, oral thrush       Vital Signs Last 24 Hrs  T(C): 36.3 (13 May 2022 11:27), Max: 36.5 (12 May 2022 17:36)  T(F): 97.4 (13 May 2022 11:27), Max: 97.7 (12 May 2022 17:36)  HR: 60 (13 May 2022 11:27) (60 - 65)  BP: 92/57 (13 May 2022 11:27) (92/57 - 107/68)  BP(mean): --  RR: 20 (13 May 2022 11:27) (19 - 20)  SpO2: 90% (13 May 2022 11:27) (90% - 96%)      PHYSICAL EXAM:  General: on  Nasal cannula Oxygen, poorly nourished   Eyes: PERRLA, EOMI; conjunctiva and sclera clear. oral thrush+  Head: Normocephalic; atraumatic  Neck: Supple  Respiratory: CTAB, Rt lower lobe decreased breath sounds   Cardiovascular: Regular rate and rhythm. S1 and S2 Normal; No murmur  Extremities: Normal range of motion, No clubbing, cyanosis or edema  Neurological: Alert and oriented x4  Skin: Warm and dry. No acute rash  Psychiatric: Cooperative and appropriate      LABS:                              MEDICATIONS  (STANDING):  ALBUTerol    90 MICROgram(s) HFA Inhaler 2 Puff(s) Inhalation every 6 hours  aMIOdarone    Tablet 200 milliGRAM(s) Oral daily  amLODIPine   Tablet 5 milliGRAM(s) Oral daily  aspirin enteric coated 81 milliGRAM(s) Oral daily  atorvastatin 40 milliGRAM(s) Oral at bedtime  carvedilol 6.25 milliGRAM(s) Oral every 12 hours  clopidogrel Tablet 75 milliGRAM(s) Oral daily  enoxaparin Injectable 40 milliGRAM(s) SubCutaneous every 24 hours  fluconAZOLE   Tablet 150 milliGRAM(s) Oral daily  furosemide    Tablet 20 milliGRAM(s) Oral daily  methylPREDNISolone sodium succinate Injectable 40 milliGRAM(s) IV Push every 8 hours  nystatin    Suspension [id number] Unit(s) Swish and Swallow four times a day  pantoprazole    Tablet 40 milliGRAM(s) Oral before breakfast  piperacillin/tazobactam IVPB.. 3.375 Gram(s) IV Intermittent every 8 hours  saccharomyces boulardii 250 milliGRAM(s) Oral two times a day  sacubitril 24 mG/valsartan 26 mG 1 Tablet(s) Oral two times a day  senna 2 Tablet(s) Oral at bedtime    MEDICATIONS  (PRN):  acetaminophen     Tablet .. 650 milliGRAM(s) Oral every 6 hours PRN Temp greater or equal to 38C (100.4F), Mild Pain (1 - 3)  ALBUTerol    0.083% 2.5 milliGRAM(s) Nebulizer every 2 hours PRN Shortness of Breath and/or Wheezing  aluminum hydroxide/magnesium hydroxide/simethicone Suspension 30 milliLiter(s) Oral every 4 hours PRN Dyspepsia  guaifenesin/dextromethorphan Oral Liquid 10 milliLiter(s) Oral every 6 hours PRN Cough  melatonin 5 milliGRAM(s) Oral at bedtime PRN Insomnia  ondansetron Injectable 4 milliGRAM(s) IV Push every 8 hours PRN Nausea and/or Vomiting      
CC: AHRF 2/2 Viral PNA / Influenza A r/o ARDS (05 May 2022 10:17)    INTERVAL HPI/OVERNIGHT EVENTS:  no acute events overnight - still feels SOB - about 50% better as compared to admission  desated to 80s, back to 5lit NC       Vital Signs Last 24 Hrs  T(C): 36.3 (06 May 2022 11:19), Max: 36.6 (05 May 2022 17:38)  T(F): 97.4 (06 May 2022 11:19), Max: 97.9 (06 May 2022 04:48)  HR: 63 (06 May 2022 11:19) (60 - 72)  BP: 113/65 (06 May 2022 11:19) (102/63 - 113/65)  BP(mean): --  RR: 19 (06 May 2022 11:19) (18 - 19)  SpO2: 93% (06 May 2022 11:19) (90% - 95%)      PHYSICAL EXAM:  General: in no acute distress, poorly nourished   Eyes: PERRLA, EOMI; conjunctiva and sclera clear  Head: Normocephalic; atraumatic  ENMT: No nasal discharge; airway clear  Neck: Supple; non tender; no masses  Respiratory: No wheezes, rales or rhonchi  Cardiovascular: Regular rate and rhythm. S1 and S2 Normal; No murmur  Gastrointestinal: Soft non-tender non-distended; Normal bowel sounds  Extremities: Normal range of motion, No clubbing, cyanosis or edema  Neurological: Alert and oriented x4  Skin: Warm and dry. No acute rash  Psychiatric: Cooperative and appropriate      LABS:                          14.9   21.30 )-----------( 388      ( 05 May 2022 07:27 )             44.9   05-05    139  |  101  |  31.3<H>  ----------------------------<  161<H>  4.6   |  27.0  |  0.72    Ca    9.2      05 May 2022 07:27  Phos  2.7     05-05  Mg     2.5     05-05      MEDICATIONS  (STANDING):  albuterol/ipratropium for Nebulization 3 milliLiter(s) Nebulizer every 6 hours  aMIOdarone    Tablet 200 milliGRAM(s) Oral daily  amLODIPine   Tablet 5 milliGRAM(s) Oral daily  aspirin enteric coated 81 milliGRAM(s) Oral daily  atorvastatin 40 milliGRAM(s) Oral at bedtime  benzonatate 100 milliGRAM(s) Oral three times a day  carvedilol 6.25 milliGRAM(s) Oral every 12 hours  clopidogrel Tablet 75 milliGRAM(s) Oral daily  enoxaparin Injectable 40 milliGRAM(s) SubCutaneous every 24 hours  methylPREDNISolone sodium succinate Injectable 40 milliGRAM(s) IV Push every 8 hours  piperacillin/tazobactam IVPB.. 3.375 Gram(s) IV Intermittent every 8 hours  sacubitril 24 mG/valsartan 26 mG 1 Tablet(s) Oral two times a day    MEDICATIONS  (PRN):  acetaminophen     Tablet .. 650 milliGRAM(s) Oral every 6 hours PRN Temp greater or equal to 38C (100.4F), Mild Pain (1 - 3)  ALBUTerol    0.083% 2.5 milliGRAM(s) Nebulizer every 2 hours PRN Shortness of Breath and/or Wheezing  aluminum hydroxide/magnesium hydroxide/simethicone Suspension 30 milliLiter(s) Oral every 4 hours PRN Dyspepsia  guaifenesin/dextromethorphan Oral Liquid 10 milliLiter(s) Oral every 6 hours PRN Cough  melatonin 5 milliGRAM(s) Oral at bedtime PRN Insomnia  ondansetron Injectable 4 milliGRAM(s) IV Push every 8 hours PRN Nausea and/or Vomiting      
Sydnee Atwood M.D.    Patient is a 76y old  Male who presents with a chief complaint of AHRF 2/2 Viral PNA / Influenza A r/o ARDS (03 May 2022 09:57)      SUBJECTIVE / OVERNIGHT EVENTS: no event overnight. Feels about the same as before, same SOB.     Patient denies chest pain, abd pain, N/V, fever, chills, dysuria or any other complaints. All remainder ROS negative.     MEDICATIONS  (STANDING):  albuterol/ipratropium for Nebulization 3 milliLiter(s) Nebulizer every 6 hours  aMIOdarone    Tablet 200 milliGRAM(s) Oral daily  amLODIPine   Tablet 5 milliGRAM(s) Oral daily  aspirin enteric coated 81 milliGRAM(s) Oral daily  atorvastatin 40 milliGRAM(s) Oral at bedtime  azithromycin  IVPB 500 milliGRAM(s) IV Intermittent every 24 hours  benzonatate 100 milliGRAM(s) Oral three times a day  carvedilol 6.25 milliGRAM(s) Oral every 12 hours  clopidogrel Tablet 75 milliGRAM(s) Oral daily  enoxaparin Injectable 40 milliGRAM(s) SubCutaneous every 24 hours  methylPREDNISolone sodium succinate Injectable 40 milliGRAM(s) IV Push every 8 hours  piperacillin/tazobactam IVPB.. 3.375 Gram(s) IV Intermittent every 8 hours  sacubitril 24 mG/valsartan 26 mG 1 Tablet(s) Oral two times a day    MEDICATIONS  (PRN):  acetaminophen     Tablet .. 650 milliGRAM(s) Oral every 6 hours PRN Temp greater or equal to 38C (100.4F), Mild Pain (1 - 3)  ALBUTerol    0.083% 2.5 milliGRAM(s) Nebulizer every 2 hours PRN Shortness of Breath and/or Wheezing  aluminum hydroxide/magnesium hydroxide/simethicone Suspension 30 milliLiter(s) Oral every 4 hours PRN Dyspepsia  guaifenesin/dextromethorphan Oral Liquid 10 milliLiter(s) Oral every 6 hours PRN Cough  melatonin 5 milliGRAM(s) Oral at bedtime PRN Insomnia  ondansetron Injectable 4 milliGRAM(s) IV Push every 8 hours PRN Nausea and/or Vomiting      I&O's Summary    02 May 2022 07:01  -  03 May 2022 07:00  --------------------------------------------------------  IN: 0 mL / OUT: 400 mL / NET: -400 mL        PHYSICAL EXAM:  Vital Signs Last 24 Hrs  T(C): 36.7 (03 May 2022 04:50), Max: 36.7 (03 May 2022 04:50)  T(F): 98 (03 May 2022 04:50), Max: 98 (03 May 2022 04:50)  HR: 58 (03 May 2022 08:24) (58 - 68)  BP: 100/66 (03 May 2022 04:50) (100/66 - 104/61)  BP(mean): --  RR: 18 (03 May 2022 08:24) (16 - 22)  SpO2: 93% (03 May 2022 08:24) (93% - 95%)    CONSTITUTIONAL: NAD, well-groomed. On HFNC  ENMT: Moist oral mucosa, no pharyngeal injection or exudate  RESPIRATORY: Diffuse bronchial breath sound with occasional ronchil noted   CARDIOVASCULAR: Regular rate and rhythm, normal S1 and S2, no murmur/rub/gallop; No lower extremity edema  ABDOMEN: Nontender to palpation, normoactive bowel sounds, no rebound/guarding; No hepatosplenomegaly  MUSCLOSKELETAL:  Normal gait; no clubbing or cyanosis of digits; no joint swelling or tenderness to palpation  PSYCH: A+O x3; affect appropriate  NEUROLOGY: CN 2-12 are intact and symmetric; no gross sensory deficits;   SKIN: No rashes; no palpable lesions    LABS:                        14.3   21.76 )-----------( 335      ( 03 May 2022 05:42 )             41.9     05-03    138  |  101  |  28.3<H>  ----------------------------<  164<H>  4.3   |  26.0  |  0.70    Ca    8.8      03 May 2022 05:42  Phos  2.3     05-03  Mg     2.3     05-03      CAPILLARY BLOOD GLUCOSE      RADIOLOGY & ADDITIONAL TESTS:  Results Reviewed:   Imaging Personally Reviewed:  Electrocardiogram Personally Reviewed:
Sydnee Atwood M.D.    Patient is a 76y old  Male who presents with a chief complaint of AHRF 2/2 Viral PNA / Influenza A r/o ARDS (03 May 2022 10:27)      SUBJECTIVE / OVERNIGHT EVENTS: feels about the same as before. Continues to have SOB. HFNC 40L 70%.    Patient denies chest pain, abd pain, N/V, fever, chills, dysuria or any other complaints. All remainder ROS negative.     MEDICATIONS  (STANDING):  albuterol/ipratropium for Nebulization 3 milliLiter(s) Nebulizer every 6 hours  aMIOdarone    Tablet 200 milliGRAM(s) Oral daily  amLODIPine   Tablet 5 milliGRAM(s) Oral daily  aspirin enteric coated 81 milliGRAM(s) Oral daily  atorvastatin 40 milliGRAM(s) Oral at bedtime  benzonatate 100 milliGRAM(s) Oral three times a day  carvedilol 6.25 milliGRAM(s) Oral every 12 hours  clopidogrel Tablet 75 milliGRAM(s) Oral daily  enoxaparin Injectable 40 milliGRAM(s) SubCutaneous every 24 hours  methylPREDNISolone sodium succinate Injectable 40 milliGRAM(s) IV Push every 8 hours  piperacillin/tazobactam IVPB.. 3.375 Gram(s) IV Intermittent every 8 hours  sacubitril 24 mG/valsartan 26 mG 1 Tablet(s) Oral two times a day    MEDICATIONS  (PRN):  acetaminophen     Tablet .. 650 milliGRAM(s) Oral every 6 hours PRN Temp greater or equal to 38C (100.4F), Mild Pain (1 - 3)  ALBUTerol    0.083% 2.5 milliGRAM(s) Nebulizer every 2 hours PRN Shortness of Breath and/or Wheezing  aluminum hydroxide/magnesium hydroxide/simethicone Suspension 30 milliLiter(s) Oral every 4 hours PRN Dyspepsia  guaifenesin/dextromethorphan Oral Liquid 10 milliLiter(s) Oral every 6 hours PRN Cough  melatonin 5 milliGRAM(s) Oral at bedtime PRN Insomnia  ondansetron Injectable 4 milliGRAM(s) IV Push every 8 hours PRN Nausea and/or Vomiting      I&O's Summary    03 May 2022 07:01  -  04 May 2022 07:00  --------------------------------------------------------  IN: 0 mL / OUT: 700 mL / NET: -700 mL        PHYSICAL EXAM:  Vital Signs Last 24 Hrs  T(C): 36.4 (04 May 2022 04:41), Max: 36.6 (03 May 2022 16:35)  T(F): 97.6 (04 May 2022 04:41), Max: 97.9 (03 May 2022 16:35)  HR: 58 (04 May 2022 04:41) (58 - 64)  BP: 110/75 (04 May 2022 04:41) (98/63 - 112/66)  BP(mean): --  RR: 18 (04 May 2022 04:41) (18 - 18)  SpO2: 96% (04 May 2022 04:41) (91% - 96%)    CONSTITUTIONAL: NAD, well-groomed. On HFNC  ENMT: Moist oral mucosa, no pharyngeal injection or exudate  RESPIRATORY: Diffuse bronchial breath sound with occasional ronchil noted   CARDIOVASCULAR: Regular rate and rhythm, normal S1 and S2, no murmur/rub/gallop; No lower extremity edema  ABDOMEN: Nontender to palpation, normoactive bowel sounds, no rebound/guarding; No hepatosplenomegaly  MUSCLOSKELETAL:  Normal gait; no clubbing or cyanosis of digits; no joint swelling or tenderness to palpation  PSYCH: A+O x3; affect appropriate  NEUROLOGY: CN 2-12 are intact and symmetric; no gross sensory deficits;   SKIN: No rashes; no palpable lesions    LABS:                        13.9   20.83 )-----------( 350      ( 04 May 2022 06:04 )             42.3     05-04    141  |  103  |  29.9<H>  ----------------------------<  172<H>  4.5   |  26.0  |  0.66    Ca    9.0      04 May 2022 06:04  Phos  2.8     05-04  Mg     2.3     05-04          CAPILLARY BLOOD GLUCOSE      RADIOLOGY & ADDITIONAL TESTS:  Results Reviewed:   Imaging Personally Reviewed:  Electrocardiogram Personally Reviewed:
Sydnee Atwood M.D.    Patient is a 76y old  Male who presents with a chief complaint of AHRF 2/2 Viral PNA / Influenza A r/o ARDS (16 May 2022 11:05)      SUBJECTIVE / OVERNIGHT EVENTS: feel well. No concerns.     Patient denies chest pain, SOB, abd pain, N/V, fever, chills, dysuria or any other complaints. All remainder ROS negative.     MEDICATIONS  (STANDING):  ALBUTerol    90 MICROgram(s) HFA Inhaler 2 Puff(s) Inhalation every 6 hours  aMIOdarone    Tablet 200 milliGRAM(s) Oral daily  amLODIPine   Tablet 5 milliGRAM(s) Oral daily  aspirin enteric coated 81 milliGRAM(s) Oral daily  atorvastatin 40 milliGRAM(s) Oral at bedtime  carvedilol 6.25 milliGRAM(s) Oral every 12 hours  clopidogrel Tablet 75 milliGRAM(s) Oral daily  enoxaparin Injectable 40 milliGRAM(s) SubCutaneous every 24 hours  fluconAZOLE   Tablet 150 milliGRAM(s) Oral daily  furosemide    Tablet 20 milliGRAM(s) Oral daily  methylPREDNISolone sodium succinate Injectable 40 milliGRAM(s) IV Push every 12 hours  nystatin    Suspension 537174 Unit(s) Swish and Swallow four times a day  pantoprazole    Tablet 40 milliGRAM(s) Oral before breakfast  saccharomyces boulardii 250 milliGRAM(s) Oral two times a day  sacubitril 24 mG/valsartan 26 mG 1 Tablet(s) Oral two times a day  senna 2 Tablet(s) Oral at bedtime    MEDICATIONS  (PRN):  acetaminophen     Tablet .. 650 milliGRAM(s) Oral every 6 hours PRN Temp greater or equal to 38C (100.4F), Mild Pain (1 - 3)  ALBUTerol    0.083% 2.5 milliGRAM(s) Nebulizer every 2 hours PRN Shortness of Breath and/or Wheezing  aluminum hydroxide/magnesium hydroxide/simethicone Suspension 30 milliLiter(s) Oral every 4 hours PRN Dyspepsia  guaifenesin/dextromethorphan Oral Liquid 10 milliLiter(s) Oral every 6 hours PRN Cough  melatonin 5 milliGRAM(s) Oral at bedtime PRN Insomnia  ondansetron Injectable 4 milliGRAM(s) IV Push every 8 hours PRN Nausea and/or Vomiting      I&O's Summary      PHYSICAL EXAM:  Vital Signs Last 24 Hrs  T(C): 36.6 (17 May 2022 04:35), Max: 36.6 (17 May 2022 04:35)  T(F): 97.9 (17 May 2022 04:35), Max: 97.9 (17 May 2022 04:35)  HR: 55 (17 May 2022 04:35) (55 - 62)  BP: 109/63 (17 May 2022 04:35) (102/64 - 112/68)  BP(mean): --  RR: 19 (17 May 2022 04:35) (19 - 20)  SpO2: 92% (17 May 2022 04:35) (92% - 100%)    CONSTITUTIONAL: NAD, elderly on 2.5L NC  ENMT: Moist oral mucosa, no pharyngeal injection or exudates; normal dentition  RESPIRATORY: Normal respiratory effort; lungs are clear to auscultation bilaterally, on NC  CARDIOVASCULAR: Regular rate and rhythm, normal S1 and S2, no murmur/rub/gallop; Peripheral pulses are 2+ bilaterally  ABDOMEN: Nontender to palpation, normoactive bowel sounds, no rebound/guarding;   MUSCLOSKELETAL:  No clubbing or cyanosis of digits; no joint swelling or tenderness to palpation  PSYCH: A+O to person, place, and time; affect appropriate  NEUROLOGY: CN 2-12 are intact and symmetric; no gross sensory deficits;   SKIN: No rashes; no palpable lesions    LABS:                        14.5   19.60 )-----------( 135      ( 17 May 2022 06:16 )             42.8     05-17    137  |  101  |  32.6<H>  ----------------------------<  168<H>  4.6   |  26.0  |  0.67    Ca    8.6      17 May 2022 06:16  Phos  2.6     05-17  Mg     2.3     05-17                CAPILLARY BLOOD GLUCOSE          RADIOLOGY & ADDITIONAL TESTS:  Results Reviewed:   Imaging Personally Reviewed:  Electrocardiogram Personally Reviewed:
                                           [facility]                                                INFECTIOUS DISEASES  =======================================================                               Tyron Concepcion MD#  Shay Low MD*                                     Angela Gloria MD*    Morenita Khalil MD*            Diplomates American Board of Internal Medicine & Infectious Diseases                  # [place] Office - Appt - Tel  [phone] Fax [phone]                * [place] Office - Appt - Tel [phone] Fax [phone]                                  Hospital Consult line:  [phone]  =======================================================  [id number]  GALI KELLEY   follow up: shortness of breath, mucus plugging    patient seen this AM.   reports less SOB  not tolerating HFNC and putting it on his forehead, during breakfast.      I have personally reviewed the labs and data; pertinent labs and data are listed in this note; please see below.   =======================================================  Past Medical & Surgical Hx:  =====================  PAST MEDICAL & SURGICAL HISTORY:  HTN (hypertension)  High cholesterol  Ejection fraction &lt; 50%  AICD (automatic cardioverter/defibrillator) present  History of total hip replacement, right  S/P cholecystectomy  Abdominal hernia    Problem List:  ==========  HEALTH ISSUES - PROBLEM Dx:  Ejection fraction &lt; 50%  Pulmonary infiltrates  Acute respiratory failure with hypoxia  Mucus plugging of bronchi  Pneumonia due to influenza A virus     Social Hx:  =======  no toxic habits currently  former smoker of tobacco and marijuana  denies IVDU    FAMILY HISTORY:  No pertinent family history in first degree relatives    no significant family history of immunosuppressive disorders in mother or father   =======================================================    REVIEW OF SYSTEMS:  CONSTITUTIONAL:  No Fever or chills  HEENT:  No diplopia or blurred vision.  No earache, sore throat or runny nose.  CARDIOVASCULAR:  No pressure, squeezing, strangling, tightness, heaviness or aching about the chest, neck, axilla or epigastrium.  RESPIRATORY:  + shortness of breath  GASTROINTESTINAL:  No nausea, vomiting or diarrhea.  GENITOURINARY:  No dysuria, frequency or urgency. No Blood in urine  MUSCULOSKELETAL:  no joint aches, no muscle pain  SKIN:  No change in skin, hair or nails.  NEUROLOGIC:  No Headaches, seizures or weakness.  PSYCHIATRIC:  No disorder of thought or mood.  ENDOCRINE:  No heat or cold intolerance  HEMATOLOGICAL:  No easy bruising or bleeding.    =======================================================  Allergies  No Known Allergies       ======================================================  Physical Exam:  ============     General:  No acute distress.  Eye: Pupils are equal, round and reactive to light, Extraocular movements are intact, Normal conjunctiva.  HENT: Normocephalic, Oral mucosa is moist, No pharyngeal erythema, No sinus tenderness.  Neck: Supple, No lymphadenopathy.  Respiratory: Lungs with poor aeration  + tachypnea  Cardiovascular: Normal rate, Regular rhythm,   Gastrointestinal: Soft, Non-tender, Non-distended, Normal bowel sounds.  Genitourinary: No costovertebral angle tenderness.  Lymphatics: No lymphadenopathy neck,   Musculoskeletal: Normal range of motion, Normal strength.  Integumentary: No rash.  Neurologic: Alert, Oriented, No focal deficits, Cranial Nerves II-XII are grossly intact.  Psychiatric: Appropriate mood & affect.    =======================================================  Vitals:  ============  T(F): 97.6 (12 May 2022 04:35), Max: 98.3 (11 May 2022 16:58)  HR: 92 (12 May 2022 04:46)  BP: 105/67 (12 May 2022 04:35)  RR: 20 (12 May 2022 08:50)  SpO2: 94% (12 May 2022 08:50) (93% - 97%)  temp max in last 48H T(F): , Max: 98.3 (05-11-22 @ 16:58)    =======================================================  Current Antibiotics:  piperacillin/tazobactam IVPB.. 3.375 Gram(s) IV Intermittent every 8 hours    Other medications:  ALBUTerol    90 MICROgram(s) HFA Inhaler 2 Puff(s) Inhalation every 6 hours  aMIOdarone    Tablet 200 milliGRAM(s) Oral daily  amLODIPine   Tablet 5 milliGRAM(s) Oral daily  aspirin enteric coated 81 milliGRAM(s) Oral daily  atorvastatin 40 milliGRAM(s) Oral at bedtime  carvedilol 6.25 milliGRAM(s) Oral every 12 hours  clopidogrel Tablet 75 milliGRAM(s) Oral daily  enoxaparin Injectable 40 milliGRAM(s) SubCutaneous every 24 hours  furosemide    Tablet 20 milliGRAM(s) Oral daily  methylPREDNISolone sodium succinate Injectable 40 milliGRAM(s) IV Push every 8 hours  saccharomyces boulardii 250 milliGRAM(s) Oral two times a day  sacubitril 24 mG/valsartan 26 mG 1 Tablet(s) Oral two times a day  senna 2 Tablet(s) Oral at bedtime      =======================================================  Labs:                        15.9   23.28 )-----------( 299      ( 10 May 2022 10:05 )             47.1     05-10    133<L>  |  101  |  35.4<H>  ----------------------------<  184<H>  5.3   |  21.0<L>  |  0.83    Ca    9.1      10 May 2022 10:05        Culture - Sputum (collected 05-11-22 @ 21:57)  Source: .Sputum Sputum  Gram Stain (05-11-22 @ 23:33):    Rare polymorphonuclear leukocytes per low power field    Moderate Squamous epithelial cells per low power field    Numerous Yeast like cells per oil power field    Rare Gram Negative Rods per oil power field    Rare Gram Positive Cocci in Pairs and Chains per oil power field    Rare Gram Positive Rods per oil power field    Culture - Blood (collected 05-10-22 @ 07:20)  Source: .Blood Blood-Peripheral    Culture - Blood (collected 05-10-22 @ 07:18)  Source: .Blood Blood-Peripheral      Procalcitonin, Serum: 0.10 ng/mL (05-10-22 @ 10:05)    SARS-CoV-2: NotDetec (05-10-22 @ 02:43)  COVID-19 PCR: NotDetec (05-01-22 @ 10:15)  SARS-CoV-2: NotDetec (04-27-22 @ 13:08)      =======================================================    ===============  < from: CT Angio Chest PE Protocol w/ IV Cont (05.10.22 @ 09:53) >    ACC: [id number] EXAM:  CT ANGIO CHEST PULM [facility]                          PROCEDURE DATE:  05/10/2022        INTERPRETATION:  INDICATION: Pneumonia, CHF, sudden hypoxia, rule out pulmonary embolism    TECHNIQUE: Volumetric images of the chestwere obtained after the administration of 60 mL of Omnipaque 350. Maximum intensity projection images were generated.    COMPARISON: CT chest 5/4/2022.    FINDINGS:    PULMONARY ANGIOGRAM:  No pulmonary embolism. Normal caliber pulmonary artery.    LUNGS/AIRWAYS/PLEURA: New mucous plug occluding the bronchus intermedius and extending into the middle and right lower lobe bronchi, and multiple segmental bronchi in the right lower lobe. New middle lobar collapse and ill-defined opacitiesin the lower lobes. Stable diffuse groundglass in the upper lobes. Unremarkable pleura.    LYMPH NODES/MEDIASTINUM: No enlarged lymph nodes.    HEART/VASCULATURE: Cardiomegaly. No pericardial effusion. Stable 4.6 cm midascending aorta. Coronary artery calcifications. ICD lead in the right ventricle.    UPPER ABDOMEN: Stable few too small to characterize hypodensities in the liver. Cholecystectomy. Left renal cyst.    BONES/SOFT TISSUES: No acute abnormality.      IMPRESSION:  No pulmonary embolism.  Since 6 days ago:  New mucous plug occluding the bronchus intermedius and middle and right lower lobar bronchi, middle lobar collapse, and pneumonia and/or aspiration in the lower lobes.  Stable groundglass in the upper lobes, compatible with sequela of prior pneumonia/acute lung injury that was present 2 weeks prior.      --- End of Report ---      KRYSTAL SIM M.D., ATTENDING RADIOLOGIST  This document has been electronically signed. May 10 2022 10:07AM    < end of copied text >     
CC: AHRF 2/2 Viral PNA / Influenza A r/o ARDS (05 May 2022 10:17)    INTERVAL HPI/OVERNIGHT EVENTS:  no acute events overnight - still feels SOB, about the same   ambulated some, feels he has unsteady gait   on 6lit NC     Vital Signs Last 24 Hrs  T(C): 36.6 (07 May 2022 17:37), Max: 36.6 (07 May 2022 17:37)  T(F): 97.9 (07 May 2022 17:37), Max: 97.9 (07 May 2022 17:37)  HR: 64 (08 May 2022 09:24) (55 - 90)  BP: 117/67 (08 May 2022 04:57) (106/72 - 117/67)  BP(mean): --  RR: 18 (08 May 2022 04:57) (18 - 20)  SpO2: 99% (08 May 2022 09:24) (92% - 99%)      PHYSICAL EXAM:  General: in no acute distress, poorly nourished   Eyes: PERRLA, EOMI; conjunctiva and sclera clear  Head: Normocephalic; atraumatic  ENMT: No nasal discharge; airway clear  Neck: Supple; non tender; no masses  Respiratory: bilateral rhonchi  Cardiovascular: Regular rate and rhythm. S1 and S2 Normal; No murmur  Gastrointestinal: Soft non-tender non-distended; Normal bowel sounds  Extremities: Normal range of motion, No clubbing, cyanosis or edema  Neurological: Alert and oriented x4  Skin: Warm and dry. No acute rash  Psychiatric: Cooperative and appropriate      LABS:      MEDICATIONS  (STANDING):  albuterol/ipratropium for Nebulization 3 milliLiter(s) Nebulizer every 6 hours  aMIOdarone    Tablet 200 milliGRAM(s) Oral daily  amLODIPine   Tablet 5 milliGRAM(s) Oral daily  aspirin enteric coated 81 milliGRAM(s) Oral daily  atorvastatin 40 milliGRAM(s) Oral at bedtime  benzonatate 100 milliGRAM(s) Oral three times a day  carvedilol 6.25 milliGRAM(s) Oral every 12 hours  clopidogrel Tablet 75 milliGRAM(s) Oral daily  enoxaparin Injectable 40 milliGRAM(s) SubCutaneous every 24 hours  furosemide    Tablet 20 milliGRAM(s) Oral daily  methylPREDNISolone sodium succinate Injectable 40 milliGRAM(s) IV Push every 12 hours  sacubitril 24 mG/valsartan 26 mG 1 Tablet(s) Oral two times a day  senna 2 Tablet(s) Oral at bedtime    MEDICATIONS  (PRN):  acetaminophen     Tablet .. 650 milliGRAM(s) Oral every 6 hours PRN Temp greater or equal to 38C (100.4F), Mild Pain (1 - 3)  ALBUTerol    0.083% 2.5 milliGRAM(s) Nebulizer every 2 hours PRN Shortness of Breath and/or Wheezing  aluminum hydroxide/magnesium hydroxide/simethicone Suspension 30 milliLiter(s) Oral every 4 hours PRN Dyspepsia  guaifenesin/dextromethorphan Oral Liquid 10 milliLiter(s) Oral every 6 hours PRN Cough  melatonin 5 milliGRAM(s) Oral at bedtime PRN Insomnia  ondansetron Injectable 4 milliGRAM(s) IV Push every 8 hours PRN Nausea and/or Vomiting  
PULMONARY PROGRESS NOTE      GALI [id number]    Patient is a 76y old  Male who presents with a chief complaint of AHRF 2/2 Viral PNA / Influenza A r/o ARDS (05 May 2022 12:37)  76M with PMHX MI/CAD, HTN, HLD, CHFrEF, ICM s/p AICD presents to [facility] c/o SOB/CARL and Nonproductive Cough x1-2 days s/p failed outpt abx for PNA admitted for Acute Hypoxic Respiratory Failure 2/2 Viral PNA/Influenza and possible superimposed bacterial PNA  Acute Hypoxic Respiratory Failure 2/2 multifocal viral PNA from Influenza A  MI/CAD  HTN/HLD  Chronic CHFrEF  ICM s/p AICD      INTERVAL HPI/OVERNIGHT EVENTS:  Slow improvement  CARL       MEDICATIONS  (STANDING):  albuterol/ipratropium for Nebulization 3 milliLiter(s) Nebulizer every 6 hours  aMIOdarone    Tablet 200 milliGRAM(s) Oral daily  amLODIPine   Tablet 5 milliGRAM(s) Oral daily  aspirin enteric coated 81 milliGRAM(s) Oral daily  atorvastatin 40 milliGRAM(s) Oral at bedtime  benzonatate 100 milliGRAM(s) Oral three times a day  carvedilol 6.25 milliGRAM(s) Oral every 12 hours  clopidogrel Tablet 75 milliGRAM(s) Oral daily  enoxaparin Injectable 40 milliGRAM(s) SubCutaneous every 24 hours  methylPREDNISolone sodium succinate Injectable 40 milliGRAM(s) IV Push every 8 hours  piperacillin/tazobactam IVPB.. 3.375 Gram(s) IV Intermittent every 8 hours  sacubitril 24 mG/valsartan 26 mG 1 Tablet(s) Oral two times a day      MEDICATIONS  (PRN):  acetaminophen     Tablet .. 650 milliGRAM(s) Oral every 6 hours PRN Temp greater or equal to 38C (100.4F), Mild Pain (1 - 3)  ALBUTerol    0.083% 2.5 milliGRAM(s) Nebulizer every 2 hours PRN Shortness of Breath and/or Wheezing  aluminum hydroxide/magnesium hydroxide/simethicone Suspension 30 milliLiter(s) Oral every 4 hours PRN Dyspepsia  guaifenesin/dextromethorphan Oral Liquid 10 milliLiter(s) Oral every 6 hours PRN Cough  melatonin 5 milliGRAM(s) Oral at bedtime PRN Insomnia  ondansetron Injectable 4 milliGRAM(s) IV Push every 8 hours PRN Nausea and/or Vomiting      Allergies    No Known Allergies    Intolerances        PAST MEDICAL & SURGICAL HISTORY:  HTN (hypertension)    High cholesterol    Ejection fraction &lt; 50%    AICD (automatic cardioverter/defibrillator) present    History of total hip replacement, right    S/P cholecystectomy    Abdominal hernia        SOCIAL HISTORY  Smoking History:           Vital Signs Last 24 Hrs  T(C): 36.6 (06 May 2022 04:48), Max: 36.6 (05 May 2022 17:38)  T(F): 97.9 (06 May 2022 04:48), Max: 97.9 (06 May 2022 04:48)  HR: 67 (06 May 2022 08:38) (60 - 72)  BP: 112/67 (06 May 2022 04:48) (102/63 - 112/67)  BP(mean): --  RR: 19 (06 May 2022 04:48) (18 - 19)  SpO2: 93% (06 May 2022 08:38) (90% - 95%)    PHYSICAL EXAMINATION:    GENERAL: The patient is awake and alert in no apparent distress.     HEENT: Head is normocephalic and atraumatic.    NECK: Supple.    LUNGS: Basilar rales    HEART: Iregular rate and rhythm without murmur.    NEUROLOGIC: Grossly intact.    LABS:                        14.9   21.30 )-----------( 388      ( 05 May 2022 07:27 )             44.9     05-05    139  |  101  |  31.3<H>  ----------------------------<  161<H>  4.6   |  27.0  |  0.72    Ca    9.2      05 May 2022 07:27  Phos  2.7     05-05  Mg     2.5     05-05      RADIOLOGY & ADDITIONAL STUDIES:    CT Chest 5/4/22  COMPARISON: 4/27/2022.    FINDINGS:    LUNGS/AIRWAYS/PLEURA: Decreased bilateral diffuse groundglass. Unchanged   mild bronchial dilatation. Mild bilateral interlobular septal thickening   and intralobular lines. No endobronchial lesion. Nonobstructing   secretions within the distal bronchus. Unremarkable pleura.    LYMPH NODES/MEDIASTINUM: No enlarged lymph nodes.    HEART/VASCULATURE: Enlarged heart. No pericardial effusion. Appropriate   course of right ventricular ICD. Stable enlarged 4.6 ascending aorta.   Coronary artery calcifications.    UPPER ABDOMEN: Few too small to characterize hypodensities in the liver   and left kidney. Bilateral renal cysts. Cholecystectomy.    BONES/SOFT TISSUES: Degenerative changes of the spine. Bilateral   glenohumeral joint arthrosis.      IMPRESSION:    Decreased diffuse bilateral groundglass compatible with pneumonia/acute   lung injury.    KRYSTAL SIM M.D., ATTENDING RADIOLOGIST  This document has been electronically signed. May  4 2022  4:34PM  
Sydnee Atwood M.D.    Patient is a 76y old  Male who presents with a chief complaint of AHRF 2/2 Viral PNA / Influenza A r/o ARDS (17 May 2022 10:34)      SUBJECTIVE / OVERNIGHT EVENTS: feels well    Patient denies chest pain, SOB, abd pain, N/V, fever, chills, dysuria or any other complaints. All remainder ROS negative.     MEDICATIONS  (STANDING):  ALBUTerol    90 MICROgram(s) HFA Inhaler 2 Puff(s) Inhalation every 6 hours  aMIOdarone    Tablet 200 milliGRAM(s) Oral daily  amLODIPine   Tablet 5 milliGRAM(s) Oral daily  aspirin enteric coated 81 milliGRAM(s) Oral daily  atorvastatin 40 milliGRAM(s) Oral at bedtime  carvedilol 6.25 milliGRAM(s) Oral every 12 hours  clopidogrel Tablet 75 milliGRAM(s) Oral daily  enoxaparin Injectable 40 milliGRAM(s) SubCutaneous every 24 hours  fluconAZOLE   Tablet 150 milliGRAM(s) Oral daily  furosemide    Tablet 20 milliGRAM(s) Oral daily  methylPREDNISolone sodium succinate Injectable 40 milliGRAM(s) IV Push every 12 hours  nystatin    Suspension 143602 Unit(s) Swish and Swallow four times a day  pantoprazole    Tablet 40 milliGRAM(s) Oral before breakfast  saccharomyces boulardii 250 milliGRAM(s) Oral two times a day  sacubitril 24 mG/valsartan 26 mG 1 Tablet(s) Oral two times a day  senna 2 Tablet(s) Oral at bedtime    MEDICATIONS  (PRN):  acetaminophen     Tablet .. 650 milliGRAM(s) Oral every 6 hours PRN Temp greater or equal to 38C (100.4F), Mild Pain (1 - 3)  ALBUTerol    0.083% 2.5 milliGRAM(s) Nebulizer every 2 hours PRN Shortness of Breath and/or Wheezing  aluminum hydroxide/magnesium hydroxide/simethicone Suspension 30 milliLiter(s) Oral every 4 hours PRN Dyspepsia  guaifenesin/dextromethorphan Oral Liquid 10 milliLiter(s) Oral every 6 hours PRN Cough  melatonin 5 milliGRAM(s) Oral at bedtime PRN Insomnia  ondansetron Injectable 4 milliGRAM(s) IV Push every 8 hours PRN Nausea and/or Vomiting      I&O's Summary    17 May 2022 07:01  -  18 May 2022 07:00  --------------------------------------------------------  IN: 0 mL / OUT: 550 mL / NET: -550 mL    18 May 2022 07:01  -  18 May 2022 11:44  --------------------------------------------------------  IN: 320 mL / OUT: 1000 mL / NET: -680 mL        PHYSICAL EXAM:  Vital Signs Last 24 Hrs  T(C): 36.6 (18 May 2022 11:28), Max: 36.8 (18 May 2022 04:26)  T(F): 97.8 (18 May 2022 11:28), Max: 98.2 (18 May 2022 04:26)  HR: 62 (18 May 2022 11:28) (57 - 62)  BP: 98/64 (18 May 2022 11:28) (98/64 - 119/75)  BP(mean): --  RR: 19 (18 May 2022 11:28) (18 - 20)  SpO2: 91% (18 May 2022 11:28) (90% - 92%)    CONSTITUTIONAL: NAD, elderly on RA  ENMT: Moist oral mucosa, no pharyngeal injection or exudates; normal dentition  RESPIRATORY: Normal respiratory effort; lungs are clear to auscultation bilaterally, on NC  CARDIOVASCULAR: Regular rate and rhythm, normal S1 and S2, no murmur/rub/gallop; Peripheral pulses are 2+ bilaterally  ABDOMEN: Nontender to palpation, normoactive bowel sounds, no rebound/guarding;   MUSCLOSKELETAL:  No clubbing or cyanosis of digits; no joint swelling or tenderness to palpation  PSYCH: A+O to person, place, and time; affect appropriate  NEUROLOGY: CN 2-12 are intact and symmetric; no gross sensory deficits;   SKIN: No rashes; no palpable lesions    LABS:                        14.5   17.29 )-----------( 127      ( 18 May 2022 05:51 )             42.3     05-18    137  |  102  |  33.3<H>  ----------------------------<  141<H>  4.8   |  25.0  |  0.69    Ca    8.7      18 May 2022 05:51  Phos  2.9     05-18  Mg     2.3     05-18                CAPILLARY BLOOD GLUCOSE          RADIOLOGY & ADDITIONAL TESTS:  Results Reviewed:   Imaging Personally Reviewed:  Electrocardiogram Personally Reviewed:
[facility] Division of Hospital Medicine    Chief Complaint:  respiratory failure, Influenza a    SUBJECTIVE: reports feeling somewhat better, endorses generalized weakness and poor appetite    OVERNIGHT EVENTS: multiple desaturation episodes    Patient denies chest pain, SOB, abd pain, N/V, fever, chills, dysuria or any other complaints. All remainder ROS negative.     MEDICATIONS  (STANDING):  albuterol/ipratropium for Nebulization 3 milliLiter(s) Nebulizer every 6 hours  aMIOdarone    Tablet 200 milliGRAM(s) Oral daily  amLODIPine   Tablet 5 milliGRAM(s) Oral daily  aspirin enteric coated 81 milliGRAM(s) Oral daily  atorvastatin 40 milliGRAM(s) Oral at bedtime  azithromycin  IVPB 500 milliGRAM(s) IV Intermittent every 24 hours  carvedilol 6.25 milliGRAM(s) Oral every 12 hours  cefTRIAXone   IVPB 1000 milliGRAM(s) IV Intermittent every 24 hours  clopidogrel Tablet 75 milliGRAM(s) Oral daily  enoxaparin Injectable 40 milliGRAM(s) SubCutaneous every 24 hours  furosemide    Tablet 20 milliGRAM(s) Oral daily  guaiFENesin ER 1200 milliGRAM(s) Oral every 12 hours  oseltamivir 75 milliGRAM(s) Oral two times a day  predniSONE   Tablet 40 milliGRAM(s) Oral daily  sacubitril 49 mG/valsartan 51 mG 1 Tablet(s) Oral two times a day    MEDICATIONS  (PRN):  acetaminophen     Tablet .. 650 milliGRAM(s) Oral every 6 hours PRN Temp greater or equal to 38C (100.4F), Mild Pain (1 - 3)  ALBUTerol    0.083% 2.5 milliGRAM(s) Nebulizer every 2 hours PRN Shortness of Breath and/or Wheezing  aluminum hydroxide/magnesium hydroxide/simethicone Suspension 30 milliLiter(s) Oral every 4 hours PRN Dyspepsia  melatonin 3 milliGRAM(s) Oral at bedtime PRN Insomnia  ondansetron Injectable 4 milliGRAM(s) IV Push every 8 hours PRN Nausea and/or Vomiting        I&O's Summary      PHYSICAL EXAM:  Vital Signs Last 24 Hrs  T(C): 36.3 (29 Apr 2022 12:00), Max: 36.6 (28 Apr 2022 16:40)  T(F): 97.4 (29 Apr 2022 12:00), Max: 97.9 (28 Apr 2022 21:52)  HR: 70 (29 Apr 2022 14:49) (55 - 100)  BP: 88/51 (29 Apr 2022 12:00) (88/51 - 105/64)  BP(mean): --  RR: 18 (29 Apr 2022 12:00) (18 - 18)  SpO2: 94% (29 Apr 2022 11:06) (85% - 95%)        CONSTITUTIONAL: NAD, malnourished   ENMT: Moist oral mucosa, no pharyngeal injection or exudates; normal dentition; No JVD  RESPIRATORY: Normal respiratory effort; lungs are  coarse b/l, no wheezing or prolong expiratory phase;   CARDIOVASCULAR: Regular rate and rhythm, normal S1 and S2, systolic murmur at LUSB 2/6; No lower extremity edema; Peripheral pulses are 2+ bilaterally  ABDOMEN: Nontender to palpation, normoactive bowel sounds, no rebound/guarding; No hepatosplenomegaly  MUSCLOSKELETAL:  no clubbing or cyanosis of digits; no joint swelling or tenderness to palpation  PSYCH: A+O to person, place, and time; affect appropriate  NEUROLOGY: CN 2-12 are intact and symmetric; no gross sensory deficits; was observed moving all 4 ext against gravity cooperating with exam.   SKIN: atrophic skin, seboreic keratosis all over    LABS:                        14.4   22.87 )-----------( 221      ( 29 Apr 2022 06:26 )             41.8     04-29    141  |  105  |  23.7<H>  ----------------------------<  139<H>  3.3<L>   |  24.0  |  0.70    Ca    8.9      29 Apr 2022 06:26  Phos  2.9     04-28  Mg     2.4     04-28    TPro  5.8<L>  /  Alb  2.7<L>  /  TBili  0.8  /  DBili  x   /  AST  44<H>  /  ALT  48<H>  /  AlkPhos  80  04-28      CARDIAC MARKERS ( 28 Apr 2022 06:17 )  x     / <0.01 ng/mL / 162 U/L / x     / 1.8 ng/mL          Culture - Blood (collected 27 Apr 2022 13:18)  Source: .Blood Blood  Preliminary Report (29 Apr 2022 15:01):    No growth at 48 hours    Culture - Blood (collected 27 Apr 2022 13:17)  Source: .Blood Blood  Preliminary Report (29 Apr 2022 15:01):    No growth at 48 hours      CAPILLARY BLOOD GLUCOSE            RADIOLOGY & ADDITIONAL TESTS:  Results Reviewed:   Imaging Personally Reviewed:  Electrocardiogram Personally Reviewed:
CC: AHRF 2/2 Viral PNA / Influenza A r/o ARDS (05 May 2022 10:17)    INTERVAL HPI/OVERNIGHT EVENTS:  no acute events overnight  without acute events    Vital Signs Last 24 Hrs  T(C): 36.5 (05 May 2022 10:07), Max: 36.6 (05 May 2022 04:42)  T(F): 97.7 (05 May 2022 10:07), Max: 97.9 (05 May 2022 04:42)  HR: 61 (05 May 2022 10:07) (51 - 73)  BP: 106/66 (05 May 2022 10:07) (106/66 - 109/68)  BP(mean): --  RR: 18 (05 May 2022 10:07) (18 - 20)  SpO2: 92% (05 May 2022 10:07) (91% - 95%)    PHYSICAL EXAM:  General: in no acute distress  Eyes: PERRLA, EOMI; conjunctiva and sclera clear  Head: Normocephalic; atraumatic  ENMT: No nasal discharge; airway clear  Neck: Supple; non tender; no masses  Respiratory: No wheezes, rales or rhonchi  Cardiovascular: Regular rate and rhythm. S1 and S2 Normal; No murmurs, gallops or rubs  Gastrointestinal: Soft non-tender non-distended; Normal bowel sounds  Genitourinary: No costovertebral angle tenderness  Extremities: Normal range of motion, No clubbing, cyanosis or edema  Vascular: Peripheral pulses palpable 2+ bilaterally  Neurological: Alert and oriented x4  Skin: Warm and dry. No acute rash  Psychiatric: Cooperative and appropriate    I&O's Detail    05 May 2022 07:01  -  05 May 2022 12:39  --------------------------------------------------------  IN:  Total IN: 0 mL    OUT:    Voided (mL): 300 mL  Total OUT: 300 mL    Total NET: -300 mL                        14.9   21.30 )-----------( 388      ( 05 May 2022 07:27 )             44.9     05 May 2022 07:27    139    |  101    |  31.3   ----------------------------<  161    4.6     |  27.0   |  0.72     Ca    9.2        05 May 2022 07:27  Phos  2.7       05 May 2022 07:27  Mg     2.5       05 May 2022 07:27    CAPILLARY BLOOD GLUCOSE    MEDICATIONS  (STANDING):  albuterol/ipratropium for Nebulization 3 milliLiter(s) Nebulizer every 6 hours  aMIOdarone    Tablet 200 milliGRAM(s) Oral daily  amLODIPine   Tablet 5 milliGRAM(s) Oral daily  aspirin enteric coated 81 milliGRAM(s) Oral daily  atorvastatin 40 milliGRAM(s) Oral at bedtime  benzonatate 100 milliGRAM(s) Oral three times a day  carvedilol 6.25 milliGRAM(s) Oral every 12 hours  clopidogrel Tablet 75 milliGRAM(s) Oral daily  enoxaparin Injectable 40 milliGRAM(s) SubCutaneous every 24 hours  methylPREDNISolone sodium succinate Injectable 40 milliGRAM(s) IV Push every 8 hours  piperacillin/tazobactam IVPB.. 3.375 Gram(s) IV Intermittent every 8 hours  sacubitril 24 mG/valsartan 26 mG 1 Tablet(s) Oral two times a day    MEDICATIONS  (PRN):  acetaminophen     Tablet .. 650 milliGRAM(s) Oral every 6 hours PRN Temp greater or equal to 38C (100.4F), Mild Pain (1 - 3)  ALBUTerol    0.083% 2.5 milliGRAM(s) Nebulizer every 2 hours PRN Shortness of Breath and/or Wheezing  aluminum hydroxide/magnesium hydroxide/simethicone Suspension 30 milliLiter(s) Oral every 4 hours PRN Dyspepsia  guaifenesin/dextromethorphan Oral Liquid 10 milliLiter(s) Oral every 6 hours PRN Cough  melatonin 5 milliGRAM(s) Oral at bedtime PRN Insomnia  ondansetron Injectable 4 milliGRAM(s) IV Push every 8 hours PRN Nausea and/or Vomiting      RADIOLOGY & ADDITIONAL TESTS:
CC: AHRF 2/2 Viral PNA / Influenza A r/o ARDS (05 May 2022 10:17)    INTERVAL HPI/OVERNIGHT EVENTS:  no acute events overnight - still feels SOB, about the same   on 6lit NC     Vital Signs Last 24 Hrs  T(C): 36.5 (07 May 2022 05:04), Max: 36.5 (07 May 2022 05:04)  T(F): 97.7 (07 May 2022 05:04), Max: 97.7 (07 May 2022 05:04)  HR: 60 (07 May 2022 09:12) (59 - 76)  BP: 116/70 (07 May 2022 05:04) (116/70 - 119/73)  BP(mean): --  RR: 20 (07 May 2022 05:04) (18 - 20)  SpO2: 92% (07 May 2022 09:12) (90% - 93%)      PHYSICAL EXAM:  General: in no acute distress, poorly nourished   Eyes: PERRLA, EOMI; conjunctiva and sclera clear  Head: Normocephalic; atraumatic  ENMT: No nasal discharge; airway clear  Neck: Supple; non tender; no masses  Respiratory: bilateral rhonchi  Cardiovascular: Regular rate and rhythm. S1 and S2 Normal; No murmur  Gastrointestinal: Soft non-tender non-distended; Normal bowel sounds  Extremities: Normal range of motion, No clubbing, cyanosis or edema  Neurological: Alert and oriented x4  Skin: Warm and dry. No acute rash  Psychiatric: Cooperative and appropriate      LABS:      MEDICATIONS  (STANDING):  albuterol/ipratropium for Nebulization 3 milliLiter(s) Nebulizer every 6 hours  aMIOdarone    Tablet 200 milliGRAM(s) Oral daily  amLODIPine   Tablet 5 milliGRAM(s) Oral daily  aspirin enteric coated 81 milliGRAM(s) Oral daily  atorvastatin 40 milliGRAM(s) Oral at bedtime  benzonatate 100 milliGRAM(s) Oral three times a day  carvedilol 6.25 milliGRAM(s) Oral every 12 hours  clopidogrel Tablet 75 milliGRAM(s) Oral daily  enoxaparin Injectable 40 milliGRAM(s) SubCutaneous every 24 hours  furosemide    Tablet 20 milliGRAM(s) Oral daily  methylPREDNISolone sodium succinate Injectable 40 milliGRAM(s) IV Push every 12 hours  piperacillin/tazobactam IVPB.. 3.375 Gram(s) IV Intermittent every 8 hours  sacubitril 24 mG/valsartan 26 mG 1 Tablet(s) Oral two times a day    MEDICATIONS  (PRN):  acetaminophen     Tablet .. 650 milliGRAM(s) Oral every 6 hours PRN Temp greater or equal to 38C (100.4F), Mild Pain (1 - 3)  ALBUTerol    0.083% 2.5 milliGRAM(s) Nebulizer every 2 hours PRN Shortness of Breath and/or Wheezing  aluminum hydroxide/magnesium hydroxide/simethicone Suspension 30 milliLiter(s) Oral every 4 hours PRN Dyspepsia  guaifenesin/dextromethorphan Oral Liquid 10 milliLiter(s) Oral every 6 hours PRN Cough  melatonin 5 milliGRAM(s) Oral at bedtime PRN Insomnia  ondansetron Injectable 4 milliGRAM(s) IV Push every 8 hours PRN Nausea and/or Vomiting                         
[facility] Division of Hospital Medicine    Chief Complaint: AHRF 2/2 Viral PNA / Influenza A      SUBJECTIVE / OVERNIGHT EVENTS: No acute events overnight. Patient continues to endorse odynophagia with no improvement. Denies n/v/f/c/h/d.    MEDICATIONS  (STANDING):  ALBUTerol    90 MICROgram(s) HFA Inhaler 2 Puff(s) Inhalation every 6 hours  aMIOdarone    Tablet 200 milliGRAM(s) Oral daily  amLODIPine   Tablet 5 milliGRAM(s) Oral daily  aspirin enteric coated 81 milliGRAM(s) Oral daily  atorvastatin 40 milliGRAM(s) Oral at bedtime  carvedilol 6.25 milliGRAM(s) Oral every 12 hours  clopidogrel Tablet 75 milliGRAM(s) Oral daily  enoxaparin Injectable 40 milliGRAM(s) SubCutaneous every 24 hours  fluconAZOLE   Tablet 150 milliGRAM(s) Oral daily  furosemide    Tablet 20 milliGRAM(s) Oral daily  methylPREDNISolone sodium succinate Injectable 40 milliGRAM(s) IV Push every 8 hours  nystatin    Suspension 350036 Unit(s) Swish and Swallow four times a day  pantoprazole    Tablet 40 milliGRAM(s) Oral before breakfast  piperacillin/tazobactam IVPB.. 3.375 Gram(s) IV Intermittent every 8 hours  saccharomyces boulardii 250 milliGRAM(s) Oral two times a day  sacubitril 24 mG/valsartan 26 mG 1 Tablet(s) Oral two times a day  senna 2 Tablet(s) Oral at bedtime    MEDICATIONS  (PRN):  acetaminophen     Tablet .. 650 milliGRAM(s) Oral every 6 hours PRN Temp greater or equal to 38C (100.4F), Mild Pain (1 - 3)  ALBUTerol    0.083% 2.5 milliGRAM(s) Nebulizer every 2 hours PRN Shortness of Breath and/or Wheezing  aluminum hydroxide/magnesium hydroxide/simethicone Suspension 30 milliLiter(s) Oral every 4 hours PRN Dyspepsia  guaifenesin/dextromethorphan Oral Liquid 10 milliLiter(s) Oral every 6 hours PRN Cough  melatonin 5 milliGRAM(s) Oral at bedtime PRN Insomnia  ondansetron Injectable 4 milliGRAM(s) IV Push every 8 hours PRN Nausea and/or Vomiting        I&O's Summary      PHYSICAL EXAM:  Vital Signs Last 24 Hrs  T(C): 36.6 (14 May 2022 04:28), Max: 36.6 (14 May 2022 04:28)  T(F): 97.8 (14 May 2022 04:28), Max: 97.8 (14 May 2022 04:28)  HR: 79 (14 May 2022 04:28) (60 - 79)  BP: 105/75 (14 May 2022 04:28) (92/57 - 117/73)  BP(mean): --  RR: 18 (14 May 2022 04:28) (18 - 20)  SpO2: 96% (14 May 2022 04:28) (90% - 96%)      General: on  Nasal cannula Oxygen, poorly nourished   Eyes: PERRLA, EOMI; conjunctiva and sclera clear. oral thrush+  Head: Normocephalic; atraumatic  Neck: Supple  Respiratory: CTAB, Rt lower lobe decreased breath sounds   Cardiovascular: Regular rate and rhythm. S1 and S2 Normal; No murmur  Extremities: Normal range of motion, No clubbing, cyanosis or edema  Neurological: Alert and oriented x4  Skin: Warm and dry. No acute rash  Psychiatric: Cooperative and appropriate      LABS:                    Culture - Sputum (collected 11 May 2022 21:57)  Source: .Sputum Sputum  Gram Stain (11 May 2022 23:33):    Rare polymorphonuclear leukocytes per low power field    Moderate Squamous epithelial cells per low power field    Numerous Yeast like cells per oil power field    Rare Gram Negative Rods per oil power field    Rare Gram Positive Cocci in Pairs and Chains per oil power field    Rare Gram Positive Rods per oil power field  Final Report (13 May 2022 18:25):    Moderate Klebsiella oxytoca/Raoutella ornithinolytica    Normal Respiratory Marylin present  Organism: Klebsiella oxytoca /Raoutella ornithinolytica (13 May 2022 18:25)  Organism: Klebsiella oxytoca /Raoutella ornithinolytica (13 May 2022 18:25)      CAPILLARY BLOOD GLUCOSE            RADIOLOGY & ADDITIONAL TESTS:  Results Reviewed:   Imaging Personally Reviewed:  Electrocardiogram Personally Reviewed:                                          
PULMONARY PROGRESS NOTE      WARREN GALIGERRY-[id number]    Patient is a 76y old  Male who presents with a chief complaint of AHRF 2/2 Viral PNA / Influenza A r/o ARDS (29 Apr 2022 16:01)      INTERVAL HPI/OVERNIGHT EVENTS:Awake, NAD on HFNC.  States breathing slt better.  Sat 95 on HFNC.    MEDICATIONS  (STANDING):  albuterol/ipratropium for Nebulization 3 milliLiter(s) Nebulizer every 6 hours  aMIOdarone    Tablet 200 milliGRAM(s) Oral daily  amLODIPine   Tablet 5 milliGRAM(s) Oral daily  aspirin enteric coated 81 milliGRAM(s) Oral daily  atorvastatin 40 milliGRAM(s) Oral at bedtime  azithromycin  IVPB 500 milliGRAM(s) IV Intermittent every 24 hours  benzonatate 100 milliGRAM(s) Oral three times a day  carvedilol 6.25 milliGRAM(s) Oral every 12 hours  cefTRIAXone   IVPB 1000 milliGRAM(s) IV Intermittent every 24 hours  clopidogrel Tablet 75 milliGRAM(s) Oral daily  enoxaparin Injectable 40 milliGRAM(s) SubCutaneous every 24 hours  guaiFENesin ER 1200 milliGRAM(s) Oral every 12 hours  oseltamivir 75 milliGRAM(s) Oral two times a day  predniSONE   Tablet 40 milliGRAM(s) Oral daily  sacubitril 24 mG/valsartan 26 mG 1 Tablet(s) Oral two times a day      MEDICATIONS  (PRN):  acetaminophen     Tablet .. 650 milliGRAM(s) Oral every 6 hours PRN Temp greater or equal to 38C (100.4F), Mild Pain (1 - 3)  ALBUTerol    0.083% 2.5 milliGRAM(s) Nebulizer every 2 hours PRN Shortness of Breath and/or Wheezing  aluminum hydroxide/magnesium hydroxide/simethicone Suspension 30 milliLiter(s) Oral every 4 hours PRN Dyspepsia  melatonin 3 milliGRAM(s) Oral at bedtime PRN Insomnia  ondansetron Injectable 4 milliGRAM(s) IV Push every 8 hours PRN Nausea and/or Vomiting      Allergies    No Known Allergies    Intolerances        PAST MEDICAL & SURGICAL HISTORY:  HTN (hypertension)    High cholesterol    Ejection fraction &lt; 50%    AICD (automatic cardioverter/defibrillator) present    History of total hip replacement, right    S/P cholecystectomy    Abdominal hernia        SOCIAL HISTORY  Smoking History:       REVIEW OF SYSTEMS:    CONSTITUTIONAL:  per HPI    HEENT:  Eyes:  No diplopia or blurred vision. ENT:  No earache, sore throat or runny nose.    CARDIOVASCULAR:  No pressure, squeezing, tightness, heaviness or aching about the chest; no palpitations.    RESPIRATORY:  per hpi    GASTROINTESTINAL:  No nausea, vomiting or diarrhea.    GENITOURINARY:  No dysuria, frequency or urgency.    MUSCULOSKELETAL:  No joint pain    SKIN:  No new lesions.    NEUROLOGIC:  No paresthesias, fasciculations, seizures or weakness.    PSYCHIATRIC:  No disorder of thought or mood.    ENDOCRINE:  No heat or cold intolerance, polyuria or polydipsia.    HEMATOLOGICAL:  No easy bruising or bleeding.     Vital Signs Last 24 Hrs  T(C): 36.6 (30 Apr 2022 05:08), Max: 36.9 (29 Apr 2022 21:40)  T(F): 97.9 (30 Apr 2022 05:08), Max: 98.4 (29 Apr 2022 21:40)  HR: 82 (30 Apr 2022 10:07) (56 - 100)  BP: 105/64 (30 Apr 2022 05:08) (88/51 - 108/62)  BP(mean): --  RR: 18 (30 Apr 2022 05:08) (18 - 19)  SpO2: 94% (30 Apr 2022 10:07) (94% - 97%)    PHYSICAL EXAMINATION:    GENERAL: The patient is awake and alert in no apparent distress.     HEENT: Head is normocephalic and atraumatic. Extraocular muscles are intact. Mucous membranes are moist.    NECK: Supple.    LUNGS: coarse bs    HEART: Regular rate and rhythm without murmur.    ABDOMEN: Soft, nontender, and nondistended.      EXTREMITIES: Without any cyanosis, clubbing, rash, lesions or edema.    NEUROLOGIC: Grossly intact.    SKIN: No ulceration or induration present.      LABS:                        14.4   22.87 )-----------( 221      ( 29 Apr 2022 06:26 )             41.8     04-29    141  |  105  |  23.7<H>  ----------------------------<  139<H>  3.3<L>   |  24.0  |  0.70    Ca    8.9      29 Apr 2022 06:26                  Serum Pro-Brain Natriuretic Peptide: 1263 pg/mL (04-27-22 @ 13:09)      Procalcitonin, Serum: 0.20 ng/mL (04-28-22 @ 06:17)      MICROBIOLOGY:    RADIOLOGY & ADDITIONAL STUDIES:
CC: AHRF 2/2 Viral PNA / Influenza A     INTERVAL HPI/OVERNIGHT EVENTS:  acute decompensationon 5/10, required Hi flow Oxygen - still on hi flow - 65%  feels somewhat better      Vital Signs Last 24 Hrs  T(C): 36.1 (11 May 2022 10:09), Max: 36.7 (10 May 2022 16:51)  T(F): 97 (11 May 2022 10:09), Max: 98.1 (10 May 2022 16:51)  HR: 63 (11 May 2022 15:44) (52 - 98)  BP: 125/67 (11 May 2022 10:09) (103/68 - 125/67)  BP(mean): --  RR: 20 (11 May 2022 04:20) (20 - 20)  SpO2: 93% (11 May 2022 15:46) (92% - 98%)    PHYSICAL EXAM:  General: on HI flow Nasal cannula Oxygen, poorly nourished   Eyes: PERRLA, EOMI; conjunctiva and sclera clear  Head: Normocephalic; atraumatic  Neck: Supple  Respiratory: CTAB, Rt lower lobe decreased breath sounds   Cardiovascular: Regular rate and rhythm. S1 and S2 Normal; No murmur  Extremities: Normal range of motion, No clubbing, cyanosis or edema  Neurological: Alert and oriented x4  Skin: Warm and dry. No acute rash  Psychiatric: Cooperative and appropriate      LABS:                                       15.9   23.28 )-----------( 299      ( 10 May 2022 10:05 )             47.1   05-10    133<L>  |  101  |  35.4<H>  ----------------------------<  184<H>  5.3   |  21.0<L>  |  0.83    Ca    9.1      10 May 2022 10:05              MEDICATIONS  (STANDING):  acetylcysteine 20%  Inhalation 4 milliLiter(s) Inhalation every 6 hours  ALBUTerol    0.083% 2.5 milliGRAM(s) Nebulizer every 6 hours  aMIOdarone    Tablet 200 milliGRAM(s) Oral daily  amLODIPine   Tablet 5 milliGRAM(s) Oral daily  aspirin enteric coated 81 milliGRAM(s) Oral daily  atorvastatin 40 milliGRAM(s) Oral at bedtime  carvedilol 6.25 milliGRAM(s) Oral every 12 hours  clopidogrel Tablet 75 milliGRAM(s) Oral daily  enoxaparin Injectable 40 milliGRAM(s) SubCutaneous every 24 hours  furosemide    Tablet 20 milliGRAM(s) Oral daily  methylPREDNISolone sodium succinate Injectable 40 milliGRAM(s) IV Push every 8 hours  piperacillin/tazobactam IVPB.. 3.375 Gram(s) IV Intermittent every 8 hours  saccharomyces boulardii 250 milliGRAM(s) Oral two times a day  sacubitril 24 mG/valsartan 26 mG 1 Tablet(s) Oral two times a day  senna 2 Tablet(s) Oral at bedtime    MEDICATIONS  (PRN):  acetaminophen     Tablet .. 650 milliGRAM(s) Oral every 6 hours PRN Temp greater or equal to 38C (100.4F), Mild Pain (1 - 3)  ALBUTerol    0.083% 2.5 milliGRAM(s) Nebulizer every 2 hours PRN Shortness of Breath and/or Wheezing  aluminum hydroxide/magnesium hydroxide/simethicone Suspension 30 milliLiter(s) Oral every 4 hours PRN Dyspepsia  guaifenesin/dextromethorphan Oral Liquid 10 milliLiter(s) Oral every 6 hours PRN Cough  melatonin 5 milliGRAM(s) Oral at bedtime PRN Insomnia  ondansetron Injectable 4 milliGRAM(s) IV Push every 8 hours PRN Nausea and/or Vomiting  
PULMONARY PROGRESS NOTE      GALI KELLEYGERRY-[id number]    Patient is a 76y old  Male who presents with a chief complaint of AHRF 2/2 Viral PNA / Influenza A r/o ARDS (04 May 2022 10:11)      INTERVAL HPI/OVERNIGHT EVENTS:No sig change.  REmains on HFNC.  No resp distress.    MEDICATIONS  (STANDING):  albuterol/ipratropium for Nebulization 3 milliLiter(s) Nebulizer every 6 hours  aMIOdarone    Tablet 200 milliGRAM(s) Oral daily  amLODIPine   Tablet 5 milliGRAM(s) Oral daily  aspirin enteric coated 81 milliGRAM(s) Oral daily  atorvastatin 40 milliGRAM(s) Oral at bedtime  benzonatate 100 milliGRAM(s) Oral three times a day  carvedilol 6.25 milliGRAM(s) Oral every 12 hours  clopidogrel Tablet 75 milliGRAM(s) Oral daily  enoxaparin Injectable 40 milliGRAM(s) SubCutaneous every 24 hours  methylPREDNISolone sodium succinate Injectable 40 milliGRAM(s) IV Push every 8 hours  piperacillin/tazobactam IVPB.. 3.375 Gram(s) IV Intermittent every 8 hours  sacubitril 24 mG/valsartan 26 mG 1 Tablet(s) Oral two times a day      MEDICATIONS  (PRN):  acetaminophen     Tablet .. 650 milliGRAM(s) Oral every 6 hours PRN Temp greater or equal to 38C (100.4F), Mild Pain (1 - 3)  ALBUTerol    0.083% 2.5 milliGRAM(s) Nebulizer every 2 hours PRN Shortness of Breath and/or Wheezing  aluminum hydroxide/magnesium hydroxide/simethicone Suspension 30 milliLiter(s) Oral every 4 hours PRN Dyspepsia  guaifenesin/dextromethorphan Oral Liquid 10 milliLiter(s) Oral every 6 hours PRN Cough  melatonin 5 milliGRAM(s) Oral at bedtime PRN Insomnia  ondansetron Injectable 4 milliGRAM(s) IV Push every 8 hours PRN Nausea and/or Vomiting      Allergies    No Known Allergies    Intolerances        PAST MEDICAL & SURGICAL HISTORY:  HTN (hypertension)    High cholesterol    Ejection fraction &lt; 50%    AICD (automatic cardioverter/defibrillator) present    History of total hip replacement, right    S/P cholecystectomy    Abdominal hernia        SOCIAL HISTORY  Smoking History:       REVIEW OF SYSTEMS:    CONSTITUTIONAL:  per HPI    HEENT:  Eyes:  No diplopia or blurred vision. ENT:  No earache, sore throat or runny nose.    CARDIOVASCULAR:  No pressure, squeezing, tightness, heaviness or aching about the chest; no palpitations.    RESPIRATORY:  per hpi    GASTROINTESTINAL:  No nausea, vomiting or diarrhea.    GENITOURINARY:  No dysuria, frequency or urgency.    MUSCULOSKELETAL:  No joint pain    SKIN:  No new lesions.    NEUROLOGIC:  No paresthesias, fasciculations, seizures or weakness.    PSYCHIATRIC:  No disorder of thought or mood.    ENDOCRINE:  No heat or cold intolerance, polyuria or polydipsia.    HEMATOLOGICAL:  No easy bruising or bleeding.     Vital Signs Last 24 Hrs  T(C): 36.5 (04 May 2022 10:38), Max: 36.6 (03 May 2022 16:35)  T(F): 97.7 (04 May 2022 10:38), Max: 97.9 (03 May 2022 16:35)  HR: 83 (04 May 2022 10:38) (58 - 83)  BP: 128/73 (04 May 2022 10:38) (98/63 - 128/73)  BP(mean): --  RR: 18 (04 May 2022 10:38) (18 - 18)  SpO2: 95% (04 May 2022 10:38) (91% - 96%)    PHYSICAL EXAMINATION:    GENERAL: The patient is awake and alert in no apparent distress.     HEENT: Head is normocephalic and atraumatic. Extraocular muscles are intact. Mucous membranes are moist.    NECK: Supple.    LUNGS: coarse bs bilat.    HEART: Regular rate and rhythm without murmur.    ABDOMEN: Soft, nontender, and nondistended.      EXTREMITIES: Without any cyanosis, clubbing, rash, lesions or edema.    NEUROLOGIC: Grossly intact.    SKIN: No ulceration or induration present.      LABS:                        13.9   20.83 )-----------( 350      ( 04 May 2022 06:04 )             42.3     05-04    141  |  103  |  29.9<H>  ----------------------------<  172<H>  4.5   |  26.0  |  0.66    Ca    9.0      04 May 2022 06:04  Phos  2.8     05-04  Mg     2.3     05-04      Blood Gas Profile - Arterial (05.01.22 @ 09:25)    pH, Arterial: 7.480: As of 6/9/2020 Reference Ranges have been amended for: ONEIL, COHB, GLUWB,  HCO3, KWB, METHHB, NAWB, O2HB, PCO2.    pCO2, Arterial: 38 mmHg    pO2, Arterial: 81 mmHg    HCO3, Arterial: 28 mmol/L    Base Excess, Arterial: 4.8 mmol/L    Oxygen Saturation, Arterial: 97.5 %    FIO2, Arterial: 1.0    Blood Gas Comments Arterial: Resident on HFNC 40L/100%/31 with 100%NRB    Blood Gas Source Arterial: Arterial                        MICROBIOLOGY:    RADIOLOGY & ADDITIONAL STUDIES:< from: Xray Chest 1 View- PORTABLE-Urgent (Xray Chest 1 View- PORTABLE-Urgent .) (05.01.22 @ 10:32) >  IMPRESSION: Unchanged fairly advanced infiltrates.    < end of copied text >  
Sydnee Atwood M.D.    Patient is a 76y old  Male who presents with a chief complaint of AHRF 2/2 Viral PNA / Influenza A r/o ARDS (01 May 2022 11:00)      SUBJECTIVE / OVERNIGHT EVENTS: still feels crappy, about the same as yesterday.     Patient denies chest pain, SOB, abd pain, N/V, fever, chills, dysuria or any other complaints. All remainder ROS negative.     MEDICATIONS  (STANDING):  albuterol/ipratropium for Nebulization 3 milliLiter(s) Nebulizer every 6 hours  aMIOdarone    Tablet 200 milliGRAM(s) Oral daily  amLODIPine   Tablet 5 milliGRAM(s) Oral daily  aspirin enteric coated 81 milliGRAM(s) Oral daily  atorvastatin 40 milliGRAM(s) Oral at bedtime  azithromycin  IVPB 500 milliGRAM(s) IV Intermittent every 24 hours  benzonatate 100 milliGRAM(s) Oral three times a day  carvedilol 6.25 milliGRAM(s) Oral every 12 hours  clopidogrel Tablet 75 milliGRAM(s) Oral daily  enoxaparin Injectable 40 milliGRAM(s) SubCutaneous every 24 hours  methylPREDNISolone sodium succinate Injectable 40 milliGRAM(s) IV Push every 8 hours  oseltamivir 75 milliGRAM(s) Oral two times a day  piperacillin/tazobactam IVPB.. 3.375 Gram(s) IV Intermittent every 8 hours  sacubitril 24 mG/valsartan 26 mG 1 Tablet(s) Oral two times a day    MEDICATIONS  (PRN):  acetaminophen     Tablet .. 650 milliGRAM(s) Oral every 6 hours PRN Temp greater or equal to 38C (100.4F), Mild Pain (1 - 3)  ALBUTerol    0.083% 2.5 milliGRAM(s) Nebulizer every 2 hours PRN Shortness of Breath and/or Wheezing  aluminum hydroxide/magnesium hydroxide/simethicone Suspension 30 milliLiter(s) Oral every 4 hours PRN Dyspepsia  guaifenesin/dextromethorphan Oral Liquid 10 milliLiter(s) Oral every 6 hours PRN Cough  melatonin 3 milliGRAM(s) Oral at bedtime PRN Insomnia  ondansetron Injectable 4 milliGRAM(s) IV Push every 8 hours PRN Nausea and/or Vomiting      I&O's Summary      PHYSICAL EXAM:  Vital Signs Last 24 Hrs  T(C): 36.4 (02 May 2022 05:20), Max: 36.7 (01 May 2022 11:08)  T(F): 97.5 (02 May 2022 05:20), Max: 98.1 (01 May 2022 11:08)  HR: 62 (02 May 2022 05:20) (60 - 68)  BP: 104/65 (02 May 2022 05:20) (104/65 - 123/72)  BP(mean): --  RR: 19 (02 May 2022 05:20) (18 - 20)  SpO2: 96% (02 May 2022 05:20) (92% - 97%)    CONSTITUTIONAL: NAD, well-groomed. On HFNC  ENMT: Moist oral mucosa, no pharyngeal injection or exudate  RESPIRATORY: Diffuse bronchial breath sound with occasional ronchil noted   CARDIOVASCULAR: Regular rate and rhythm, normal S1 and S2, no murmur/rub/gallop; No lower extremity edema  ABDOMEN: Nontender to palpation, normoactive bowel sounds, no rebound/guarding; No hepatosplenomegaly  MUSCLOSKELETAL:  Normal gait; no clubbing or cyanosis of digits; no joint swelling or tenderness to palpation  PSYCH: A+O x3; affect appropriate  NEUROLOGY: CN 2-12 are intact and symmetric; no gross sensory deficits;   SKIN: No rashes; no palpable lesions  LABS:                        14.1   16.23 )-----------( 306      ( 02 May 2022 06:14 )             42.3     05-02    140  |  104  |  21.6<H>  ----------------------------<  201<H>  3.8   |  24.0  |  0.53    Ca    8.8      02 May 2022 06:14  Phos  2.8     05-02  Mg     2.4     05-02                CAPILLARY BLOOD GLUCOSE          RADIOLOGY & ADDITIONAL TESTS:  Results Reviewed:   Imaging Personally Reviewed:  Electrocardiogram Personally Reviewed:
PULMONARY PROGRESS NOTE      GALI KELLEYGERRY-[id number]    Patient is a 76y old  Male who presents with a chief complaint of AHRF 2/2 Viral PNA / Influenza A r/o ARDS (02 May 2022 09:44)  76M with PMHX MI/CAD, HTN, HLD, CHFrEF, ICM s/p AICD presents to [facility] ER c/o SOB/CARL and Nonproductive Cough x1-2 days s/p failed outpt abx for PNA admitted for Acute Hypoxic Respiratory Failure 2/2 Viral PNA/Influenza and possible superimposed bacterial PNA      INTERVAL HPI/OVERNIGHT EVENTS:  Feels ill  Little interval change     MEDICATIONS  (STANDING):  albuterol/ipratropium for Nebulization 3 milliLiter(s) Nebulizer every 6 hours  aMIOdarone    Tablet 200 milliGRAM(s) Oral daily  amLODIPine   Tablet 5 milliGRAM(s) Oral daily  aspirin enteric coated 81 milliGRAM(s) Oral daily  atorvastatin 40 milliGRAM(s) Oral at bedtime  azithromycin  IVPB 500 milliGRAM(s) IV Intermittent every 24 hours  benzonatate 100 milliGRAM(s) Oral three times a day  carvedilol 6.25 milliGRAM(s) Oral every 12 hours  clopidogrel Tablet 75 milliGRAM(s) Oral daily  enoxaparin Injectable 40 milliGRAM(s) SubCutaneous every 24 hours  methylPREDNISolone sodium succinate Injectable 40 milliGRAM(s) IV Push every 8 hours  oseltamivir 75 milliGRAM(s) Oral two times a day  piperacillin/tazobactam IVPB.. 3.375 Gram(s) IV Intermittent every 8 hours  sacubitril 24 mG/valsartan 26 mG 1 Tablet(s) Oral two times a day      MEDICATIONS  (PRN):  acetaminophen     Tablet .. 650 milliGRAM(s) Oral every 6 hours PRN Temp greater or equal to 38C (100.4F), Mild Pain (1 - 3)  ALBUTerol    0.083% 2.5 milliGRAM(s) Nebulizer every 2 hours PRN Shortness of Breath and/or Wheezing  aluminum hydroxide/magnesium hydroxide/simethicone Suspension 30 milliLiter(s) Oral every 4 hours PRN Dyspepsia  guaifenesin/dextromethorphan Oral Liquid 10 milliLiter(s) Oral every 6 hours PRN Cough  melatonin 3 milliGRAM(s) Oral at bedtime PRN Insomnia  ondansetron Injectable 4 milliGRAM(s) IV Push every 8 hours PRN Nausea and/or Vomiting      Allergies    No Known Allergies    Intolerances        PAST MEDICAL & SURGICAL HISTORY:  HTN (hypertension)    High cholesterol    Ejection fraction &lt; 50%    AICD (automatic cardioverter/defibrillator) present    History of total hip replacement, right    S/P cholecystectomy    Abdominal hernia        SOCIAL HISTORY  Smoking History:       REVIEW OF SYSTEMS:    CONSTITUTIONAL:  No distress    HEENT:  Eyes:  No diplopia or blurred vision. ENT:  No earache, sore throat or runny nose.    CARDIOVASCULAR:  No pressure, squeezing, tightness, heaviness or aching about the chest; no palpitations.    RESPIRATORY:  No cough, shortness of breath, PND or orthopnea. Mild SOBOE    GASTROINTESTINAL:  No nausea, vomiting or diarrhea.    GENITOURINARY:  No dysuria, frequency or urgency.    NEUROLOGIC:  No paresthesias, fasciculations, seizures or weakness.    PSYCHIATRIC:  No disorder of thought or mood.    Vital Signs Last 24 Hrs  T(C): 36.4 (02 May 2022 05:20), Max: 36.7 (01 May 2022 11:08)  T(F): 97.5 (02 May 2022 05:20), Max: 98.1 (01 May 2022 11:08)  HR: 62 (02 May 2022 05:20) (60 - 68)  BP: 104/65 (02 May 2022 05:20) (104/65 - 123/72)  BP(mean): --  RR: 19 (02 May 2022 05:20) (18 - 20)  SpO2: 96% (02 May 2022 05:20) (93% - 97%)    PHYSICAL EXAMINATION:    GENERAL: The patient is awake and alert      HEENT: Head is normocephalic and atraumatic. Extraocular muscles are intact. Mucous membranes are moist.    NECK: Supple.    LUNGS: Scattered rhonchi to auscultation    HEART: Regular rate and rhythm without murmur.        LABS:                        14.1   16.23 )-----------( 306      ( 02 May 2022 06:14 )             42.3     05-02    140  |  104  |  21.6<H>  ----------------------------<  201<H>  3.8   |  24.0  |  0.53    Ca    8.8      02 May 2022 06:14  Phos  2.8     05-02  Mg     2.4     05-02          ABG - ( 01 May 2022 09:25 )  pH, Arterial: 7.480 pH, Blood: x     /  pCO2: 38    /  pO2: 81    / HCO3: 28    / Base Excess: 4.8   /  SaO2: 97.5        MICROBIOLOGY: Non-contrib    RADIOLOGY & ADDITIONAL STUDIES:    CTA on 4/27/22  IMPRESSION:    No pulmonaryembolus.    Patchy bilateral groundglass opacities which have the appearance of COVID   19 pneumonia. Correlate with PCR examination.    Remaining incidental findings as above.    IVETTE GARCIA MD; Attending Radiologist  This document has been electronically signed. Apr 27 2022  4:40PM          ACC: [id number] EXAM:  XR CHEST PORTABLE URGENT 1V                          PROCEDURE DATE:  04/29/2022      INTERPRETATION:  AP semierect chest on April 29, 2022 at 9:23 AM.    Elevated right hemidiaphragm and probable heart enlargement again noted.   Left sided defibrillator again noted.    There are persistent central infiltrates.    Chest is similar to April 27.    IMPRESSION: Unchanged bilateral infiltrates.    NIDIA MCCRAY MD; Attending Radiologist  This document has been electronically signed. Apr 29 2022  2:11PM  
Sydnee Atwood M.D.    Patient is a 76y old  Male who presents with a chief complaint of AHRF 2/2 Viral PNA / Influenza A r/o ARDS (01 May 2022 10:47)      SUBJECTIVE / OVERNIGHT EVENTS: feels worse, exhausted Desat to 70s on HFNC, now on NRB and HFNC    Patient denies chest pain, abd pain, N/V, fever, chills, dysuria or any other complaints. All remainder ROS negative.     MEDICATIONS  (STANDING):  albuterol/ipratropium for Nebulization 3 milliLiter(s) Nebulizer every 6 hours  aMIOdarone    Tablet 200 milliGRAM(s) Oral daily  amLODIPine   Tablet 5 milliGRAM(s) Oral daily  aspirin enteric coated 81 milliGRAM(s) Oral daily  atorvastatin 40 milliGRAM(s) Oral at bedtime  azithromycin  IVPB 500 milliGRAM(s) IV Intermittent every 24 hours  benzonatate 100 milliGRAM(s) Oral three times a day  carvedilol 6.25 milliGRAM(s) Oral every 12 hours  clopidogrel Tablet 75 milliGRAM(s) Oral daily  enoxaparin Injectable 40 milliGRAM(s) SubCutaneous every 24 hours  guaiFENesin ER 1200 milliGRAM(s) Oral every 12 hours  methylPREDNISolone sodium succinate Injectable 40 milliGRAM(s) IV Push every 8 hours  oseltamivir 75 milliGRAM(s) Oral two times a day  piperacillin/tazobactam IVPB.. 3.375 Gram(s) IV Intermittent every 8 hours  sacubitril 24 mG/valsartan 26 mG 1 Tablet(s) Oral two times a day    MEDICATIONS  (PRN):  acetaminophen     Tablet .. 650 milliGRAM(s) Oral every 6 hours PRN Temp greater or equal to 38C (100.4F), Mild Pain (1 - 3)  ALBUTerol    0.083% 2.5 milliGRAM(s) Nebulizer every 2 hours PRN Shortness of Breath and/or Wheezing  aluminum hydroxide/magnesium hydroxide/simethicone Suspension 30 milliLiter(s) Oral every 4 hours PRN Dyspepsia  melatonin 3 milliGRAM(s) Oral at bedtime PRN Insomnia  ondansetron Injectable 4 milliGRAM(s) IV Push every 8 hours PRN Nausea and/or Vomiting      I&O's Summary    30 Apr 2022 07:01  -  01 May 2022 07:00  --------------------------------------------------------  IN: 300 mL / OUT: 2125 mL / NET: -1825 mL        PHYSICAL EXAM:  Vital Signs Last 24 Hrs  T(C): 36.7 (01 May 2022 05:20), Max: 36.7 (30 Apr 2022 11:59)  T(F): 98 (01 May 2022 05:20), Max: 98 (30 Apr 2022 11:59)  HR: 61 (01 May 2022 10:09) (55 - 72)  BP: 103/65 (01 May 2022 05:20) (103/65 - 116/65)  BP(mean): --  RR: 18 (01 May 2022 09:06) (18 - 18)  SpO2: 94% (01 May 2022 10:40) (84% - 98%)    CONSTITUTIONAL: NAD, well-groomed. on HFNC and NRB  ENMT: Moist oral mucosa, no pharyngeal injection or exudate  RESPIRATORY: Diffuse bronchial breath sound with occasional ronchil noted   CARDIOVASCULAR: Regular rate and rhythm, normal S1 and S2, no murmur/rub/gallop; No lower extremity edema  ABDOMEN: Nontender to palpation, normoactive bowel sounds, no rebound/guarding; No hepatosplenomegaly  MUSCLOSKELETAL:  Normal gait; no clubbing or cyanosis of digits; no joint swelling or tenderness to palpation  PSYCH: A+O x3; affect appropriate  NEUROLOGY: CN 2-12 are intact and symmetric; no gross sensory deficits;   SKIN: No rashes; no palpable lesions    LABS:                        14.6   15.89 )-----------( 277      ( 01 May 2022 07:29 )             42.4     05-01    139  |  104  |  18.5  ----------------------------<  123<H>  4.1   |  25.0  |  0.65    Ca    8.6      01 May 2022 07:29  Phos  1.8     05-01  Mg     2.2     05-01                CAPILLARY BLOOD GLUCOSE          RADIOLOGY & ADDITIONAL TESTS:  Results Reviewed:   Imaging Personally Reviewed:  Electrocardiogram Personally Reviewed:
Sydnee Atwood M.D.    Patient is a 76y old  Male who presents with a chief complaint of AHRF 2/2 Viral PNA / Influenza A r/o ARDS (30 Apr 2022 11:11)      SUBJECTIVE / OVERNIGHT EVENTS: remains on HFNC 100% and 40L. Overall feels better today than yesterday. Still with some dry cough and SOB.     Patient denies chest pain, abd pain, N/V, fever, chills, dysuria or any other complaints. All remainder ROS negative.     MEDICATIONS  (STANDING):  albuterol/ipratropium for Nebulization 3 milliLiter(s) Nebulizer every 6 hours  aMIOdarone    Tablet 200 milliGRAM(s) Oral daily  amLODIPine   Tablet 5 milliGRAM(s) Oral daily  aspirin enteric coated 81 milliGRAM(s) Oral daily  atorvastatin 40 milliGRAM(s) Oral at bedtime  azithromycin  IVPB 500 milliGRAM(s) IV Intermittent every 24 hours  benzonatate 100 milliGRAM(s) Oral three times a day  carvedilol 6.25 milliGRAM(s) Oral every 12 hours  cefTRIAXone   IVPB 1000 milliGRAM(s) IV Intermittent every 24 hours  clopidogrel Tablet 75 milliGRAM(s) Oral daily  enoxaparin Injectable 40 milliGRAM(s) SubCutaneous every 24 hours  guaiFENesin ER 1200 milliGRAM(s) Oral every 12 hours  oseltamivir 75 milliGRAM(s) Oral two times a day  predniSONE   Tablet 40 milliGRAM(s) Oral daily  sacubitril 24 mG/valsartan 26 mG 1 Tablet(s) Oral two times a day    MEDICATIONS  (PRN):  acetaminophen     Tablet .. 650 milliGRAM(s) Oral every 6 hours PRN Temp greater or equal to 38C (100.4F), Mild Pain (1 - 3)  ALBUTerol    0.083% 2.5 milliGRAM(s) Nebulizer every 2 hours PRN Shortness of Breath and/or Wheezing  aluminum hydroxide/magnesium hydroxide/simethicone Suspension 30 milliLiter(s) Oral every 4 hours PRN Dyspepsia  melatonin 3 milliGRAM(s) Oral at bedtime PRN Insomnia  ondansetron Injectable 4 milliGRAM(s) IV Push every 8 hours PRN Nausea and/or Vomiting      I&O's Summary    29 Apr 2022 07:01  -  30 Apr 2022 07:00  --------------------------------------------------------  IN: 1250 mL / OUT: 500 mL / NET: 750 mL        PHYSICAL EXAM:  Vital Signs Last 24 Hrs  T(C): 36.7 (30 Apr 2022 11:59), Max: 36.9 (29 Apr 2022 21:40)  T(F): 98 (30 Apr 2022 11:59), Max: 98.4 (29 Apr 2022 21:40)  HR: 55 (30 Apr 2022 11:59) (55 - 82)  BP: 104/50 (30 Apr 2022 11:59) (104/50 - 108/62)  BP(mean): --  RR: 18 (30 Apr 2022 11:59) (18 - 19)  SpO2: 98% (30 Apr 2022 11:59) (94% - 98%)    CONSTITUTIONAL: NAD, well-groomed. on HFNC  ENMT: Moist oral mucosa, no pharyngeal injection or exudate  RESPIRATORY: Diffuse bronchial breath sound with occasional ronchil noted   CARDIOVASCULAR: Regular rate and rhythm, normal S1 and S2, no murmur/rub/gallop; No lower extremity edema  ABDOMEN: Nontender to palpation, normoactive bowel sounds, no rebound/guarding; No hepatosplenomegaly  MUSCLOSKELETAL:  Normal gait; no clubbing or cyanosis of digits; no joint swelling or tenderness to palpation  PSYCH: A+O x3; affect appropriate  NEUROLOGY: CN 2-12 are intact and symmetric; no gross sensory deficits;   SKIN: No rashes; no palpable lesions    LABS:                        14.4   22.87 )-----------( 221      ( 29 Apr 2022 06:26 )             41.8     04-29    141  |  105  |  23.7<H>  ----------------------------<  139<H>  3.3<L>   |  24.0  |  0.70    Ca    8.9      29 Apr 2022 06:26        Culture - Blood (collected 27 Apr 2022 19:53)  Source: .Blood Blood  Preliminary Report (29 Apr 2022 21:00):    No growth at 48 hours    Culture - Blood (collected 27 Apr 2022 19:53)  Source: .Blood Blood  Preliminary Report (29 Apr 2022 21:00):    No growth at 48 hours    Culture - Blood (collected 27 Apr 2022 13:18)  Source: .Blood Blood  Preliminary Report (29 Apr 2022 15:01):    No growth at 48 hours    Culture - Blood (collected 27 Apr 2022 13:17)  Source: .Blood Blood  Preliminary Report (29 Apr 2022 15:01):    No growth at 48 hours      CAPILLARY BLOOD GLUCOSE          RADIOLOGY & ADDITIONAL TESTS:  Results Reviewed:   Imaging Personally Reviewed:  Electrocardiogram Personally Reviewed:
PULMONARY PROGRESS NOTE      GALI KELLEYGERRY-[id number]    Patient is a 76y old  Male who presents with a chief complaint of AHRF 2/2 Viral PNA / Influenza A r/o ARDS (05 May 2022 12:37)  76M with PMHX MI/CAD, HTN, HLD, CHFrEF, ICM s/p AICD presents to [facility] ER c/o SOB/CARL and Nonproductive Cough x1-2 days s/p failed outpt abx for PNA admitted for Acute Hypoxic Respiratory Failure 2/2 Viral PNA/Influenza and possible superimposed bacterial PNA  Acute Hypoxic Respiratory Failure 2/2 multifocal viral PNA from Influenza A  MI/CAD  HTN/HLD  Chronic CHFrEF  ICM s/p AICD      INTERVAL HPI/OVERNIGHT EVENTS:  Slow improvement  CARL       MEDICATIONS  (STANDING):  albuterol/ipratropium for Nebulization 3 milliLiter(s) Nebulizer every 6 hours  aMIOdarone    Tablet 200 milliGRAM(s) Oral daily  amLODIPine   Tablet 5 milliGRAM(s) Oral daily  aspirin enteric coated 81 milliGRAM(s) Oral daily  atorvastatin 40 milliGRAM(s) Oral at bedtime  benzonatate 100 milliGRAM(s) Oral three times a day  carvedilol 6.25 milliGRAM(s) Oral every 12 hours  clopidogrel Tablet 75 milliGRAM(s) Oral daily  enoxaparin Injectable 40 milliGRAM(s) SubCutaneous every 24 hours  methylPREDNISolone sodium succinate Injectable 40 milliGRAM(s) IV Push every 8 hours  piperacillin/tazobactam IVPB.. 3.375 Gram(s) IV Intermittent every 8 hours  sacubitril 24 mG/valsartan 26 mG 1 Tablet(s) Oral two times a day      MEDICATIONS  (PRN):  acetaminophen     Tablet .. 650 milliGRAM(s) Oral every 6 hours PRN Temp greater or equal to 38C (100.4F), Mild Pain (1 - 3)  ALBUTerol    0.083% 2.5 milliGRAM(s) Nebulizer every 2 hours PRN Shortness of Breath and/or Wheezing  aluminum hydroxide/magnesium hydroxide/simethicone Suspension 30 milliLiter(s) Oral every 4 hours PRN Dyspepsia  guaifenesin/dextromethorphan Oral Liquid 10 milliLiter(s) Oral every 6 hours PRN Cough  melatonin 5 milliGRAM(s) Oral at bedtime PRN Insomnia  ondansetron Injectable 4 milliGRAM(s) IV Push every 8 hours PRN Nausea and/or Vomiting      Allergies    No Known Allergies    Intolerances        PAST MEDICAL & SURGICAL HISTORY:  HTN (hypertension)    High cholesterol    Ejection fraction &lt; 50%    AICD (automatic cardioverter/defibrillator) present    History of total hip replacement, right    S/P cholecystectomy    Abdominal hernia        SOCIAL HISTORY  Smoking History:           Vital Signs Last 24 Hrs  T(C): 36.6 (06 May 2022 04:48), Max: 36.6 (05 May 2022 17:38)  T(F): 97.9 (06 May 2022 04:48), Max: 97.9 (06 May 2022 04:48)  HR: 67 (06 May 2022 08:38) (60 - 72)  BP: 112/67 (06 May 2022 04:48) (102/63 - 112/67)  BP(mean): --  RR: 19 (06 May 2022 04:48) (18 - 19)  SpO2: 93% (06 May 2022 08:38) (90% - 95%)    PHYSICAL EXAMINATION:    GENERAL: The patient is awake and alert in no apparent distress.     HEENT: Head is normocephalic and atraumatic.    NECK: Supple.    LUNGS: Basilar rales    HEART: Iregular rate and rhythm without murmur.    NEUROLOGIC: Grossly intact.    LABS:                        14.9   21.30 )-----------( 388      ( 05 May 2022 07:27 )             44.9     05-05    139  |  101  |  31.3<H>  ----------------------------<  161<H>  4.6   |  27.0  |  0.72    Ca    9.2      05 May 2022 07:27  Phos  2.7     05-05  Mg     2.5     05-05      RADIOLOGY & ADDITIONAL STUDIES:    CT Chest 5/4/22  COMPARISON: 4/27/2022.    FINDINGS:    LUNGS/AIRWAYS/PLEURA: Decreased bilateral diffuse groundglass. Unchanged   mild bronchial dilatation. Mild bilateral interlobular septal thickening   and intralobular lines. No endobronchial lesion. Nonobstructing   secretions within the distal bronchus. Unremarkable pleura.    LYMPH NODES/MEDIASTINUM: No enlarged lymph nodes.    HEART/VASCULATURE: Enlarged heart. No pericardial effusion. Appropriate   course of right ventricular ICD. Stable enlarged 4.6 ascending aorta.   Coronary artery calcifications.    UPPER ABDOMEN: Few too small to characterize hypodensities in the liver   and left kidney. Bilateral renal cysts. Cholecystectomy.    BONES/SOFT TISSUES: Degenerative changes of the spine. Bilateral   glenohumeral joint arthrosis.      IMPRESSION:    Decreased diffuse bilateral groundglass compatible with pneumonia/acute   lung injury.    KRYSTAL SIM M.D., ATTENDING RADIOLOGIST  This document has been electronically signed. May  4 2022  4:34PM  
PULMONARY PROGRESS NOTE      GALI KELLEYGERRY-[id number]    Patient is a 76y old  Male who presents with a chief complaint of AHRF 2/2 Viral PNA / Influenza A r/o ARDS (02 May 2022 09:44)  76M with PMHX MI/CAD, HTN, HLD, CHFrEF, ICM s/p AICD presents to [facility] ER c/o SOB/CARL and Nonproductive Cough x1-2 days s/p failed outpt abx for PNA admitted for Acute Hypoxic Respiratory Failure 2/2 Viral PNA/Influenza and possible superimposed bacterial PNA      INTERVAL HPI/OVERNIGHT EVENTS:  Feels slightly better  HiFlow 60%=>40%=>60%    MEDICATIONS  (STANDING):  albuterol/ipratropium for Nebulization 3 milliLiter(s) Nebulizer every 6 hours  aMIOdarone    Tablet 200 milliGRAM(s) Oral daily  amLODIPine   Tablet 5 milliGRAM(s) Oral daily  aspirin enteric coated 81 milliGRAM(s) Oral daily  atorvastatin 40 milliGRAM(s) Oral at bedtime  azithromycin  IVPB 500 milliGRAM(s) IV Intermittent every 24 hours  benzonatate 100 milliGRAM(s) Oral three times a day  carvedilol 6.25 milliGRAM(s) Oral every 12 hours  clopidogrel Tablet 75 milliGRAM(s) Oral daily  enoxaparin Injectable 40 milliGRAM(s) SubCutaneous every 24 hours  methylPREDNISolone sodium succinate Injectable 40 milliGRAM(s) IV Push every 8 hours  oseltamivir 75 milliGRAM(s) Oral two times a day  piperacillin/tazobactam IVPB.. 3.375 Gram(s) IV Intermittent every 8 hours  sacubitril 24 mG/valsartan 26 mG 1 Tablet(s) Oral two times a day      MEDICATIONS  (PRN):  acetaminophen     Tablet .. 650 milliGRAM(s) Oral every 6 hours PRN Temp greater or equal to 38C (100.4F), Mild Pain (1 - 3)  ALBUTerol    0.083% 2.5 milliGRAM(s) Nebulizer every 2 hours PRN Shortness of Breath and/or Wheezing  aluminum hydroxide/magnesium hydroxide/simethicone Suspension 30 milliLiter(s) Oral every 4 hours PRN Dyspepsia  guaifenesin/dextromethorphan Oral Liquid 10 milliLiter(s) Oral every 6 hours PRN Cough  melatonin 3 milliGRAM(s) Oral at bedtime PRN Insomnia  ondansetron Injectable 4 milliGRAM(s) IV Push every 8 hours PRN Nausea and/or Vomiting      Allergies    No Known Allergies    Intolerances        PAST MEDICAL & SURGICAL HISTORY:  HTN (hypertension)    High cholesterol    Ejection fraction &lt; 50%    AICD (automatic cardioverter/defibrillator) present    History of total hip replacement, right    S/P cholecystectomy    Abdominal hernia        SOCIAL HISTORY  Smoking History:       REVIEW OF SYSTEMS:    CONSTITUTIONAL:  No distress    HEENT:  Eyes:  No diplopia or blurred vision. ENT:  No earache, sore throat or runny nose.    CARDIOVASCULAR:  No pressure, squeezing, tightness, heaviness or aching about the chest; no palpitations.    RESPIRATORY:  No cough, shortness of breath, PND or orthopnea. Mild SOBOE    GASTROINTESTINAL:  No nausea, vomiting or diarrhea.    GENITOURINARY:  No dysuria, frequency or urgency.    NEUROLOGIC:  No paresthesias, fasciculations, seizures or weakness.    PSYCHIATRIC:  No disorder of thought or mood.    Vital Signs Last 24 Hrs  T(C): 36.4 (02 May 2022 05:20), Max: 36.7 (01 May 2022 11:08)  T(F): 97.5 (02 May 2022 05:20), Max: 98.1 (01 May 2022 11:08)  HR: 62 (02 May 2022 05:20) (60 - 68)  BP: 104/65 (02 May 2022 05:20) (104/65 - 123/72)  BP(mean): --  RR: 19 (02 May 2022 05:20) (18 - 20)  SpO2: 96% (02 May 2022 05:20) (93% - 97%)    PHYSICAL EXAMINATION:    GENERAL: The patient is awake and alert      HEENT: Head is normocephalic and atraumatic. Extraocular muscles are intact. Mucous membranes are moist.    NECK: Supple.    LUNGS: Scattered rhonchi to auscultation    HEART: Regular rate and rhythm without murmur.        LABS:                        14.1   16.23 )-----------( 306      ( 02 May 2022 06:14 )             42.3     05-02    140  |  104  |  21.6<H>  ----------------------------<  201<H>  3.8   |  24.0  |  0.53    Ca    8.8      02 May 2022 06:14  Phos  2.8     05-02  Mg     2.4     05-02          ABG - ( 01 May 2022 09:25 )  pH, Arterial: 7.480 pH, Blood: x     /  pCO2: 38    /  pO2: 81    / HCO3: 28    / Base Excess: 4.8   /  SaO2: 97.5        MICROBIOLOGY: Non-contrib    RADIOLOGY & ADDITIONAL STUDIES:    CTA on 4/27/22  IMPRESSION:    No pulmonaryembolus.    Patchy bilateral groundglass opacities which have the appearance of COVID   19 pneumonia. Correlate with PCR examination.    Remaining incidental findings as above.    IVETTE GARCIA MD; Attending Radiologist  This document has been electronically signed. Apr 27 2022  4:40PM          ACC: [id number] EXAM:  XR CHEST PORTABLE URGENT 1V                          PROCEDURE DATE:  04/29/2022      INTERPRETATION:  AP semierect chest on April 29, 2022 at 9:23 AM.    Elevated right hemidiaphragm and probable heart enlargement again noted.   Left sided defibrillator again noted.    There are persistent central infiltrates.    Chest is similar to April 27.    IMPRESSION: Unchanged bilateral infiltrates.    NIDIA MCCRAY MD; Attending Radiologist  This document has been electronically signed. Apr 29 2022  2:11PM      CXR on 5/1/22  ACC: [id number] EXAM:  XR CHEST PORTABLE URGENT 1V                          PROCEDURE DATE:  05/01/2022          INTERPRETATION:  AP semierect chest on May 1, 2022 at 9:57 AM. Patient   has increasing hypoxia.    Heart likely enlarged. Left-sided defibrillator again noted.    Significant bilateral interstitial infiltrates in the mid and upper lung   fields again noted.    Chest is similar to April 29.    IMPRESSION: Unchanged fairly advanced infiltrates.    TIEN SANTIAGO; Attending Radiologist  This document has been electronically signed. May  2 2022 12:09PM          
PULMONARY PROGRESS NOTE      KELLEYGALIGERRY-[id number]    Patient is a 76y old  Male who presents with a chief complaint of AHRF 2/2 Viral PNA / Influenza A r/o ARDS (09 May 2022 14:06)      INTERVAL HPI/OVERNIGHT EVENTS:  worsening hypoxemia overnight  now on hFNC 50/95 sp02 88-92%  no CP or sputum  wants to go home  MEDICATIONS  (STANDING):  albuterol/ipratropium for Nebulization 3 milliLiter(s) Nebulizer every 6 hours  aMIOdarone    Tablet 200 milliGRAM(s) Oral daily  amLODIPine   Tablet 5 milliGRAM(s) Oral daily  aspirin enteric coated 81 milliGRAM(s) Oral daily  atorvastatin 40 milliGRAM(s) Oral at bedtime  carvedilol 6.25 milliGRAM(s) Oral every 12 hours  clopidogrel Tablet 75 milliGRAM(s) Oral daily  enoxaparin Injectable 40 milliGRAM(s) SubCutaneous every 24 hours  furosemide    Tablet 20 milliGRAM(s) Oral daily  piperacillin/tazobactam IVPB. 3.375 Gram(s) IV Intermittent once  predniSONE   Tablet 20 milliGRAM(s) Oral daily  saccharomyces boulardii 250 milliGRAM(s) Oral two times a day  sacubitril 24 mG/valsartan 26 mG 1 Tablet(s) Oral two times a day  senna 2 Tablet(s) Oral at bedtime      MEDICATIONS  (PRN):  acetaminophen     Tablet .. 650 milliGRAM(s) Oral every 6 hours PRN Temp greater or equal to 38C (100.4F), Mild Pain (1 - 3)  ALBUTerol    0.083% 2.5 milliGRAM(s) Nebulizer every 2 hours PRN Shortness of Breath and/or Wheezing  aluminum hydroxide/magnesium hydroxide/simethicone Suspension 30 milliLiter(s) Oral every 4 hours PRN Dyspepsia  guaifenesin/dextromethorphan Oral Liquid 10 milliLiter(s) Oral every 6 hours PRN Cough  melatonin 5 milliGRAM(s) Oral at bedtime PRN Insomnia  ondansetron Injectable 4 milliGRAM(s) IV Push every 8 hours PRN Nausea and/or Vomiting      Allergies    No Known Allergies    Intolerances        PAST MEDICAL & SURGICAL HISTORY:  HTN (hypertension)    High cholesterol    Ejection fraction &lt; 50%    AICD (automatic cardioverter/defibrillator) present    History of total hip replacement, right    S/P cholecystectomy    Abdominal hernia        SOCIAL HISTORY  Smoking History:       REVIEW OF SYSTEMS:    CONSTITUTIONAL:  No distress    HEENT:  Eyes:  No diplopia or blurred vision. ENT:  No earache, sore throat or runny nose.    CARDIOVASCULAR:  No pressure, squeezing, tightness, heaviness or aching about the chest; no palpitations.    RESPIRATORY:  see HPI    GASTROINTESTINAL:  No nausea, vomiting or diarrhea.    GENITOURINARY:  No dysuria, frequency or urgency.    NEUROLOGIC:  No paresthesias, fasciculations, seizures or weakness.    PSYCHIATRIC:  No disorder of thought or mood.    Vital Signs Last 24 Hrs  T(C): 36.4 (10 May 2022 11:20), Max: 36.7 (10 May 2022 04:22)  T(F): 97.5 (10 May 2022 11:20), Max: 98 (10 May 2022 04:22)  HR: 68 (10 May 2022 11:20) (63 - 83)  BP: 104/66 (10 May 2022 11:20) (99/63 - 117/67)  BP(mean): --  RR: 20 (10 May 2022 11:20) (20 - 26)  SpO2: 92% (10 May 2022 11:20) (85% - 95%)    PHYSICAL EXAMINATION:    GENERAL: The patient is awake and alert in no apparent distress.     HEENT: Head is normocephalic and atraumatic. Extraocular muscles are intact. Mucous membranes are moist.    NECK: Supple.    LUNGS: moderate air entry bilat without wheezing, rales or rhonchi; respirations unlabored    HEART: Regular rate and rhythm without murmur.    ABDOMEN: Soft, nontender, and nondistended.      EXTREMITIES: Without any cyanosis, clubbing, rash, lesions or edema.    NEUROLOGIC: Grossly intact.    LABS:                        15.9   23.28 )-----------( 299      ( 10 May 2022 10:05 )             47.1     05-10    133<L>  |  101  |  35.4<H>  ----------------------------<  184<H>  5.3   |  21.0<L>  |  0.83    Ca    9.1      10 May 2022 10:05          ABG - ( 10 May 2022 04:15 )  pH, Arterial: 7.490 pH, Blood: x     /  pCO2: 35    /  pO2: 82    / HCO3: 27    / Base Excess: 3.4   /  SaO2: 98.3                        Procalcitonin, Serum: 0.10 ng/mL (05-10-22 @ 10:05)      MICROBIOLOGY:    RADIOLOGY & ADDITIONAL STUDIES:  CT scans reviewed

## 2022-05-18 NOTE — PROGRESS NOTE ADULT - PROVIDER SPECIALTY LIST ADULT
Hospitalist
Pulmonology
Hospitalist
Infectious Disease
Infectious Disease
Hospitalist
Pulmonology
Pulmonology
Hospitalist
Pulmonology
Hospitalist
Pulmonology
Pulmonology
Hospitalist
Hospitalist
Pulmonology

## 2022-05-18 NOTE — PROGRESS NOTE ADULT - ASSESSMENT
76M with PMHX MI/CAD, HTN, HLD, CHFrEF, ICM s/p AICD presents to [facility] ER c/o SOB/CARL and Nonproductive Cough x1-2 days s/p failed outpt abx for PNA admitted for Acute Hypoxic Respiratory Failure 2/2 Viral PNA/Influenza and possible superimposed bacterial PNA.    #Acute Hypoxic Respiratory Failure 2/2 multifocal viral PNA from Influenza A on underlying HFrEF now complicated by mucouos plugging possible secondary bacterial infection   - wean off oxygen as tolerated, suspect he will need prolonged O2 therapy at home.   - PE was ruled out on CTA, duplex - neg   - CT with mucous plugging with rt middle lobe collapse  - s/p 5 days of zosyn, will monitor off abx  - change to prednisone QD with long taper  - Bactrim PPX while on steroid  - prone as tolerated, supportive care with IS, mucinex, tylenol, duonebs, Chest PT  - Oxygenation improving    #Dysphagia   #Oral thrush  - nystatin, fluconazole x 7 days  - ppi + maalox     #Leukocytosis  - infectious with recent steroid use  - trend CBC  - management as above    #MI/CAD  #HTN/HLD  #Chronic CHFrEF  #ICM s/p AICD  - ASA + Plavix   - Amiodarone,  Coreg , Entresto  - Amlodipine,  Atorvastatin, - Lasix 20mg    DVT ppx - lovenox  Diet - DASH/TLC  Dispo - home today    DNR/DNI    Updated daughter Marge at bedside 5/18

## 2022-05-18 NOTE — PROGRESS NOTE ADULT - NUTRITIONAL ASSESSMENT
This patient has been assessed with a concern for Malnutrition and has been determined to have a diagnosis/diagnoses of Severe protein-calorie malnutrition.    This patient is being managed with:   Diet DASH/TLC-  Sodium & Cholesterol Restricted  Supplement Feeding Modality:  Oral  Ensure Enlive Cans or Servings Per Day:  1       Frequency:  Three Times a day  Entered: May  1 2022 11:27AM    

## 2022-05-18 NOTE — DISCHARGE NOTE NURSING/CASE MANAGEMENT/SOCIAL WORK - NSDCFUADDAPPT_GEN_ALL_CORE_FT
Please follow up with your primary care physician within 1 week to repeat basic blood works (CBC and BMP).    Follow up with GI for eventual endoscopy outpatient

## 2022-05-18 NOTE — PROGRESS NOTE ADULT - REASON FOR ADMISSION
AHRF 2/2 Viral PNA / Influenza A r/o ARDS

## 2022-05-18 NOTE — DISCHARGE NOTE NURSING/CASE MANAGEMENT/SOCIAL WORK - PATIENT PORTAL LINK FT
You can access the FollowMyHealth Patient Portal offered by [facility] by registering at the following website: [email or web address]. By joining [employer]’s FollowMyHealth portal, you will also be able to view your health information using other applications (apps) compatible with our system.

## 2022-05-19 RX ORDER — NYSTATIN 500MM UNIT
5 POWDER (EA) MISCELLANEOUS
Qty: 200 | Refills: 0
Start: 2022-05-19 | End: 2022-05-28

## 2022-05-19 RX ORDER — FLUCONAZOLE 150 MG/1
1 TABLET ORAL
Qty: 1 | Refills: 0
Start: 2022-05-19 | End: 2022-05-19

## 2022-05-23 ENCOUNTER — NON-APPOINTMENT (OUTPATIENT)
Age: 77
End: 2022-05-23

## 2022-05-25 ENCOUNTER — NON-APPOINTMENT (OUTPATIENT)
Age: 77
End: 2022-05-25

## 2022-05-31 ENCOUNTER — APPOINTMENT (OUTPATIENT)
Dept: FAMILY MEDICINE | Facility: CLINIC | Age: 77
End: 2022-05-31

## 2022-06-16 ENCOUNTER — APPOINTMENT (OUTPATIENT)
Dept: PULMONOLOGY | Facility: CLINIC | Age: 77
End: 2022-06-16

## 2022-06-30 NOTE — CONSULT NOTE ADULT - ASSESSMENT
DATE OF PROCEDURE:   6/30/2022     PREOPERATIVE DIAGNOSIS:  Left Achilles tendon rupture. POSTOPERATIVE DIAGNOSIS:  Left Achilles tendon rupture. PROCEDURE:  Open repair of left Achilles tendon using FlowerDerm for 4 cm x 8 cm graft. ATTENDING SURGEON:  Shelley Cedillo DO     ANESTHESIA:  General.     ESTIMATED BLOOD LOSS:  2 mL. TOTAL TOURNIQUET TIME:   40 minutes. FLUIDS:  800 mL of crystalloids. INDICATION FOR PROCEDURE:  The patient is a 73-year-old male who  sustained an injury to the left ankle last week. He was initially seen  in the Emergency Department, and subsequently followed up in my office. Evaluation of him clinically showed a defect at the Achilles tendon  midsubstance and he did have a positive Valencia test indicative of an  Achilles tendon rupture. At this point, I recommended surgical  treatment. I explained the risks, benefits, and possible complications  of the procedure to the patient, and after answering all of his  questions, he consented to undergo the above procedure. DESCRIPTION OF PROCEDURE:  The patient was seen and evaluated in the  preoperative holding area where the left lower extremity was signed in  his presence. At this point, care of the patient was turned over to  Anesthesia Team who transported him back to the operative suite. General anesthesia was applied, and once adequate anesthesia was  obtained, he was placed in the prone position. The left lower  extremity was then prepped and draped in the usual sterile fashion. Preoperative antibiotics were administered. At this point, a timeout  was performed and all in attendance were in agreement. I exsanguinated the left lower extremity with the use of Esmarch and  tourniquet was inflated at 250 mmHg. I then palpated the defect of the  Achilles tendon at the midsubstance. I then marked out the longitudinal  incision just along the medial border of the Achilles tendon.   I then  made an 76M with PMHX MI/CAD, HTN, HLD, CHFrEF, ICM s/p AICD presents to [facility] ER c/o SOB/CARL and Nonproductive Cough x1-2 days s/p failed outpt abx for PNA admitted for Acute Hypoxic Respiratory Failure 2/2 multifocal viral PNA from Influenza A  on underlying HFrEF now complicated by mucous plugging possible secondary bacterial infection. GI consulted for dysphagia. Pt reports he feels better this AM. Denies nausea, vomiting, abdominal pain. Now Tolerating diet w/o much difficulty. Currently on Fluconazole treatment for oral thrush. Denies any prior EGD in the past.     Plan:  Continue Fluconazole, Nystatin for oral thrush   PPI BID, Maalox   Will need Eventual outpatient EGD once medically optimized.  F/U outpatient with Gastroenterologist Gely Avila for further outpatient workup.      incision in this location and carried sharp dissection down  through the subcutaneous tissue identifying the peritenon. I then  incised longitudinally through the peritenon both proximally and  distally and I then elevated this off of the Achilles tendon. I did  identify a midsubstance tear with a complete rupture of the Achilles  tendon. I then grasped hold of the distal aspect of the tendon with an  Allis clamp and elevated the peritenon off of the tendon. I then  debrided the frayed edges of the tendon. I then performed the identical  procedure on the proximal stump of the tendon. The wound was then irrigated with sterile normal saline using bulb  irrigation. I then used two #2 FiberWire sutures and ran this suture  beginning on the lateral aspect in a standard Krackow fashion up and  down the tendon so the two strands exited out the central portion of the  proximal stump of the tendon. I then performed identical procedure with  a second FiberWire suture running a Krackow stitch up and down the  distal aspect of the tendon. Once I had all four stands of suture, I  was able to pull both stumps of the tendon with excellent stability. I  then tied both of the lateral sutures together and both of the medial  suture together with the foot in a plantarflexed position. Excess  suture was then cut. With the repair complete, I was able to perform  the a Laurel test with intact motion to the left foot. I then placed a 4 cm x 8 cm FlowerDerm graft circumferentially around  the repair site. This was then secured to the Achilles tendon centered  over the repair site using 2-0 Vicryl suture in simple fashion. I then  re-approximated the subcutaneous tissue using 2-0 Vicryl suture followed  by skin closure with 3-0 nylon. Tourniquet was then deflated for a  total of 40 minutes and adequate hemostasis was maintained.   I then  applied a sterile soft dressing to the left lower extremity followed by  an anterior Ortho-Glass splint with the foot held in a plantarflexed  position. The patient was then awakened anesthesia and transported to  PACU in stable condition. He appeared to have tolerated the procedure  well. PROGNOSIS:  At this point, he will be discharged to home with a short  course of oral antibiotics as well as pain medication. He is to remain  strictly nonweightbearing in the left leg and is to maintain his splint  clean, dry, and intact at all times. I will see him back in the office  in 2 weeks and will continue to monitor his progress in the outpatient  setting for resolution of his symptoms.            Paula 97, DO 76M with PMHX MI/CAD, HTN, HLD, CHFrEF, ICM s/p AICD presents to [facility] ER c/o SOB/CARL and Nonproductive Cough x1-2 days s/p failed outpt abx for PNA admitted for Acute Hypoxic Respiratory Failure 2/2 multifocal viral PNA from Influenza A  on underlying HFrEF now complicated by mucous plugging possible secondary bacterial infection. GI consulted for dysphagia. Pt reports he feels better this AM. Denies nausea, vomiting, abdominal pain. Now Tolerating diet w/o much difficulty. Currently on Fluconazole treatment for oral thrush. Denies any prior EGD in the past.     Plan:  Continue Fluconazole, Nystatin for oral thrush   PPI BID, Maalox   Continue Abx treatment as per ID recommendations.   Will need Eventual outpatient EGD once medically optimized.  F/U outpatient with Gastroenterologist Gely Avila for further outpatient workup.

## 2022-08-18 ENCOUNTER — NON-APPOINTMENT (OUTPATIENT)
Age: 77
End: 2022-08-18

## 2022-09-15 NOTE — ED ADULT TRIAGE NOTE - BP NONINVASIVE SYSTOLIC (MM HG)
- increase clonazepam to 1 mg in the morning   - refer to social work for med assistance and affordable therapy   - refer to MTM (pharmacy) for medication history review     **For crisis resources, please see the information at the end of this document**   Patient Education    Thank you for coming to the [facility] [place] [facility].     Lab Testing:  If you had lab testing today and your results are reassuring or normal they will be mailed to you or sent through [facility] within 7 days. If the lab tests need quick action we will call you with the results. The phone number we will call with results is # [phone]. If this is not the best number please call our clinic and change the number.     Medication Refills:  If you need any refills please call your pharmacy and they will contact us. Our fax number for refills is [phone].   Three business days of notice are needed for general medication refill requests.   Five business days of notice are needed for controlled substance refill requests.   If you need to change to a different pharmacy, please contact the new pharmacy directly. The new pharmacy will help you get your medications transferred.     Contact Us:  Please call [phone] during business hours (8-5:00 M-F).   If you have medication related questions after clinic hours, or on the weekend, please call [phone].     Financial Assistance [phone]   Medical Records [phone]       MENTAL HEALTH CRISIS RESOURCES:  For a emergency help, please call 911 or go to the nearest Emergency Department.     Emergency Walk-In Options:   EmPATH Unit @ [facility] ([place]): [phone] - Specialized mental health emergency area designed to be calming  [facility] ([place]): [phone]  [facility] ([place]): [phone]  [facility]): [phone]    [facility] Crisis Information:   [place]:  [phone]  [place]: [phone]  [place] (COPE) - Adult: [phone]     Child: [phone]  [place] - Adult: [phone]     Child: [phone]  [place]: [phone]  List of all [facility] resources:   https://[place].gov/dhs/people-we-serve/adults/health-care/mental-health/resources/crisis-contacts.jsp    National Crisis Information:   Crisis Text Line: Text  [place]  to 765756  Suicide & Crisis Lifeline: 988  National Suicide Prevention Lifeline: 9-621-794-TALK (1-300.498.9860)       For online chat options, visit https://suicidepreventionlifeline.org/chat/  Poison Control Center: 0-432-098-8976  Trans Lifeline: 1-794.518.7520 - Hotline for transgender people of all ages  [employer]: 6-705-417-0964 - Hotline for LGBT youth     For Non-Emergency Support:   Fast Tracker: Mental Health & Substance Use Disorder Resources -   https://www.PharmacopeiackNextDocsn.org/         105

## 2023-06-29 ENCOUNTER — EMERGENCY (EMERGENCY)
Facility: HOSPITAL | Age: 78
LOS: 1 days | Discharge: HOME | End: 2023-06-29
Attending: STUDENT IN AN ORGANIZED HEALTH CARE EDUCATION/TRAINING PROGRAM
Payer: MEDICARE

## 2023-06-29 VITALS
SYSTOLIC BLOOD PRESSURE: 175 MMHG | HEIGHT: 71 IN | HEART RATE: 62 BPM | RESPIRATION RATE: 16 BRPM | WEIGHT: 169.98 LBS | DIASTOLIC BLOOD PRESSURE: 99 MMHG | TEMPERATURE: 98 F | OXYGEN SATURATION: 97 %

## 2023-06-29 DIAGNOSIS — K46.9 UNSPECIFIED ABDOMINAL HERNIA WITHOUT OBSTRUCTION OR GANGRENE: Chronic | ICD-10-CM

## 2023-06-29 DIAGNOSIS — Z90.49 ACQUIRED ABSENCE OF OTHER SPECIFIED PARTS OF DIGESTIVE TRACT: Chronic | ICD-10-CM

## 2023-06-29 DIAGNOSIS — Z96.641 PRESENCE OF RIGHT ARTIFICIAL HIP JOINT: Chronic | ICD-10-CM

## 2023-06-29 DIAGNOSIS — Z95.810 PRESENCE OF AUTOMATIC (IMPLANTABLE) CARDIAC DEFIBRILLATOR: Chronic | ICD-10-CM

## 2023-06-29 LAB
ALBUMIN SERPL ELPH-MCNC: 3.9 G/DL — SIGNIFICANT CHANGE UP (ref 3.3–5.2)
ALP SERPL-CCNC: 119 U/L — SIGNIFICANT CHANGE UP (ref 40–120)
ALT FLD-CCNC: 25 U/L — SIGNIFICANT CHANGE UP
ANION GAP SERPL CALC-SCNC: 21 MMOL/L — HIGH (ref 5–17)
APTT BLD: 25.8 SEC — LOW (ref 27.5–35.5)
AST SERPL-CCNC: 30 U/L — SIGNIFICANT CHANGE UP
BASOPHILS # BLD AUTO: 0.1 K/UL — SIGNIFICANT CHANGE UP (ref 0–0.2)
BASOPHILS NFR BLD AUTO: 0.8 % — SIGNIFICANT CHANGE UP (ref 0–2)
BILIRUB SERPL-MCNC: 0.6 MG/DL — SIGNIFICANT CHANGE UP (ref 0.4–2)
BUN SERPL-MCNC: 19.7 MG/DL — SIGNIFICANT CHANGE UP (ref 8–20)
CALCIUM SERPL-MCNC: 9.9 MG/DL — SIGNIFICANT CHANGE UP (ref 8.4–10.5)
CHLORIDE SERPL-SCNC: 101 MMOL/L — SIGNIFICANT CHANGE UP (ref 96–108)
CO2 SERPL-SCNC: 17 MMOL/L — LOW (ref 22–29)
CREAT SERPL-MCNC: 0.99 MG/DL — SIGNIFICANT CHANGE UP (ref 0.5–1.3)
EGFR: 77 ML/MIN/1.73M2 — SIGNIFICANT CHANGE UP
EOSINOPHIL # BLD AUTO: 0.15 K/UL — SIGNIFICANT CHANGE UP (ref 0–0.5)
EOSINOPHIL NFR BLD AUTO: 1.1 % — SIGNIFICANT CHANGE UP (ref 0–6)
GLUCOSE SERPL-MCNC: 125 MG/DL — HIGH (ref 70–99)
HCT VFR BLD CALC: 48.7 % — SIGNIFICANT CHANGE UP (ref 39–50)
HGB BLD-MCNC: 16.9 G/DL — SIGNIFICANT CHANGE UP (ref 13–17)
IMM GRANULOCYTES NFR BLD AUTO: 1.9 % — HIGH (ref 0–0.9)
INR BLD: 1.11 RATIO — SIGNIFICANT CHANGE UP (ref 0.88–1.16)
LACTATE BLDV-MCNC: 5.1 MMOL/L — CRITICAL HIGH (ref 0.5–2)
LIDOCAIN IGE QN: 24 U/L — SIGNIFICANT CHANGE UP (ref 22–51)
LYMPHOCYTES # BLD AUTO: 15.6 % — SIGNIFICANT CHANGE UP (ref 13–44)
LYMPHOCYTES # BLD AUTO: 2.07 K/UL — SIGNIFICANT CHANGE UP (ref 1–3.3)
MCHC RBC-ENTMCNC: 33.9 PG — SIGNIFICANT CHANGE UP (ref 27–34)
MCHC RBC-ENTMCNC: 34.7 GM/DL — SIGNIFICANT CHANGE UP (ref 32–36)
MCV RBC AUTO: 97.8 FL — SIGNIFICANT CHANGE UP (ref 80–100)
MONOCYTES # BLD AUTO: 0.81 K/UL — SIGNIFICANT CHANGE UP (ref 0–0.9)
MONOCYTES NFR BLD AUTO: 6.1 % — SIGNIFICANT CHANGE UP (ref 2–14)
NEUTROPHILS # BLD AUTO: 9.85 K/UL — HIGH (ref 1.8–7.4)
NEUTROPHILS NFR BLD AUTO: 74.5 % — SIGNIFICANT CHANGE UP (ref 43–77)
PLATELET # BLD AUTO: 219 K/UL — SIGNIFICANT CHANGE UP (ref 150–400)
POTASSIUM SERPL-MCNC: 4 MMOL/L — SIGNIFICANT CHANGE UP (ref 3.5–5.3)
POTASSIUM SERPL-SCNC: 4 MMOL/L — SIGNIFICANT CHANGE UP (ref 3.5–5.3)
PROT SERPL-MCNC: 7 G/DL — SIGNIFICANT CHANGE UP (ref 6.6–8.7)
PROTHROM AB SERPL-ACNC: 12.9 SEC — SIGNIFICANT CHANGE UP (ref 10.5–13.4)
RBC # BLD: 4.98 M/UL — SIGNIFICANT CHANGE UP (ref 4.2–5.8)
RBC # FLD: 13.9 % — SIGNIFICANT CHANGE UP (ref 10.3–14.5)
SODIUM SERPL-SCNC: 139 MMOL/L — SIGNIFICANT CHANGE UP (ref 135–145)
TROPONIN T SERPL-MCNC: <0.01 NG/ML — SIGNIFICANT CHANGE UP (ref 0–0.06)
WBC # BLD: 13.23 K/UL — HIGH (ref 3.8–10.5)
WBC # FLD AUTO: 13.23 K/UL — HIGH (ref 3.8–10.5)

## 2023-06-29 PROCEDURE — 71045 X-RAY EXAM CHEST 1 VIEW: CPT | Mod: 26

## 2023-06-29 PROCEDURE — 93010 ELECTROCARDIOGRAM REPORT: CPT

## 2023-06-29 PROCEDURE — 99285 EMERGENCY DEPT VISIT HI MDM: CPT

## 2023-06-29 RX ORDER — MORPHINE SULFATE 50 MG/1
4 CAPSULE, EXTENDED RELEASE ORAL ONCE
Refills: 0 | Status: DISCONTINUED | OUTPATIENT
Start: 2023-06-29 | End: 2023-06-29

## 2023-06-29 RX ORDER — SODIUM CHLORIDE 9 MG/ML
1000 INJECTION, SOLUTION INTRAVENOUS ONCE
Refills: 0 | Status: COMPLETED | OUTPATIENT
Start: 2023-06-29 | End: 2023-06-29

## 2023-06-29 RX ORDER — ONDANSETRON 8 MG/1
4 TABLET, FILM COATED ORAL ONCE
Refills: 0 | Status: COMPLETED | OUTPATIENT
Start: 2023-06-29 | End: 2023-06-29

## 2023-06-29 RX ADMIN — MORPHINE SULFATE 4 MILLIGRAM(S): 50 CAPSULE, EXTENDED RELEASE ORAL at 23:23

## 2023-06-29 RX ADMIN — SODIUM CHLORIDE 2000 MILLILITER(S): 9 INJECTION, SOLUTION INTRAVENOUS at 21:59

## 2023-06-29 RX ADMIN — MORPHINE SULFATE 4 MILLIGRAM(S): 50 CAPSULE, EXTENDED RELEASE ORAL at 21:59

## 2023-06-29 RX ADMIN — ONDANSETRON 4 MILLIGRAM(S): 8 TABLET, FILM COATED ORAL at 21:59

## 2023-06-29 RX ADMIN — ONDANSETRON 4 MILLIGRAM(S): 8 TABLET, FILM COATED ORAL at 23:23

## 2023-06-29 NOTE — ED PROVIDER NOTE - CLINICAL SUMMARY MEDICAL DECISION MAKING FREE TEXT BOX
Will work up for syncope including cbc, cmp, trop T, ecg, CXR. CT abd/pelvis for abd pain eval. Given zofran and IVF. reassess.

## 2023-06-29 NOTE — ED PROVIDER NOTE - PROGRESS NOTE DETAILS
Elizabeth Hidalgo, DO PGY1: Patient received at signed out. Resting comfortably on stretcher. Reports feeling significantly improved. Abdomen soft, nontender. patsy: Pt signed out to me by dr. smith pending reassessment and repeat lactic acid.  repeat lactic acid cleared.  Pt feeling much better. pt tolerating po. will d/c to home with instructions to return for recurrent pain, recurrent vomiting, or any other concerns.  abdomen benign on exam. Elizabeth Hidalgo,  PGY1: Patient received at signed out. Resting comfortably on stretcher. Reports feeling significantly improved. Abdomen soft, nontender. Repeat lactate 1.4. Stable for dc home.

## 2023-06-29 NOTE — ED PROVIDER NOTE - OBJECTIVE STATEMENT
79 y/o male with PMHx of MI/CAD, HTN, HLD, CHFrEF presents to the ED c/o abdominal pain, nausea, near syncope. Pt states he was out to dinner, had 3 glasses of wine and symptoms started shortly after. no loc, no trauma, no chest pain, no headache, no vomiting, no diarrhea.

## 2023-06-29 NOTE — ED PROVIDER NOTE - PATIENT PORTAL LINK FT
You can access the FollowMyHealth Patient Portal offered by [facility] by registering at the following website: [email or web address]. By joining [email or web address]’s FollowMyHealth portal, you will also be able to view your health information using other applications (apps) compatible with our system. You can access the FollowMyHealth Patient Portal offered by [facility] by registering at the following website: [email or web address]. By joining [employer]’s FollowMyHealth portal, you will also be able to view your health information using other applications (apps) compatible with our system.

## 2023-06-29 NOTE — ED ADULT NURSE NOTE - NSFALLRISKINTERV_ED_ALL_ED

## 2023-06-29 NOTE — ED ADULT TRIAGE NOTE - CHIEF COMPLAINT QUOTE
Pt BIBA from home s/p near syncopal episode, diffuse abdominal pain, shortness of breath and nausea after dinner. Pt denies chest pain. Pt admits to drinking 3 glasses of wine at dinner.

## 2023-06-29 NOTE — ED ADULT NURSE NOTE - OBJECTIVE STATEMENT
pt to ED with complaints of abdominal pain. pmh MI, HTN, CHF, HLD. pt states he went out to dinner tonight and ate shrimp at approx 1900. pt states he then developed sudden onset abdominal pain with nausea and diarrhea. pt denies any vomiting. as per grand daughter at bedside pt developed chills and had near syncopal event. pt A+O x 3. RR even and unlabored. NSR on CM. pt states abdominal pain is 10/10 sharp pains. pt denies any CP/SOB.

## 2023-06-30 VITALS
TEMPERATURE: 98 F | DIASTOLIC BLOOD PRESSURE: 65 MMHG | RESPIRATION RATE: 20 BRPM | SYSTOLIC BLOOD PRESSURE: 126 MMHG | HEART RATE: 63 BPM | OXYGEN SATURATION: 93 %

## 2023-06-30 LAB — LACTATE BLDV-MCNC: 1.4 MMOL/L — SIGNIFICANT CHANGE UP (ref 0.5–2)

## 2023-06-30 PROCEDURE — 36415 COLL VENOUS BLD VENIPUNCTURE: CPT

## 2023-06-30 PROCEDURE — 84484 ASSAY OF TROPONIN QUANT: CPT

## 2023-06-30 PROCEDURE — 74177 CT ABD & PELVIS W/CONTRAST: CPT | Mod: 26,MG

## 2023-06-30 PROCEDURE — G1004: CPT

## 2023-06-30 PROCEDURE — 83605 ASSAY OF LACTIC ACID: CPT

## 2023-06-30 PROCEDURE — 80053 COMPREHEN METABOLIC PANEL: CPT

## 2023-06-30 PROCEDURE — 96374 THER/PROPH/DIAG INJ IV PUSH: CPT

## 2023-06-30 PROCEDURE — 74177 CT ABD & PELVIS W/CONTRAST: CPT | Mod: MG

## 2023-06-30 PROCEDURE — 93005 ELECTROCARDIOGRAM TRACING: CPT

## 2023-06-30 PROCEDURE — 85025 COMPLETE CBC W/AUTO DIFF WBC: CPT

## 2023-06-30 PROCEDURE — 96375 TX/PRO/DX INJ NEW DRUG ADDON: CPT

## 2023-06-30 PROCEDURE — 83690 ASSAY OF LIPASE: CPT

## 2023-06-30 PROCEDURE — 85610 PROTHROMBIN TIME: CPT

## 2023-06-30 PROCEDURE — 85730 THROMBOPLASTIN TIME PARTIAL: CPT

## 2023-06-30 PROCEDURE — 71045 X-RAY EXAM CHEST 1 VIEW: CPT

## 2023-06-30 PROCEDURE — 96376 TX/PRO/DX INJ SAME DRUG ADON: CPT

## 2023-06-30 PROCEDURE — 99285 EMERGENCY DEPT VISIT HI MDM: CPT | Mod: 25

## 2023-06-30 RX ORDER — KETOROLAC TROMETHAMINE 30 MG/ML
15 SYRINGE (ML) INJECTION ONCE
Refills: 0 | Status: DISCONTINUED | OUTPATIENT
Start: 2023-06-30 | End: 2023-06-30

## 2023-06-30 RX ORDER — METOCLOPRAMIDE HCL 10 MG
10 TABLET ORAL ONCE
Refills: 0 | Status: COMPLETED | OUTPATIENT
Start: 2023-06-30 | End: 2023-06-30

## 2023-06-30 RX ORDER — ONDANSETRON 8 MG/1
4 TABLET, FILM COATED ORAL ONCE
Refills: 0 | Status: COMPLETED | OUTPATIENT
Start: 2023-06-30 | End: 2023-06-30

## 2023-06-30 RX ORDER — ONDANSETRON 8 MG/1
1 TABLET, FILM COATED ORAL
Qty: 15 | Refills: 0
Start: 2023-06-30 | End: 2023-07-04

## 2023-06-30 RX ORDER — OXYCODONE AND ACETAMINOPHEN 5; 325 MG/1; MG/1
1 TABLET ORAL ONCE
Refills: 0 | Status: DISCONTINUED | OUTPATIENT
Start: 2023-06-30 | End: 2023-06-30

## 2023-06-30 RX ORDER — SODIUM CHLORIDE 9 MG/ML
1000 INJECTION INTRAMUSCULAR; INTRAVENOUS; SUBCUTANEOUS ONCE
Refills: 0 | Status: COMPLETED | OUTPATIENT
Start: 2023-06-30 | End: 2023-06-30

## 2023-06-30 RX ADMIN — Medication 15 MILLIGRAM(S): at 04:34

## 2023-06-30 RX ADMIN — Medication 10 MILLIGRAM(S): at 04:34

## 2023-06-30 RX ADMIN — SODIUM CHLORIDE 2000 MILLILITER(S): 9 INJECTION, SOLUTION INTRAVENOUS at 00:53

## 2023-06-30 RX ADMIN — MORPHINE SULFATE 4 MILLIGRAM(S): 50 CAPSULE, EXTENDED RELEASE ORAL at 00:23

## 2023-06-30 RX ADMIN — SODIUM CHLORIDE 1000 MILLILITER(S): 9 INJECTION INTRAMUSCULAR; INTRAVENOUS; SUBCUTANEOUS at 04:35

## 2023-06-30 RX ADMIN — ONDANSETRON 4 MILLIGRAM(S): 8 TABLET, FILM COATED ORAL at 03:36

## 2023-06-30 NOTE — ED ADULT NURSE REASSESSMENT NOTE - NS ED NURSE REASSESS COMMENT FT1
Patient received at 0730; awake; alert and oriented x4. Denies SOB, dizziness, chest pain. No distress noted. VSS. Respirations unlabored. Report received at bedside.  Call bell and personal items in reach. Continue to monitor patient and maintain safety. IV patent and flushing without difficulty, no signs of infiltration.
Pt resting comfortably in stretcher, pt AAOX3, resp even and unlabored on RA, new IV placed/patent/NS running @ 1000mL/hr, pt aware of plan of care, offers no complaints at this time, safety maintained

## 2023-06-30 NOTE — ED ADULT NURSE REASSESSMENT NOTE - NSFALLUNIVINTERV_ED_ALL_ED
Bed/Stretcher in lowest position, wheels locked, appropriate side rails in place/Call bell, personal items and telephone in reach/Instruct patient to call for assistance before getting out of bed/chair/stretcher/Non-slip footwear applied when patient is off stretcher/[place] to call system/Physically safe environment - no spills, clutter or unnecessary equipment/Purposeful proactive rounding/Room/bathroom lighting operational, light cord in reach

## 2023-08-17 ENCOUNTER — TRANSCRIPTION ENCOUNTER (OUTPATIENT)
Age: 78
End: 2023-08-17

## 2025-03-11 NOTE — PATIENT PROFILE ADULT - INTERNATIONAL TRAVEL
Quality 431: Preventive Care And Screening: Unhealthy Alcohol Use - Screening: Patient not identified as an unhealthy alcohol user when screened for unhealthy alcohol use using a systematic screening method Detail Level: Generalized Quality 226: Preventive Care And Screening: Tobacco Use: Screening And Cessation Intervention: Patient screened for tobacco use and is an ex/non-smoker Quality 130: Documentation Of Current Medications In The Medical Record: Current Medications Documented No